# Patient Record
Sex: FEMALE | Race: WHITE | NOT HISPANIC OR LATINO | Employment: UNEMPLOYED | URBAN - METROPOLITAN AREA
[De-identification: names, ages, dates, MRNs, and addresses within clinical notes are randomized per-mention and may not be internally consistent; named-entity substitution may affect disease eponyms.]

---

## 2017-03-12 ENCOUNTER — HOSPITAL ENCOUNTER (EMERGENCY)
Facility: MEDICAL CENTER | Age: 27
End: 2017-03-12
Attending: EMERGENCY MEDICINE
Payer: COMMERCIAL

## 2017-03-12 VITALS
HEART RATE: 77 BPM | OXYGEN SATURATION: 99 % | BODY MASS INDEX: 23.44 KG/M2 | TEMPERATURE: 99 F | RESPIRATION RATE: 18 BRPM | HEIGHT: 63 IN | DIASTOLIC BLOOD PRESSURE: 62 MMHG | WEIGHT: 132.28 LBS | SYSTOLIC BLOOD PRESSURE: 113 MMHG

## 2017-03-12 DIAGNOSIS — K02.9 COMPLEX DENTAL CAVITY: ICD-10-CM

## 2017-03-12 PROCEDURE — 99283 EMERGENCY DEPT VISIT LOW MDM: CPT

## 2017-03-12 RX ORDER — AMOXICILLIN 500 MG/1
500 CAPSULE ORAL 3 TIMES DAILY
Qty: 30 CAP | Refills: 0 | Status: ON HOLD | OUTPATIENT
Start: 2017-03-12 | End: 2020-09-06

## 2017-03-12 RX ORDER — TRAMADOL HYDROCHLORIDE 50 MG/1
50-100 TABLET ORAL EVERY 6 HOURS PRN
Qty: 20 TAB | Refills: 0 | Status: ON HOLD | OUTPATIENT
Start: 2017-03-12 | End: 2020-09-06

## 2017-03-12 ASSESSMENT — PAIN SCALES - GENERAL: PAINLEVEL_OUTOF10: 6

## 2017-03-12 NOTE — ED AVS SNAPSHOT
3/12/2017          Sol Jones  [address] [place] [address]    Dear Sol:    [facility] wants to ensure your discharge home is safe and you or your loved ones have had all your questions answered regarding your care after you leave the hospital.    You may receive a telephone call within two days of your discharge.  This call is to make certain you understand your discharge instructions as well as ensure we provided you with the best care possible during your stay with us.     The call will only last approximately 3-5 minutes and will be done by a nurse.    Once again, we want to ensure your discharge home is safe and that you have a clear understanding of any next steps in your care.  If you have any questions or concerns, please do not hesitate to contact us, we are here for you.  Thank you for choosing [facility] for your healthcare needs.    Sincerely,    Charli Serrano    [facility]

## 2017-03-12 NOTE — DISCHARGE INSTRUCTIONS
Dental Caries  Dental caries is tooth decay. This decay can cause a hole in teeth (cavity) that can get bigger and deeper over time.  HOME CARE  · Brush and floss your teeth. Do this at least two times a day.  · Use a fluoride toothpaste.  · Use a mouth rinse if told by your dentist or doctor.  · Eat less sugary and starchy foods. Drink less sugary drinks.  · Avoid snacking often on sugary and starchy foods. Avoid sipping often on sugary drinks.  · Keep regular checkups and cleanings with your dentist.  · Use fluoride supplements if told by your dentist or doctor.  · Allow fluoride to be applied to teeth if told by your dentist or doctor.     This information is not intended to replace advice given to you by your health care provider. Make sure you discuss any questions you have with your health care provider.     Document Released: 09/26/2009 Document Revised: 01/08/2016 Document Reviewed: 12/20/2013  Else[employer]ang[employer]ango Interactive Patient Education ©2016 Elsevier Inc.

## 2017-03-12 NOTE — ED PROVIDER NOTES
"ED Provider Note    CHIEF COMPLAINT  Chief Complaint   Patient presents with   • Dental Pain       HPI  Sol Jones is a 26 y.o. female who presents for evaluation of dental pain. The patient is a complex large dental cavity in her left upper 2nd molar. It is been present for several months. She denies any recent trauma. Patient reports increasing pain. She has not been able to get into a dentist denies facial swelling high fevers neck stiffness. Denies pregnancy. No other complaints noted    REVIEW OF SYSTEMS  See HPI for further details. High fevers chills neck pain facial pain trouble breathing or swallowing All other systems are negative.     PAST MEDICAL HISTORY  Past Medical History   Diagnosis Date   • ASTHMA    • Ovarian cyst    • Psychiatric disorder        FAMILY HISTORY  No history of bleeding disorder    SOCIAL HISTORY  Social History     Social History   • Marital Status: Single     Spouse Name: N/A   • Number of Children: N/A   • Years of Education: N/A     Social History Main Topics   • Smoking status: Current Every Day Smoker -- 1.00 packs/day for 5 years     Types: Cigarettes   • Smokeless tobacco: Never Used   • Alcohol Use: Yes      Comment: Occasionally   • Drug Use: No   • Sexual Activity: Not Asked     Other Topics Concern   • None     Social History Narrative    denies IV drugs    SURGICAL HISTORY  Past Surgical History   Procedure Laterality Date   • Gyn surgery     • Other       L salpingectomy       CURRENT MEDICATIONS  Home Medications     **Home medications have not yet been reviewed for this encounter**        No regular medications  ALLERGIES  Allergies   Allergen Reactions   • No Known Drug Allergy        PHYSICAL EXAM  VITAL SIGNS: /62 mmHg  Pulse 77  Temp(Src) 37.2 °C (99 °F)  Resp 18  Ht 1.6 m (5' 3\")  Wt 60 kg (132 lb 4.4 oz)  BMI 23.44 kg/m2  SpO2 99%  LMP 02/26/2017      Constitutional: Well developed, Well nourished, No acute distress, Non-toxic " appearance.   HENT: Normocephalic, Atraumatic, Bilateral external ears normal, Oropharynx moist, No oral exudates, Nose normal. Large complex cavity in the left upper 2nd molar no surrounding gum abscess or facial swelling  Eyes: PERRLA, EOMI, Conjunctiva normal, No discharge.   Neck: Normal range of motion, No tenderness, Supple, No stridor.   Cardiovascular: Normal heart rate, Normal rhythm, No murmurs, No rubs, No gallops.   Thorax & Lungs: Normal breath sounds, No respiratory distress, No wheezing, No chest tenderness.   Abdomen: Bowel sounds normal, Soft, No tenderness, No masses, No pulsatile masses.   Skin: Warm, Dry, No erythema, No rash.   Extremities: Intact distal pulses, No edema, No tenderness, No cyanosis, No clubbing.   Neurologic: Alert & oriented x 3, Normal motor function, Normal sensory function, No focal deficits noted.       COURSE & MEDICAL DECISION MAKING  The patient here is no fever no tachycardia or no evidence of dental abscess facial infection. I will start on amoxicillin and tramadol. I recommended that she follow up with her dentist 1st thing this week    FINAL IMPRESSION  1.  1. Complex dental cavity               Electronically signed by: Jason Ivey, 3/12/2017 1:24 PM

## 2017-03-12 NOTE — ED AVS SNAPSHOT
Home Care Instructions                                                                                                                Sol Jones   MRN: [id number]    Department:  [facility], Emergency Dept   Date of Visit:  3/12/2017            [facility], Emergency Dept    [address]    [place] [address]    Phone:  [phone]      You were seen by     Jason Ivey M.D.      Your Diagnosis Was     Complex dental cavity     K02.9       Follow-up Information     1. Please follow up.    Why:  follow-up with your dentist in 1-2 days      Medication Information     Review all of your home medications and newly ordered medications with your primary doctor and/or pharmacist as soon as possible. Follow medication instructions as directed by your doctor and/or pharmacist.     Please keep your complete medication list with you and share with your physician. Update the information when medications are discontinued, doses are changed, or new medications (including over-the-counter products) are added; and carry medication information at all times in the event of emergency situations.               Medication List      START taking these medications        Instructions    Morning Afternoon Evening Bedtime    * amoxicillin 500 MG Caps   Commonly known as:  AMOXIL        Take 1 Cap by mouth 3 times a day.   Dose:  500 mg                        * amoxicillin 500 MG Caps   Commonly known as:  AMOXIL        Take 1 Cap by mouth 3 times a day.   Dose:  500 mg                        tramadol 50 MG Tabs   Commonly known as:  ULTRAM        Take 1-2 Tabs by mouth every 6 hours as needed for Moderate Pain (pain).   Dose:   mg                        * Notice:  This list has 2 medication(s) that are the same as other medications prescribed for you. Read the directions carefully, and ask your doctor or other care provider to review them with you.         Where to  Get Your Medications      These medications were sent to [id number] - [place], [place] - [address]  [address] [place] [address]     Phone:  [phone]    - amoxicillin 500 MG Caps      You can get these medications from any pharmacy     Bring a paper prescription for each of these medications    - amoxicillin 500 MG Caps  - tramadol 50 MG Tabs              Discharge Instructions       Dental Caries  Dental caries is tooth decay. This decay can cause a hole in teeth (cavity) that can get bigger and deeper over time.  HOME CARE  · Brush and floss your teeth. Do this at least two times a day.  · Use a fluoride toothpaste.  · Use a mouth rinse if told by your dentist or doctor.  · Eat less sugary and starchy foods. Drink less sugary drinks.  · Avoid snacking often on sugary and starchy foods. Avoid sipping often on sugary drinks.  · Keep regular checkups and cleanings with your dentist.  · Use fluoride supplements if told by your dentist or doctor.  · Allow fluoride to be applied to teeth if told by your dentist or doctor.     This information is not intended to replace advice given to you by your health care provider. Make sure you discuss any questions you have with your health care provider.     Document Released: 09/26/2009 Document Revised: 01/08/2016 Document Reviewed: 12/20/2013  Elsevier Interactive Patient Education ©2016 [employer]            Patient Information     Patient Information    Following emergency treatment: all patient requiring follow-up care must return either to a private physician or a clinic if your condition worsens before you are able to obtain further medical attention, please return to the emergency room.     Billing Information    At [facility], we work to make the billing process streamlined for our patients.  Our Representatives are here to answer any questions you may have regarding your hospital bill.  If you have insurance coverage and have supplied your  insurance information to us, we will submit a claim to your insurer on your behalf.  Should you have any questions regarding your bill, we can be reached online or by phone as follows:  Online: You are able pay your bills online or live chat with our representatives about any billing questions you may have. We are here to help Monday - Friday from 8:00am to 7:30pm and 9:00am - 12:00pm on Saturdays.  Please visit [email or web address]  for more information.   Phone:  [phone] or [phone]    Please note that your emergency physician, surgeon, pathologist, radiologist, anesthesiologist, and other specialists are not employed by [facility] and will therefore bill separately for their services.  Please contact them directly for any questions concerning their bills at the numbers below:     Emergency Physician Services:  [phone]  [facility]:  [phone]  [facility]:  [phone]  [facility]:  [phone]    1. Your final bill may vary from the amount quoted upon discharge if all procedures are not complete at that time, or if your doctor has additional procedures of which we are not aware. You will receive an additional bill if you return to the Emergency Department at [facility] for suture removal regardless of the facility of which the sutures were placed.     2. Please arrange for settlement of this account at the emergency registration.    3. All self-pay accounts are due in full at the time of treatment.  If you are unable to meet this obligation then payment is expected within 4-5 days.     4. If you have had radiology studies (CT, X-ray, Ultrasound, MRI), you have received a preliminary result during your emergency department visit. Please contact the radiology department [phone] to receive a copy of your final result. Please discuss the Final result with your primary physician or with the follow up  physician provided.     Crisis Hotline:  [place] Crisis Hotline:  4-198-URSLREY or 1-210.887.1782  [place] Crisis Hotline:    1-323.848.4047 or 075-440-9875         ED Discharge Follow Up Questions    1. In order to provide you with very good care, we would like to follow up with a phone call in the next few days.  May we have your permission to contact you?     YES /  NO    2. What is the best phone number to call you? (       )_____-__________    3. What is the best time to call you?      Morning  /  Afternoon  /  Evening                   Patient Signature:  ____________________________________________________________    Date:  ____________________________________________________________

## 2017-03-12 NOTE — ED NOTES
Pt given discharge instructions and prescriptions x 2, verbalized understanding to information provided including follow up care w/ Dentist, denied questions/concerns.  Pt ambulated from ER.

## 2017-03-12 NOTE — ED AVS SNAPSHOT
Clutter Access Code: [email or web address]  Expires: 3/20/2017 11:06 AM    Your email address is not on file at [facility].  Email Addresses are required for you to sign up for Clutter, please contact [phone] to verify your personal information and to provide your email address prior to attempting to register for Clutter.    Sol Jones  [address] [place] [address]    Clutter  A secure, online tool to manage your health information     [facility]’s Clutter® is a secure, online tool that connects you to your personalized health information from the privacy of your home -- day or night - making it very easy for you to manage your healthcare. Once the activation process is completed, you can even access your medical information using the Clutter catalina, which is available for free in the Apple Catalina store or Google Play store.     To learn more about Clutter, visit [email or web address]    There are two levels of access available (as shown below):   My Chart Features  [facility] Primary Care Doctor [facility]  Specialists [facility]  Urgent  Care Non-[facility] Primary Care Doctor   Email your healthcare team securely and privately 24/7 X X X    Manage appointments: schedule your next appointment; view details of past/upcoming appointments X      Request prescription refills. X      View recent personal medical records, including lab and immunizations X X X X   View health record, including health history, allergies, medications X X X X   Read reports about your outpatient visits, procedures, consult and ER notes X X X X   See your discharge summary, which is a recap of your hospital and/or ER visit that includes your diagnosis, lab results, and care plan X X  X     How to register for Clutter:  Once your e-mail address has been verified, follow the following steps to sign up for Clutter.     1. Go to  [email or web address]  2. Click on the Sign Up Now box, which takes you to the New Member Sign Up page. You  will need to provide the following information:  a. Enter your [employer] Access Code exactly as it appears at the top of this page. (You will not need to use this code after you’ve completed the sign-up process. If you do not sign up before the expiration date, you must request a new code.)   b. Enter your date of birth.   c. Enter your home email address.   d. Click Submit, and follow the next screen’s instructions.  3. Create a [employer]t ID. This will be your [employer] login ID and cannot be changed, so think of one that is secure and easy to remember.  4. Create a [employer] password. You can change your password at any time.  5. Enter your Password Reset Question and Answer. This can be used at a later time if you forget your password.   6. Enter your e-mail address. This allows you to receive e-mail notifications when new information is available in [employer].  7. Click Sign Up. You can now view your health information.    For assistance activating your [employer] account, call [phone]

## 2017-05-16 ENCOUNTER — HOSPITAL ENCOUNTER (EMERGENCY)
Facility: MEDICAL CENTER | Age: 27
End: 2017-05-16
Payer: COMMERCIAL

## 2017-05-16 ENCOUNTER — HOSPITAL ENCOUNTER (OUTPATIENT)
Dept: RADIOLOGY | Facility: MEDICAL CENTER | Age: 27
End: 2017-05-16
Attending: PHYSICIAN ASSISTANT
Payer: COMMERCIAL

## 2017-05-16 ENCOUNTER — OFFICE VISIT (OUTPATIENT)
Dept: URGENT CARE | Facility: CLINIC | Age: 27
End: 2017-05-16
Payer: COMMERCIAL

## 2017-05-16 VITALS
HEIGHT: 65 IN | WEIGHT: 126 LBS | SYSTOLIC BLOOD PRESSURE: 90 MMHG | DIASTOLIC BLOOD PRESSURE: 60 MMHG | HEART RATE: 78 BPM | OXYGEN SATURATION: 97 % | RESPIRATION RATE: 15 BRPM | BODY MASS INDEX: 20.99 KG/M2 | TEMPERATURE: 98.4 F

## 2017-05-16 VITALS
SYSTOLIC BLOOD PRESSURE: 103 MMHG | BODY MASS INDEX: 21.85 KG/M2 | OXYGEN SATURATION: 99 % | HEART RATE: 74 BPM | TEMPERATURE: 98.2 F | DIASTOLIC BLOOD PRESSURE: 56 MMHG | WEIGHT: 131.17 LBS | RESPIRATION RATE: 14 BRPM | HEIGHT: 65 IN

## 2017-05-16 DIAGNOSIS — R22.0 RIGHT FACIAL SWELLING: ICD-10-CM

## 2017-05-16 LAB
INT CON NEG: NORMAL
INT CON POS: NORMAL
POC URINE PREGNANCY TEST: NEGATIVE

## 2017-05-16 PROCEDURE — 700117 HCHG RX CONTRAST REV CODE 255: Performed by: PHYSICIAN ASSISTANT

## 2017-05-16 PROCEDURE — 81025 URINE PREGNANCY TEST: CPT | Performed by: PHYSICIAN ASSISTANT

## 2017-05-16 PROCEDURE — 302449 STATCHG TRIAGE ONLY (STATISTIC)

## 2017-05-16 PROCEDURE — 70487 CT MAXILLOFACIAL W/DYE: CPT

## 2017-05-16 PROCEDURE — 99214 OFFICE O/P EST MOD 30 MIN: CPT | Performed by: PHYSICIAN ASSISTANT

## 2017-05-16 RX ORDER — CEFTRIAXONE 1 G/1
1 INJECTION, POWDER, FOR SOLUTION INTRAMUSCULAR; INTRAVENOUS ONCE
Status: COMPLETED | OUTPATIENT
Start: 2017-05-16 | End: 2017-05-16

## 2017-05-16 RX ORDER — HYDROCODONE BITARTRATE AND ACETAMINOPHEN 5; 325 MG/1; MG/1
1 TABLET ORAL EVERY 6 HOURS PRN
Qty: 12 TAB | Refills: 0 | Status: ON HOLD | OUTPATIENT
Start: 2017-05-16 | End: 2020-09-06

## 2017-05-16 RX ORDER — CLINDAMYCIN HYDROCHLORIDE 300 MG/1
300 CAPSULE ORAL 4 TIMES DAILY
Qty: 40 CAP | Refills: 0 | Status: SHIPPED | OUTPATIENT
Start: 2017-05-16 | End: 2017-05-26

## 2017-05-16 RX ADMIN — CEFTRIAXONE 1 G: 1 INJECTION, POWDER, FOR SOLUTION INTRAMUSCULAR; INTRAVENOUS at 19:10

## 2017-05-16 RX ADMIN — IOHEXOL 100 ML: 350 INJECTION, SOLUTION INTRAVENOUS at 17:26

## 2017-05-16 ASSESSMENT — ENCOUNTER SYMPTOMS
ABDOMINAL PAIN: 0
TINGLING: 0
FOCAL WEAKNESS: 0
FEVER: 0
HEADACHES: 0
SENSORY CHANGE: 0
CHILLS: 0
VOMITING: 0
NAUSEA: 0

## 2017-05-16 ASSESSMENT — PAIN SCALES - GENERAL: PAINLEVEL_OUTOF10: 8

## 2017-05-16 NOTE — PROGRESS NOTES
"Subjective:      Sol Jones is a 26 y.o. female who presents with Cyst            HPI  The patient presents to the clinic today with right facial swelling that started about 1 week ago.   She has noticed worsening over the past week. She has history of poor dentition.  She has not seen a dentist in quite some time. She denies fever or chills. She denies nausea or vomiting.  She has no facial redness or warmth. She states she does have some tooth pain on the right lower jaw.     Past Medical History   Diagnosis Date   • ASTHMA    • Ovarian cyst    • Psychiatric disorder        Past Surgical History   Procedure Laterality Date   • Gyn surgery     • Other       L salpingectomy       No family history on file.    Allergies   Allergen Reactions   • Amoxicillin Unspecified     Gi problem   • No Known Drug Allergy        Medications, Allergies, and current problem list reviewed today in Epic    Review of Systems   Constitutional: Negative for fever, chills and malaise/fatigue.   HENT: Negative for ear discharge and ear pain.         Facial swelling on the right side. The patient has no lymph node swelling    Gastrointestinal: Negative for nausea, vomiting and abdominal pain.   Skin: Negative for rash.   Neurological: Negative for tingling, sensory change, focal weakness and headaches.     All other systems reviewed and are negative.        Objective:     BP 90/60 mmHg  Pulse 78  Temp(Src) 36.9 °C (98.4 °F)  Resp 15  Ht 1.651 m (5' 5\")  Wt 57.153 kg (126 lb)  BMI 20.97 kg/m2  SpO2 97%  LMP 04/15/2017  Breastfeeding? No     Physical Exam   Constitutional: She is oriented to person, place, and time. She appears well-developed and well-nourished. No distress.   HENT:   Head: Normocephalic and atraumatic.       Eyes: Conjunctivae are normal.   Neck: Neck supple.   Pulmonary/Chest: Effort normal. No respiratory distress.   Lymphadenopathy:     She has no cervical adenopathy.   Neurological: She is alert and " oriented to person, place, and time. No cranial nerve deficit.   Psychiatric: She has a normal mood and affect. Her behavior is normal. Judgment and thought content normal.           5/16/2017 5:04 PM    HISTORY/REASON FOR EXAM:  Facial Pain/jaw abscess.      TECHNIQUE/EXAM DESCRIPTION AND NUMBER OF VIEWS:  Maxillofacial CT with contrast.    Axial acquisition with coronal and sagittal reformations. Field-of-view is the facial bones.    100 mL of Omnipaque 350 nonionic contrast was injected intravenously.    Low dose optimization technique was utilized for this CT exam including automated exposure control and adjustment of the mA and/or kV according to patient size.    COMPARISON:  None.    FINDINGS:  There is no abscess overlying the mandible. Submandibular and submental spaces appear normal. The buccal spaces appear normal. Bilateral parotid and parapharyngeal spaces appear normal. Bilateral submandibular glands appear normal.    The mandible appear normal. There is no ossicular erosion. The maxillary bones appear normal.         Impression        There is no evidence of abscess in the visualized maxillary facial soft tissue..          Assessment/Plan:     1. Right facial swelling    Discussed treatment options and imaging studies. I do not have access to CT at this site.  DDX include sialadenitis vs abscess. Explained that we can get outpatient imaging but it may not be today and she will have to go off-site.  Offered to do a Rocephin shot and oral antibiotics to cover for infection. The patient would like to go to an ER to get  Imaging studies on-site  and answers today.    Irene Cabrera PA-C     ADDENDUM- I called patient after she left and notified her that I was able to schedule a CT today. She would like to proceed with outpatient work-up.  The CT was negative for abscess or  Salivary gland abnormality.  At this time, I feel her facial swelling could be related to a dental abscess. The patient returned to  the clinic after the CT.   The patient was given Rocephin 1 gm IM today.     Current outpatient prescriptions:   •  clindamycin (CLEOCIN) 300 MG Cap, Take 1 Cap by mouth 4 times a day for 10 days., Disp: 40 Cap, Rfl: 0  •  hydrocodone-acetaminophen (NORCO) 5-325 MG Tab per tablet, Take 1 Tab by mouth every 6 hours as needed., Disp: 12 Tab, Rfl: 0  Sedation side effects discussed. No Driving or alcohol with medication given.  [facility] Aware web site evaluation: I have obtained and reviewed patient utilization report from [facility] pharmacy database prior to writing prescription for controlled substance II, III or IV per [place] bill . Based on the report and my clinical assessment the prescription is medically necessary.    Encouraged follow-up with a dentist ASAP.     Differential diagnoses, Supportive care, and indications for immediate follow-up discussed with patient.   Instructed to return to clinic or nearest emergency department for any change in condition, further concerns, or worsening of symptoms.    The patient demonstrated a good understanding and agreed with the treatment plan.    Irene Cabrera PA-C

## 2017-05-16 NOTE — MR AVS SNAPSHOT
"        Sol Jones   2017 1:15 PM   Office Visit   MRN: [id number]    Department:  [facility]   Dept Phone:  [phone]    Description:  Female : 1990   Provider:  Irene Cabrera PA-C           Reason for Visit     Cyst located on right side of face, close to jaw line, x1wk, hurts to apply pressure or move jaw      Allergies as of 2017     Allergen Noted Reactions    Amoxicillin 2017   Unspecified    Gi problem      You were diagnosed with     Right facial swelling   [300760]         Vital Signs     Blood Pressure Pulse Temperature Respirations Height Weight    90/60 mmHg 78 36.9 °C (98.4 °F) 15 1.651 m (5' 5\") 57.153 kg (126 lb)    Body Mass Index Oxygen Saturation Last Menstrual Period Breastfeeding? Smoking Status       20.97 kg/m2 97% 04/15/2017 No Current Every Day Smoker       Basic Information     Date Of Birth Sex Race Ethnicity Preferred Language    1990 Female White Non- English      Health Maintenance        Date Due Completion Dates    IMM HEP B VACCINE (1 of 3 - Primary Series) 1991 ---    IMM HEP A VACCINE (1 of 2 - Standard Series) 1992 ---    IMM HPV VACCINE (1 of 3 - Female 3 Dose Series) 2002 ---    IMM VARICELLA (CHICKENPOX) VACCINE (1 of 2 - 2 Dose Adolescent Series) 2004 ---    IMM DTaP/Tdap/Td Vaccine (1 - Tdap) 2010 ---    PAP SMEAR 2012 ---            Current Immunizations     No immunizations on file.      Below and/or attached are the medications your provider expects you to take. Review all of your home medications and newly ordered medications with your provider and/or pharmacist. Follow medication instructions as directed by your provider and/or pharmacist. Please keep your medication list with you and share with your provider. Update the information when medications are discontinued, doses are changed, or new medications (including over-the-counter products) are added; and carry medication information at " all times in the event of emergency situations     Allergies:  AMOXICILLIN - Unspecified               Medications  Valid as of: May 16, 2017 -  2:10 PM    Generic Name Brand Name Tablet Size Instructions for use    Amoxicillin (Cap) AMOXIL 500 MG Take 1 Cap by mouth 3 times a day.        Amoxicillin (Cap) AMOXIL 500 MG Take 1 Cap by mouth 3 times a day.        TraMADol HCl (Tab) ULTRAM 50 MG Take 1-2 Tabs by mouth every 6 hours as needed for Moderate Pain (pain).        .                 Medicines prescribed today were sent to:     [facility][id number] - [place], [place] - [address]    [address] [place] [address]    Phone: [phone] Fax: [phone]    Open 24 Hours?: No      Medication refill instructions:       If your prescription bottle indicates you have medication refills left, it is not necessary to call your provider’s office. Please contact your pharmacy and they will refill your medication.    If your prescription bottle indicates you do not have any refills left, you may request refills at any time through one of the following ways: The online Choister system (except Urgent Care), by calling your provider’s office, or by asking your pharmacy to contact your provider’s office with a refill request. Medication refills are processed only during regular business hours and may not be available until the next business day. Your provider may request additional information or to have a follow-up visit with you prior to refilling your medication.   *Please Note: Medication refills are assigned a new Rx number when refilled electronically. Your pharmacy may indicate that no refills were authorized even though a new prescription for the same medication is available at the pharmacy. Please request the medicine by name with the pharmacy before contacting your provider for a refill.           Choister Access Code: [email or web address]  Expires: 6/15/2017 12:50 PM    Choister  A secure, online tool to manage  your health information     [employer]’s Routeware® is a secure, online tool that connects you to your personalized health information from the privacy of your home -- day or night - making it very easy for you to manage your healthcare. Once the activation process is completed, you can even access your medical information using the Routeware catalina, which is available for free in the Apple Catalina store or Google Play store.     Routeware provides the following levels of access (as shown below):   My Chart Features   [facility] Doctor [facility]  Urgent  Care [facility]  Primary Care  Doctor   Email your healthcare team securely and privately 24/7 X X X    Manage appointments: schedule your next appointment; view details of past/upcoming appointments X      Request prescription refills. X      View recent personal medical records, including lab and immunizations X X X X   View health record, including health history, allergies, medications X X X X   Read reports about your outpatient visits, procedures, consult and ER notes X X X X   See your discharge summary, which is a recap of your hospital and/or ER visit that includes your diagnosis, lab results, and care plan. X X       How to register for Routeware:  1. Go to  [email or web address].  2. Click on the Sign Up Now box, which takes you to the New Member Sign Up page. You will need to provide the following information:  a. Enter your Routeware Access Code exactly as it appears at the top of this page. (You will not need to use this code after you’ve completed the sign-up process. If you do not sign up before the expiration date, you must request a new code.)   b. Enter your date of birth.   c. Enter your home email address.   d. Click Submit, and follow the next screen’s instructions.  3. Create a Crashly[employer]st ID. This will be your Routeware login ID and cannot be changed, so think of one that is secure and easy to remember.  4. Create a Crashlyticst  password. You can change your password at any time.  5. Enter your Password Reset Question and Answer. This can be used at a later time if you forget your password.   6. Enter your e-mail address. This allows you to receive e-mail notifications when new information is available in [employer].  7. Click Sign Up. You can now view your health information.    For assistance activating your [employer] account, call [phone]        Quit Tobacco Information     Do you want to quit using tobacco?    Quitting tobacco decreases risks of cancer, heart and lung disease, increases life expectancy, improves sense of taste and smell, and increases spending money, among other benefits.    If you are thinking about quitting, we can help.  • [facility] Quit Tobacco Program: 305.401.3449  o Program occurs weekly for four weeks and includes pharmacist consultation on products to support quitting smoking or chewing tobacco. A provider referral is needed for pharmacist consultation.  • Tobacco Users Help Hotline: 4-800-QUIT-NOW (511-5381) or https://[place].quitlogix.org/  o Free, confidential telephone and online coaching for [place] residents. Sessions are designed on a schedule that is convenient for you. Eligible clients receive free nicotine replacement therapy.  • Nationally: www.smokefree.gov  o Information and professional assistance to support both immediate and long-term needs as you become, and remain, a non-smoker. Smokefree.gov allows you to choose the help that best fits your needs.

## 2017-05-21 ENCOUNTER — TELEPHONE (OUTPATIENT)
Dept: URGENT CARE | Facility: CLINIC | Age: 27
End: 2017-05-21

## 2017-05-21 NOTE — TELEPHONE ENCOUNTER
Pt called today. Needs to speak with you about getting days off due to pain medication  Her cell is [phone]

## 2018-07-16 ENCOUNTER — PATIENT OUTREACH (OUTPATIENT)
Dept: HEALTH INFORMATION MANAGEMENT | Facility: OTHER | Age: 28
End: 2018-07-16

## 2018-07-16 NOTE — PROGRESS NOTES
Outcome: Left Message    Please transfer to Patient Outreach Team at [phone] when patient returns call.    WebIZ Checked & Epic Updated:  yes    Attempt # 3    PRIOR ATTEMPTS MADE BY [employer]

## 2018-07-18 NOTE — PROGRESS NOTES
Outcome: Left Message    Please transfer to Patient Outreach Team at [phone] when patient returns call.    Attempt # 4/ FINAL          Previous attempts made by [employer]

## 2020-06-11 ENCOUNTER — NON-PROVIDER VISIT (OUTPATIENT)
Dept: URGENT CARE | Facility: CLINIC | Age: 30
End: 2020-06-11

## 2020-06-11 DIAGNOSIS — Z02.1 PRE-EMPLOYMENT DRUG SCREENING: ICD-10-CM

## 2020-06-11 LAB
AMP AMPHETAMINE: NORMAL
BAR BARBITURATES: NORMAL
BZO BENZODIAZEPINES: NORMAL
COC COCAINE: NORMAL
INT CON NEG: NORMAL
INT CON POS: NORMAL
MDMA ECSTASY: NORMAL
MET METHAMPHETAMINES: NORMAL
MTD METHADONE: NORMAL
OPI OPIATES: NORMAL
OXY OXYCODONE: NORMAL
PCP PHENCYCLIDINE: NORMAL
POC URINE DRUG SCREEN OCDRS: NEGATIVE
THC: NORMAL

## 2020-06-11 PROCEDURE — 80305 DRUG TEST PRSMV DIR OPT OBS: CPT | Performed by: PHYSICIAN ASSISTANT

## 2020-09-06 ENCOUNTER — APPOINTMENT (OUTPATIENT)
Dept: RADIOLOGY | Facility: MEDICAL CENTER | Age: 30
End: 2020-09-06
Attending: EMERGENCY MEDICINE
Payer: COMMERCIAL

## 2020-09-06 ENCOUNTER — HOSPITAL ENCOUNTER (OUTPATIENT)
Facility: MEDICAL CENTER | Age: 30
End: 2020-09-07
Attending: EMERGENCY MEDICINE | Admitting: HOSPITALIST
Payer: COMMERCIAL

## 2020-09-06 DIAGNOSIS — A41.9 SEPSIS WITHOUT ACUTE ORGAN DYSFUNCTION, DUE TO UNSPECIFIED ORGANISM (HCC): ICD-10-CM

## 2020-09-06 DIAGNOSIS — K11.20 SIALADENITIS: Primary | ICD-10-CM

## 2020-09-06 DIAGNOSIS — K11.20 SIALOADENITIS: ICD-10-CM

## 2020-09-06 PROBLEM — F41.9 ANXIETY: Status: ACTIVE | Noted: 2020-09-06

## 2020-09-06 PROBLEM — J45.909 ASTHMA: Status: ACTIVE | Noted: 2020-09-06

## 2020-09-06 PROBLEM — Z72.0 TOBACCO USE: Status: ACTIVE | Noted: 2020-09-06

## 2020-09-06 LAB
ALBUMIN SERPL BCP-MCNC: 4.4 G/DL (ref 3.2–4.9)
ALBUMIN/GLOB SERPL: 1.2 G/DL
ALP SERPL-CCNC: 129 U/L (ref 30–99)
ALT SERPL-CCNC: 23 U/L (ref 2–50)
ANION GAP SERPL CALC-SCNC: 15 MMOL/L (ref 7–16)
AST SERPL-CCNC: 36 U/L (ref 12–45)
BASOPHILS # BLD AUTO: 0.4 % (ref 0–1.8)
BASOPHILS # BLD: 0.06 K/UL (ref 0–0.12)
BILIRUB SERPL-MCNC: 1 MG/DL (ref 0.1–1.5)
BUN SERPL-MCNC: 8 MG/DL (ref 8–22)
CALCIUM SERPL-MCNC: 9.3 MG/DL (ref 8.4–10.2)
CHLORIDE SERPL-SCNC: 98 MMOL/L (ref 96–112)
CO2 SERPL-SCNC: 24 MMOL/L (ref 20–33)
COVID ORDER STATUS COVID19: NORMAL
CREAT SERPL-MCNC: 0.65 MG/DL (ref 0.5–1.4)
EOSINOPHIL # BLD AUTO: 0.12 K/UL (ref 0–0.51)
EOSINOPHIL NFR BLD: 0.8 % (ref 0–6.9)
ERYTHROCYTE [DISTWIDTH] IN BLOOD BY AUTOMATED COUNT: 39.3 FL (ref 35.9–50)
GLOBULIN SER CALC-MCNC: 3.6 G/DL (ref 1.9–3.5)
GLUCOSE SERPL-MCNC: 93 MG/DL (ref 65–99)
HCG SERPL QL: NEGATIVE
HCT VFR BLD AUTO: 42.1 % (ref 37–47)
HGB BLD-MCNC: 14.7 G/DL (ref 12–16)
IMM GRANULOCYTES # BLD AUTO: 0.07 K/UL (ref 0–0.11)
IMM GRANULOCYTES NFR BLD AUTO: 0.5 % (ref 0–0.9)
LACTATE BLD-SCNC: 1.3 MMOL/L (ref 0.5–2)
LYMPHOCYTES # BLD AUTO: 2.09 K/UL (ref 1–4.8)
LYMPHOCYTES NFR BLD: 14.5 % (ref 22–41)
MCH RBC QN AUTO: 30.9 PG (ref 27–33)
MCHC RBC AUTO-ENTMCNC: 34.9 G/DL (ref 33.6–35)
MCV RBC AUTO: 88.4 FL (ref 81.4–97.8)
MONOCYTES # BLD AUTO: 1.56 K/UL (ref 0–0.85)
MONOCYTES NFR BLD AUTO: 10.8 % (ref 0–13.4)
NEUTROPHILS # BLD AUTO: 10.48 K/UL (ref 2–7.15)
NEUTROPHILS NFR BLD: 73 % (ref 44–72)
NRBC # BLD AUTO: 0 K/UL
NRBC BLD-RTO: 0 /100 WBC
PLATELET # BLD AUTO: 326 K/UL (ref 164–446)
PMV BLD AUTO: 8.6 FL (ref 9–12.9)
POTASSIUM SERPL-SCNC: 3.9 MMOL/L (ref 3.6–5.5)
PROT SERPL-MCNC: 8 G/DL (ref 6–8.2)
RBC # BLD AUTO: 4.76 M/UL (ref 4.2–5.4)
SODIUM SERPL-SCNC: 137 MMOL/L (ref 135–145)
WBC # BLD AUTO: 14.4 K/UL (ref 4.8–10.8)

## 2020-09-06 PROCEDURE — 96375 TX/PRO/DX INJ NEW DRUG ADDON: CPT

## 2020-09-06 PROCEDURE — G0378 HOSPITAL OBSERVATION PER HR: HCPCS

## 2020-09-06 PROCEDURE — 70491 CT SOFT TISSUE NECK W/DYE: CPT

## 2020-09-06 PROCEDURE — 99220 PR INITIAL OBSERVATION CARE,LEVL III: CPT | Mod: 25 | Performed by: HOSPITALIST

## 2020-09-06 PROCEDURE — 700111 HCHG RX REV CODE 636 W/ 250 OVERRIDE (IP): Performed by: EMERGENCY MEDICINE

## 2020-09-06 PROCEDURE — 80053 COMPREHEN METABOLIC PANEL: CPT

## 2020-09-06 PROCEDURE — 99406 BEHAV CHNG SMOKING 3-10 MIN: CPT | Performed by: HOSPITALIST

## 2020-09-06 PROCEDURE — 96367 TX/PROPH/DG ADDL SEQ IV INF: CPT

## 2020-09-06 PROCEDURE — 84703 CHORIONIC GONADOTROPIN ASSAY: CPT

## 2020-09-06 PROCEDURE — 99285 EMERGENCY DEPT VISIT HI MDM: CPT

## 2020-09-06 PROCEDURE — 700102 HCHG RX REV CODE 250 W/ 637 OVERRIDE(OP): Performed by: HOSPITALIST

## 2020-09-06 PROCEDURE — A9270 NON-COVERED ITEM OR SERVICE: HCPCS | Performed by: HOSPITALIST

## 2020-09-06 PROCEDURE — 700105 HCHG RX REV CODE 258: Performed by: EMERGENCY MEDICINE

## 2020-09-06 PROCEDURE — U0003 INFECTIOUS AGENT DETECTION BY NUCLEIC ACID (DNA OR RNA); SEVERE ACUTE RESPIRATORY SYNDROME CORONAVIRUS 2 (SARS-COV-2) (CORONAVIRUS DISEASE [COVID-19]), AMPLIFIED PROBE TECHNIQUE, MAKING USE OF HIGH THROUGHPUT TECHNOLOGIES AS DESCRIBED BY CMS-2020-01-R: HCPCS

## 2020-09-06 PROCEDURE — 700101 HCHG RX REV CODE 250: Performed by: HOSPITALIST

## 2020-09-06 PROCEDURE — 36415 COLL VENOUS BLD VENIPUNCTURE: CPT

## 2020-09-06 PROCEDURE — 700105 HCHG RX REV CODE 258: Performed by: HOSPITALIST

## 2020-09-06 PROCEDURE — C9803 HOPD COVID-19 SPEC COLLECT: HCPCS | Performed by: HOSPITALIST

## 2020-09-06 PROCEDURE — 87040 BLOOD CULTURE FOR BACTERIA: CPT

## 2020-09-06 PROCEDURE — 96365 THER/PROPH/DIAG IV INF INIT: CPT

## 2020-09-06 PROCEDURE — 700117 HCHG RX CONTRAST REV CODE 255: Performed by: EMERGENCY MEDICINE

## 2020-09-06 PROCEDURE — 85025 COMPLETE CBC W/AUTO DIFF WBC: CPT

## 2020-09-06 PROCEDURE — 83605 ASSAY OF LACTIC ACID: CPT

## 2020-09-06 PROCEDURE — 94760 N-INVAS EAR/PLS OXIMETRY 1: CPT

## 2020-09-06 PROCEDURE — 700101 HCHG RX REV CODE 250: Performed by: EMERGENCY MEDICINE

## 2020-09-06 RX ORDER — ONDANSETRON 4 MG/1
4 TABLET, ORALLY DISINTEGRATING ORAL EVERY 4 HOURS PRN
Status: DISCONTINUED | OUTPATIENT
Start: 2020-09-06 | End: 2020-09-07 | Stop reason: HOSPADM

## 2020-09-06 RX ORDER — LORAZEPAM 2 MG/ML
1 INJECTION INTRAMUSCULAR ONCE
Status: COMPLETED | OUTPATIENT
Start: 2020-09-06 | End: 2020-09-06

## 2020-09-06 RX ORDER — TRAZODONE HYDROCHLORIDE 50 MG/1
25 TABLET ORAL 2 TIMES DAILY PRN
Status: DISCONTINUED | OUTPATIENT
Start: 2020-09-06 | End: 2020-09-07 | Stop reason: HOSPADM

## 2020-09-06 RX ORDER — ALBUTEROL SULFATE 90 UG/1
2 AEROSOL, METERED RESPIRATORY (INHALATION)
Status: DISCONTINUED | OUTPATIENT
Start: 2020-09-06 | End: 2020-09-06

## 2020-09-06 RX ORDER — PROMETHAZINE HYDROCHLORIDE 25 MG/1
12.5-25 TABLET ORAL EVERY 4 HOURS PRN
Status: DISCONTINUED | OUTPATIENT
Start: 2020-09-06 | End: 2020-09-07 | Stop reason: HOSPADM

## 2020-09-06 RX ORDER — OXYCODONE HYDROCHLORIDE 5 MG/1
5 TABLET ORAL
Status: DISCONTINUED | OUTPATIENT
Start: 2020-09-06 | End: 2020-09-07 | Stop reason: HOSPADM

## 2020-09-06 RX ORDER — HYDROMORPHONE HYDROCHLORIDE 1 MG/ML
0.25 INJECTION, SOLUTION INTRAMUSCULAR; INTRAVENOUS; SUBCUTANEOUS
Status: DISCONTINUED | OUTPATIENT
Start: 2020-09-06 | End: 2020-09-07 | Stop reason: HOSPADM

## 2020-09-06 RX ORDER — PROCHLORPERAZINE EDISYLATE 5 MG/ML
5-10 INJECTION INTRAMUSCULAR; INTRAVENOUS EVERY 4 HOURS PRN
Status: DISCONTINUED | OUTPATIENT
Start: 2020-09-06 | End: 2020-09-07 | Stop reason: HOSPADM

## 2020-09-06 RX ORDER — ONDANSETRON 2 MG/ML
4 INJECTION INTRAMUSCULAR; INTRAVENOUS EVERY 4 HOURS PRN
Status: DISCONTINUED | OUTPATIENT
Start: 2020-09-06 | End: 2020-09-07 | Stop reason: HOSPADM

## 2020-09-06 RX ORDER — OXYCODONE HYDROCHLORIDE 5 MG/1
2.5 TABLET ORAL
Status: DISCONTINUED | OUTPATIENT
Start: 2020-09-06 | End: 2020-09-07 | Stop reason: HOSPADM

## 2020-09-06 RX ORDER — SODIUM CHLORIDE 9 MG/ML
1000 INJECTION, SOLUTION INTRAVENOUS ONCE
Status: COMPLETED | OUTPATIENT
Start: 2020-09-06 | End: 2020-09-06

## 2020-09-06 RX ORDER — NICOTINE 21 MG/24HR
14 PATCH, TRANSDERMAL 24 HOURS TRANSDERMAL
Status: DISCONTINUED | OUTPATIENT
Start: 2020-09-07 | End: 2020-09-07 | Stop reason: HOSPADM

## 2020-09-06 RX ORDER — CLINDAMYCIN PHOSPHATE 600 MG/50ML
600 INJECTION, SOLUTION INTRAVENOUS EVERY 8 HOURS
Status: DISCONTINUED | OUTPATIENT
Start: 2020-09-06 | End: 2020-09-07 | Stop reason: HOSPADM

## 2020-09-06 RX ORDER — SODIUM CHLORIDE 9 MG/ML
INJECTION, SOLUTION INTRAVENOUS CONTINUOUS
Status: DISCONTINUED | OUTPATIENT
Start: 2020-09-06 | End: 2020-09-07

## 2020-09-06 RX ORDER — ALBUTEROL SULFATE 90 UG/1
2 AEROSOL, METERED RESPIRATORY (INHALATION) EVERY 4 HOURS PRN
Status: DISCONTINUED | OUTPATIENT
Start: 2020-09-06 | End: 2020-09-07 | Stop reason: HOSPADM

## 2020-09-06 RX ORDER — BISACODYL 10 MG
10 SUPPOSITORY, RECTAL RECTAL
Status: DISCONTINUED | OUTPATIENT
Start: 2020-09-06 | End: 2020-09-07 | Stop reason: HOSPADM

## 2020-09-06 RX ORDER — ACETAMINOPHEN 325 MG/1
650 TABLET ORAL EVERY 6 HOURS PRN
Status: DISCONTINUED | OUTPATIENT
Start: 2020-09-06 | End: 2020-09-07 | Stop reason: HOSPADM

## 2020-09-06 RX ORDER — PROMETHAZINE HYDROCHLORIDE 25 MG/1
12.5-25 SUPPOSITORY RECTAL EVERY 4 HOURS PRN
Status: DISCONTINUED | OUTPATIENT
Start: 2020-09-06 | End: 2020-09-07 | Stop reason: HOSPADM

## 2020-09-06 RX ORDER — CLINDAMYCIN PHOSPHATE 900 MG/50ML
900 INJECTION, SOLUTION INTRAVENOUS ONCE
Status: COMPLETED | OUTPATIENT
Start: 2020-09-06 | End: 2020-09-06

## 2020-09-06 RX ORDER — POLYETHYLENE GLYCOL 3350 17 G/17G
1 POWDER, FOR SOLUTION ORAL
Status: DISCONTINUED | OUTPATIENT
Start: 2020-09-06 | End: 2020-09-07 | Stop reason: HOSPADM

## 2020-09-06 RX ORDER — AMOXICILLIN 250 MG
2 CAPSULE ORAL 2 TIMES DAILY
Status: DISCONTINUED | OUTPATIENT
Start: 2020-09-06 | End: 2020-09-07 | Stop reason: HOSPADM

## 2020-09-06 RX ORDER — HYDROMORPHONE HYDROCHLORIDE 1 MG/ML
1 INJECTION, SOLUTION INTRAMUSCULAR; INTRAVENOUS; SUBCUTANEOUS ONCE
Status: COMPLETED | OUTPATIENT
Start: 2020-09-06 | End: 2020-09-06

## 2020-09-06 RX ADMIN — HYDROMORPHONE HYDROCHLORIDE 1 MG: 1 INJECTION, SOLUTION INTRAMUSCULAR; INTRAVENOUS; SUBCUTANEOUS at 17:42

## 2020-09-06 RX ADMIN — IOHEXOL 80 ML: 350 INJECTION, SOLUTION INTRAVENOUS at 18:28

## 2020-09-06 RX ADMIN — LORAZEPAM 1 MG: 2 INJECTION INTRAMUSCULAR; INTRAVENOUS at 19:39

## 2020-09-06 RX ADMIN — CLINDAMYCIN IN 5 PERCENT DEXTROSE 900 MG: 18 INJECTION, SOLUTION INTRAVENOUS at 17:42

## 2020-09-06 RX ADMIN — SODIUM CHLORIDE: 9 INJECTION, SOLUTION INTRAVENOUS at 20:47

## 2020-09-06 RX ADMIN — SENNOSIDES-DOCUSATE SODIUM TAB 8.6-50 MG 2 TABLET: 8.6-5 TAB at 20:40

## 2020-09-06 RX ADMIN — SODIUM CHLORIDE 1000 ML: 9 INJECTION, SOLUTION INTRAVENOUS at 17:42

## 2020-09-06 RX ADMIN — CLINDAMYCIN IN 5 PERCENT DEXTROSE 600 MG: 12 INJECTION, SOLUTION INTRAVENOUS at 21:43

## 2020-09-06 RX ADMIN — TRAZODONE HYDROCHLORIDE 25 MG: 50 TABLET ORAL at 21:42

## 2020-09-06 SDOH — HEALTH STABILITY: MENTAL HEALTH: HOW OFTEN DO YOU HAVE A DRINK CONTAINING ALCOHOL?: MONTHLY OR LESS

## 2020-09-06 ASSESSMENT — ENCOUNTER SYMPTOMS
VOMITING: 0
BACK PAIN: 0
DIZZINESS: 0
EYE REDNESS: 0
SEIZURES: 0
HALLUCINATIONS: 0
FEVER: 0
HEADACHES: 0
NERVOUS/ANXIOUS: 0
MYALGIAS: 0
SPEECH CHANGE: 0
SHORTNESS OF BREATH: 0
COUGH: 0
SORE THROAT: 0
FLANK PAIN: 0
NECK PAIN: 0
CHILLS: 1
EYE PAIN: 0
CHILLS: 0
PALPITATIONS: 0
DIARRHEA: 0
ABDOMINAL PAIN: 0
EYE DISCHARGE: 0
BLOOD IN STOOL: 0
LOSS OF CONSCIOUSNESS: 0
FOCAL WEAKNESS: 0
STRIDOR: 0
SPUTUM PRODUCTION: 0
HEMOPTYSIS: 0
BRUISES/BLEEDS EASILY: 0
NAUSEA: 0

## 2020-09-06 ASSESSMENT — LIFESTYLE VARIABLES
AVERAGE NUMBER OF DAYS PER WEEK YOU HAVE A DRINK CONTAINING ALCOHOL: 1
TOTAL SCORE: 0
HAVE YOU EVER FELT YOU SHOULD CUT DOWN ON YOUR DRINKING: NO
ON A TYPICAL DAY WHEN YOU DRINK ALCOHOL HOW MANY DRINKS DO YOU HAVE: 1
TOTAL SCORE: 0
ALCOHOL_USE: YES
HOW MANY TIMES IN THE PAST YEAR HAVE YOU HAD 5 OR MORE DRINKS IN A DAY: 0
HAVE PEOPLE ANNOYED YOU BY CRITICIZING YOUR DRINKING: NO
SUBSTANCE_ABUSE: 0
EVER FELT BAD OR GUILTY ABOUT YOUR DRINKING: NO
CONSUMPTION TOTAL: NEGATIVE
TOTAL SCORE: 0
EVER HAD A DRINK FIRST THING IN THE MORNING TO STEADY YOUR NERVES TO GET RID OF A HANGOVER: NO

## 2020-09-06 ASSESSMENT — COGNITIVE AND FUNCTIONAL STATUS - GENERAL
SUGGESTED CMS G CODE MODIFIER DAILY ACTIVITY: CH
SUGGESTED CMS G CODE MODIFIER MOBILITY: CH
MOBILITY SCORE: 24
DAILY ACTIVITIY SCORE: 24

## 2020-09-06 ASSESSMENT — PATIENT HEALTH QUESTIONNAIRE - PHQ9
1. LITTLE INTEREST OR PLEASURE IN DOING THINGS: NOT AT ALL
2. FEELING DOWN, DEPRESSED, IRRITABLE, OR HOPELESS: NOT AT ALL
SUM OF ALL RESPONSES TO PHQ9 QUESTIONS 1 AND 2: 0

## 2020-09-06 NOTE — ED PROVIDER NOTES
"ED Provider Note   9/6/2020  4:56 PM    Means of Arrival: Walk In  History obtained by: patient  Limitations: none    CHIEF COMPLAINT  Chief Complaint   Patient presents with   • Dental Pain     RT lower jaw x 2 d No fever \" I have a broken tooth \" x 6 mons   • Facial Swelling       HPI  Sol Jones is a 30 y.o. female with concerns of right-sided facial pain.  She says she has a history of broken tooth at her right lower jaw for the past 6 months.  Earlier in the week she started to have pain at the right side of her face.  The pain was more at her cheek.  Over the past 24 hours she has had a rapid swelling of the right side of her face.  Painful to open her mouth.  No problems swallowing.  No problems breathing.  She says the pain is constant throbbing, sharp when movements of her jaw or touching her face.    REVIEW OF SYSTEMS  Review of Systems   Constitutional: Negative for chills and fever.   HENT: Negative for sore throat.    Respiratory: Negative for cough, shortness of breath and stridor.    Cardiovascular: Negative for chest pain.   Gastrointestinal: Negative for abdominal pain, nausea and vomiting.   Musculoskeletal: Negative for back pain and neck pain.   Neurological: Negative for dizziness and headaches.   Psychiatric/Behavioral: Negative for substance abuse.   All other systems reviewed and are negative.    See HPI for further details.     PAST MEDICAL HISTORY   has a past medical history of ASTHMA, Ovarian cyst, and Psychiatric disorder.    SOCIAL HISTORY  Social History     Tobacco Use   • Smoking status: Current Every Day Smoker     Packs/day: 0.50     Years: 5.00     Pack years: 2.50     Types: Cigarettes   • Smokeless tobacco: Never Used   Substance and Sexual Activity   • Alcohol use: Yes     Frequency: Monthly or less     Comment: very rare   • Drug use: No   • Sexual activity: Not on file       SURGICAL HISTORY   has a past surgical history that includes gyn surgery and " "other.    CURRENT MEDICATIONS  Home Medications    **Home medications have not yet been reviewed for this encounter**         ALLERGIES  Allergies   Allergen Reactions   • Amoxicillin Unspecified     Gi problem       PHYSICAL EXAM  VITAL SIGNS: /90   Pulse 100   Temp 37 °C (98.6 °F) (Oral)   Resp 18   Ht 1.6 m (5' 3\")   Wt 59.8 kg (131 lb 13.4 oz)   LMP 08/23/2020 (Exact Date)   SpO2 97%   Breastfeeding No   BMI 23.35 kg/m²    Pulse ox interpretation: I interpret this pulse ox as normal.  Physical Exam   Constitutional: She is oriented to person, place, and time.   Well nourished 30 year old woman appears uncomfortable   HENT:   Head: Normocephalic and atraumatic.   Right Ear: External ear normal.   Left Ear: External ear normal.   At right mandible there is significant induration obscuring the border of the mandible extending toward neck, very tender to touch, warm   Eyes: Pupils are equal, round, and reactive to light. Conjunctivae and EOM are normal. No scleral icterus.   Neck: Normal range of motion.   Cardiovascular: Normal heart sounds and intact distal pulses.   No murmur heard.  Slightly increased rate   Pulmonary/Chest: Effort normal and breath sounds normal. No stridor. No respiratory distress.   Abdominal: Soft. She exhibits no distension. There is no abdominal tenderness.   Musculoskeletal: Normal range of motion.         General: No edema.   Neurological: She is alert and oriented to person, place, and time. No cranial nerve deficit.   Skin: Skin is warm and dry.   Psychiatric: Mood, memory, affect and judgment normal.   Nursing note and vitals reviewed.        COURSE & MEDICAL DECISION MAKING  Pertinent Labs & Imaging studies reviewed. (See chart for details)    4:56 PM This is an emergent evaluation of a 30 y.o., female who presents with acute onset rapid swelling in the right side of her face.  She is febrile, has heart rate over 90 which is concerning for infection.  On exam I have " concerns for dental infection that is progressing to Timothy angina, facial cellulitis.  Work-up will include CBC, CMP, lactic acid, blood cultures, pregnancy test, CT soft tissue neck      She was found to have an elevated white count of 14.  CMP within acceptable limits.  She is not pregnant.  CT did not show dental infection but did reveal likely cause of infection as sialadenitis.  Dr. Guallpa will arrange for hospitalization so that she can receive IV antibiotics. No specialist consultation needed at this time.         FINAL IMPRESSION  1. Sialadenitis    2. Sepsis without acute organ dysfunction, due to unspecified organism (HCC)            Electronically signed by: Julio Cesar Sands II, M.D., 9/6/2020 4:56 PM

## 2020-09-07 VITALS
HEART RATE: 57 BPM | HEIGHT: 63 IN | DIASTOLIC BLOOD PRESSURE: 59 MMHG | RESPIRATION RATE: 18 BRPM | WEIGHT: 131.84 LBS | SYSTOLIC BLOOD PRESSURE: 110 MMHG | OXYGEN SATURATION: 96 % | TEMPERATURE: 98.5 F | BODY MASS INDEX: 23.36 KG/M2

## 2020-09-07 LAB
ALBUMIN SERPL BCP-MCNC: 3.7 G/DL (ref 3.2–4.9)
ALBUMIN/GLOB SERPL: 1.1 G/DL
ALP SERPL-CCNC: 108 U/L (ref 30–99)
ALT SERPL-CCNC: 19 U/L (ref 2–50)
ANION GAP SERPL CALC-SCNC: 11 MMOL/L (ref 7–16)
AST SERPL-CCNC: 28 U/L (ref 12–45)
BASOPHILS # BLD AUTO: 0.4 % (ref 0–1.8)
BASOPHILS # BLD: 0.04 K/UL (ref 0–0.12)
BILIRUB SERPL-MCNC: 1 MG/DL (ref 0.1–1.5)
BUN SERPL-MCNC: 11 MG/DL (ref 8–22)
CALCIUM SERPL-MCNC: 8.4 MG/DL (ref 8.4–10.2)
CHLORIDE SERPL-SCNC: 97 MMOL/L (ref 96–112)
CO2 SERPL-SCNC: 26 MMOL/L (ref 20–33)
CREAT SERPL-MCNC: 0.65 MG/DL (ref 0.5–1.4)
EOSINOPHIL # BLD AUTO: 0.14 K/UL (ref 0–0.51)
EOSINOPHIL NFR BLD: 1.5 % (ref 0–6.9)
ERYTHROCYTE [DISTWIDTH] IN BLOOD BY AUTOMATED COUNT: 40.3 FL (ref 35.9–50)
GLOBULIN SER CALC-MCNC: 3.3 G/DL (ref 1.9–3.5)
GLUCOSE SERPL-MCNC: 90 MG/DL (ref 65–99)
HCT VFR BLD AUTO: 39.3 % (ref 37–47)
HGB BLD-MCNC: 13.4 G/DL (ref 12–16)
IMM GRANULOCYTES # BLD AUTO: 0.04 K/UL (ref 0–0.11)
IMM GRANULOCYTES NFR BLD AUTO: 0.4 % (ref 0–0.9)
LACTATE BLD-SCNC: 0.9 MMOL/L (ref 0.5–2)
LYMPHOCYTES # BLD AUTO: 1.86 K/UL (ref 1–4.8)
LYMPHOCYTES NFR BLD: 19.4 % (ref 22–41)
MAGNESIUM SERPL-MCNC: 1.8 MG/DL (ref 1.5–2.5)
MCH RBC QN AUTO: 31 PG (ref 27–33)
MCHC RBC AUTO-ENTMCNC: 34.1 G/DL (ref 33.6–35)
MCV RBC AUTO: 91 FL (ref 81.4–97.8)
MONOCYTES # BLD AUTO: 1.02 K/UL (ref 0–0.85)
MONOCYTES NFR BLD AUTO: 10.6 % (ref 0–13.4)
NEUTROPHILS # BLD AUTO: 6.51 K/UL (ref 2–7.15)
NEUTROPHILS NFR BLD: 67.7 % (ref 44–72)
NRBC # BLD AUTO: 0 K/UL
NRBC BLD-RTO: 0 /100 WBC
PLATELET # BLD AUTO: 280 K/UL (ref 164–446)
PMV BLD AUTO: 8.5 FL (ref 9–12.9)
POTASSIUM SERPL-SCNC: 3.5 MMOL/L (ref 3.6–5.5)
PROT SERPL-MCNC: 7 G/DL (ref 6–8.2)
RBC # BLD AUTO: 4.32 M/UL (ref 4.2–5.4)
SARS-COV-2 RNA RESP QL NAA+PROBE: NOTDETECTED
SODIUM SERPL-SCNC: 134 MMOL/L (ref 135–145)
SPECIMEN SOURCE: NORMAL
WBC # BLD AUTO: 9.6 K/UL (ref 4.8–10.8)

## 2020-09-07 PROCEDURE — 80053 COMPREHEN METABOLIC PANEL: CPT

## 2020-09-07 PROCEDURE — A9270 NON-COVERED ITEM OR SERVICE: HCPCS | Performed by: NURSE PRACTITIONER

## 2020-09-07 PROCEDURE — G0378 HOSPITAL OBSERVATION PER HR: HCPCS

## 2020-09-07 PROCEDURE — 700101 HCHG RX REV CODE 250: Performed by: HOSPITALIST

## 2020-09-07 PROCEDURE — A9270 NON-COVERED ITEM OR SERVICE: HCPCS | Performed by: HOSPITALIST

## 2020-09-07 PROCEDURE — 36415 COLL VENOUS BLD VENIPUNCTURE: CPT

## 2020-09-07 PROCEDURE — 700102 HCHG RX REV CODE 250 W/ 637 OVERRIDE(OP): Performed by: HOSPITALIST

## 2020-09-07 PROCEDURE — 99217 PR OBSERVATION CARE DISCHARGE: CPT | Performed by: HOSPITALIST

## 2020-09-07 PROCEDURE — 700102 HCHG RX REV CODE 250 W/ 637 OVERRIDE(OP): Performed by: NURSE PRACTITIONER

## 2020-09-07 PROCEDURE — 83735 ASSAY OF MAGNESIUM: CPT

## 2020-09-07 PROCEDURE — 85025 COMPLETE CBC W/AUTO DIFF WBC: CPT

## 2020-09-07 PROCEDURE — 83605 ASSAY OF LACTIC ACID: CPT

## 2020-09-07 RX ORDER — CLINDAMYCIN HYDROCHLORIDE 150 MG/1
450 CAPSULE ORAL 3 TIMES DAILY
Qty: 90 CAP | Refills: 0 | Status: SHIPPED | OUTPATIENT
Start: 2020-09-07 | End: 2020-09-17

## 2020-09-07 RX ORDER — POTASSIUM CHLORIDE 20 MEQ/1
20 TABLET, EXTENDED RELEASE ORAL ONCE
Status: COMPLETED | OUTPATIENT
Start: 2020-09-07 | End: 2020-09-07

## 2020-09-07 RX ORDER — OXYCODONE HYDROCHLORIDE 5 MG/1
2.5 TABLET ORAL EVERY 6 HOURS PRN
Qty: 6 TAB | Refills: 0 | Status: SHIPPED | OUTPATIENT
Start: 2020-09-07 | End: 2020-09-10

## 2020-09-07 RX ADMIN — POTASSIUM CHLORIDE 20 MEQ: 20 TABLET, EXTENDED RELEASE ORAL at 07:45

## 2020-09-07 RX ADMIN — CLINDAMYCIN IN 5 PERCENT DEXTROSE 600 MG: 12 INJECTION, SOLUTION INTRAVENOUS at 06:21

## 2020-09-07 RX ADMIN — OXYCODONE HYDROCHLORIDE 5 MG: 5 TABLET ORAL at 10:08

## 2020-09-07 RX ADMIN — NICOTINE 14 MG: 14 PATCH TRANSDERMAL at 06:18

## 2020-09-07 RX ADMIN — OXYCODONE HYDROCHLORIDE 5 MG: 5 TABLET ORAL at 13:26

## 2020-09-07 ASSESSMENT — PAIN DESCRIPTION - PAIN TYPE
TYPE: ACUTE PAIN
TYPE: ACUTE PAIN

## 2020-09-07 NOTE — DISCHARGE INSTRUCTIONS
Oxycodone tablets or capsules  What is this medicine?  OXYCODONE (ox i KOE done) is a pain reliever. It is used to treat moderate to severe pain.  This medicine may be used for other purposes; ask your health care provider or pharmacist if you have questions.  COMMON BRAND NAME(S): Dazidox, Endocodone, Oxaydo, OXECTA, OxyIR, Percolone, Roxicodone, Roxybond  What should I tell my health care provider before I take this medicine?  They need to know if you have any of these conditions:  · Silva's disease  · brain tumor  · head injury  · heart disease  · history of drug or alcohol abuse problem  · if you often drink alcohol  · kidney disease  · liver disease  · lung or breathing disease, like asthma  · mental illness  · pancreatic disease  · seizures  · thyroid disease  · an unusual or allergic reaction to oxycodone, codeine, hydrocodone, morphine, other medicines, foods, dyes, or preservatives  · pregnant or trying to get pregnant  · breast-feeding  How should I use this medicine?  Take this medicine by mouth with a glass of water. Follow the directions on the prescription label. You can take it with or without food. If it upsets your stomach, take it with food. Take your medicine at regular intervals. Do not take it more often than directed. Do not stop taking except on your doctor's advice.  Some brands of this medicine, like Oxecta, have special instructions. Ask your doctor or pharmacist if these directions are for you: Do not cut, crush or chew this medicine. Swallow only one tablet at a time. Do not wet, soak, or lick the tablet before you take it.  A special MedGuide will be given to you by the pharmacist with each prescription and refill. Be sure to read this information carefully each time.  Talk to your pediatrician regarding the use of this medicine in children. Special care may be needed.  Overdosage: If you think you have taken too much of this medicine contact a poison control center or emergency room  at once.  NOTE: This medicine is only for you. Do not share this medicine with others.  What if I miss a dose?  If you miss a dose, take it as soon as you can. If it is almost time for your next dose, take only that dose. Do not take double or extra doses.  What may interact with this medicine?  This medicine may interact with the following medications:  · alcohol  · antihistamines for allergy, cough and cold  · antiviral medicines for HIV or AIDS  · atropine  · certain antibiotics like clarithromycin, erythromycin, linezolid, rifampin  · certain medicines for anxiety or sleep  · certain medicines for bladder problems like oxybutynin, tolterodine  · certain medicines for depression like amitriptyline, fluoxetine, sertraline  · certain medicines for fungal infections like ketoconazole, itraconazole, voriconazole  · certain medicines for migraine headache like almotriptan, eletriptan, frovatriptan, naratriptan, rizatriptan, sumatriptan, zolmitriptan  · certain medicines for nausea or vomiting like dolasetron, ondansetron, palonosetron  · certain medicines for Parkinson's disease like benztropine, trihexyphenidyl  · certain medicines for seizures like phenobarbital, phenytoin, primidone  · certain medicines for stomach problems like dicyclomine, hyoscyamine  · certain medicines for travel sickness like scopolamine  · diuretics  · general anesthetics like halothane, isoflurane, methoxyflurane, propofol  · ipratropium  · local anesthetics like lidocaine, pramoxine, tetracaine  · MAOIs like Carbex, Eldepryl, Marplan, Nardil, and Parnate  · medicines that relax muscles for surgery  · methylene blue  · nilotinib  · other narcotic medicines for pain or cough  · phenothiazines like chlorpromazine, mesoridazine, prochlorperazine, thioridazine  This list may not describe all possible interactions. Give your health care provider a list of all the medicines, herbs, non-prescription drugs, or dietary supplements you use. Also tell  them if you smoke, drink alcohol, or use illegal drugs. Some items may interact with your medicine.  What should I watch for while using this medicine?  Tell your doctor or health care professional if your pain does not go away, if it gets worse, or if you have new or a different type of pain. You may develop tolerance to the medicine. Tolerance means that you will need a higher dose of the medicine for pain relief. Tolerance is normal and is expected if you take this medicine for a long time.  Do not suddenly stop taking your medicine because you may develop a severe reaction. Your body becomes used to the medicine. This does NOT mean you are addicted. Addiction is a behavior related to getting and using a drug for a non-medical reason. If you have pain, you have a medical reason to take pain medicine. Your doctor will tell you how much medicine to take. If your doctor wants you to stop the medicine, the dose will be slowly lowered over time to avoid any side effects.  There are different types of narcotic medicines (opiates). If you take more than one type at the same time or if you are taking another medicine that also causes drowsiness, you may have more side effects. Give your health care provider a list of all medicines you use. Your doctor will tell you how much medicine to take. Do not take more medicine than directed. Call emergency for help if you have problems breathing or unusual sleepiness.  You may get drowsy or dizzy. Do not drive, use machinery, or do anything that needs mental alertness until you know how the medicine affects you. Do not stand or sit up quickly, especially if you are an older patient. This reduces the risk of dizzy or fainting spells. Alcohol may interfere with the effect of this medicine. Avoid alcoholic drinks.  This medicine will cause constipation. Try to have a bowel movement at least every 2 to 3 days. If you do not have a bowel movement for 3 days, call your doctor or health  care professional.  Your mouth may get dry. Chewing sugarless gum or sucking hard candy, and drinking plenty of water may help. Contact your doctor if the problem does not go away or is severe.  What side effects may I notice from receiving this medicine?  Side effects that you should report to your doctor or health care professional as soon as possible:  · allergic reactions like skin rash, itching or hives, swelling of the face, lips, or tongue  · breathing problems  · confusion  · signs and symptoms of low blood pressure like dizziness; feeling faint or lightheaded, falls; unusually weak or tired  · trouble passing urine or change in the amount of urine  · trouble swallowing  Side effects that usually do not require medical attention (report to your doctor or health care professional if they continue or are bothersome):  · constipation  · dry mouth  · nausea, vomiting  · tiredness  This list may not describe all possible side effects. Call your doctor for medical advice about side effects. You may report side effects to FDA at 6-435-FTS-3396.  Where should I keep my medicine?  Keep out of the reach of children. This medicine can be abused. Keep your medicine in a safe place to protect it from theft. Do not share this medicine with anyone. Selling or giving away this medicine is dangerous and against the law.  Store at room temperature between 15 and 30 degrees C (59 and 86 degrees F). Protect from light. Keep container tightly closed.  This medicine may cause harm and death if it is taken by other adults, children, or pets. Return medicine that has not been used to an official disposal site. Contact the [facility] at [phone] or your [facility] government to find a site. If you cannot return the medicine, flush it down the toilet. Do not use the medicine after the expiration date.  NOTE: This sheet is a summary. It may not cover all possible information. If you have questions about this medicine, talk to your  doctor, pharmacist, or health care provider.  © 2020 [employer]/Gold Standard (2018-04-24 16:13:10)  Clindamycin capsules  What is this medicine?  CLINDAMYCIN (VICTOR HUGO Nelson) is a lincosamide antibiotic. It is used to treat certain kinds of bacterial infections. It will not work for colds, flu, or other viral infections.  This medicine may be used for other purposes; ask your health care provider or pharmacist if you have questions.  COMMON BRAND NAME(S): Cleocin  What should I tell my health care provider before I take this medicine?  They need to know if you have any of these conditions:  · kidney disease  · liver disease  · stomach problems like colitis  · an unusual or allergic reaction to clindamycin, lincomycin, or other medicines, foods, dyes like tartrazine or preservatives  · pregnant or trying to get pregnant  · breast-feeding  How should I use this medicine?  Take this medicine by mouth with a full glass of water. Follow the directions on the prescription label. You can take this medicine with food or on an empty stomach. If the medicine upsets your stomach, take it with food. Take your medicine at regular intervals. Do not take your medicine more often than directed. Take all of your medicine as directed even if you think your are better. Do not skip doses or stop your medicine early.  Talk to your pediatrician regarding the use of this medicine in children. Special care may be needed.  Overdosage: If you think you have taken too much of this medicine contact a poison control center or emergency room at once.  NOTE: This medicine is only for you. Do not share this medicine with others.  What if I miss a dose?  If you miss a dose, take it as soon as you can. If it is almost time for your next dose, take only that dose. Do not take double or extra doses.  What may interact with this medicine?  · birth control pills  · erythromycin  · medicines that relax muscles for surgery  · rifampin  This list may not  describe all possible interactions. Give your health care provider a list of all the medicines, herbs, non-prescription drugs, or dietary supplements you use. Also tell them if you smoke, drink alcohol, or use illegal drugs. Some items may interact with your medicine.  What should I watch for while using this medicine?  Tell your doctor or health care provider if your symptoms do not start to get better or if they get worse.  This medicine may cause serious skin reactions. They can happen weeks to months after starting the medicine. Contact your health care provider right away if you notice fevers or flu-like symptoms with a rash. The rash may be red or purple and then turn into blisters or peeling of the skin. Or, you might notice a red rash with swelling of the face, lips or lymph nodes in your neck or under your arms.  Do not treat diarrhea with over the counter products. Contact your doctor if you have diarrhea that lasts more than 2 days or if it is severe and watery.  What side effects may I notice from receiving this medicine?  Side effects that you should report to your doctor or health care professional as soon as possible:  · allergic reactions like skin rash, itching or hives, swelling of the face, lips, or tongue  · dark urine  · pain on swallowing  · rash, fever, and swollen lymph nodes  · redness, blistering, peeling or loosening of the skin, including inside the mouth  · unusual bleeding or bruising  · unusually weak or tired  · yellowing of eyes or skin  Side effects that usually do not require medical attention (report to your doctor or health care professional if they continue or are bothersome):  · diarrhea  · itching in the rectal or genital area  · joint pain  · nausea, vomiting  · stomach pain  This list may not describe all possible side effects. Call your doctor for medical advice about side effects. You may report side effects to FDA at 7-186-FIF-3531.  Where should I keep my medicine?  Keep  out of the reach of children.  Store at room temperature between 20 and 25 degrees C (68 and 77 degrees F). Throw away any unused medicine after the expiration date.  NOTE: This sheet is a summary. It may not cover all possible information. If you have questions about this medicine, talk to your doctor, pharmacist, or health care provider.  © 2020 Elsevier/Gold Standard (2020-03-19 12:02:12)  Discharge Instructions    Discharged to home by car with relative. Discharged via wheelchair, hospital escort: Yes.  Special equipment needed: none    Be sure to schedule a follow-up appointment with your primary care doctor or any specialists as instructed.     Discharge Plan:        I understand that a diet low in cholesterol, fat, and sodium is recommended for good health. Unless I have been given specific instructions below for another diet, I accept this instruction as my diet prescription.   Other diet: regular as tolerated    Special Instructions: None    · Is patient discharged on Warfarin / Coumadin?   No     Depression / Suicide Risk    As you are discharged from this [facility] facility, it is important to learn how to keep safe from harming yourself.    Recognize the warning signs:  · Abrupt changes in personality, positive or negative- including increase in energy   · Giving away possessions  · Change in eating patterns- significant weight changes-  positive or negative  · Change in sleeping patterns- unable to sleep or sleeping all the time   · Unwillingness or inability to communicate  · Depression  · Unusual sadness, discouragement and loneliness  · Talk of wanting to die  · Neglect of personal appearance   · Rebelliousness- reckless behavior  · Withdrawal from people/activities they love  · Confusion- inability to concentrate     If you or a loved one observes any of these behaviors or has concerns about self-harm, here's what you can do:  · Talk about it- your feelings and reasons for harming  yourself  · Remove any means that you might use to hurt yourself (examples: pills, rope, extension cords, firearm)  · Get professional help from the community (Mental Health, Substance Abuse, psychological counseling)  · Do not be alone:Call your Safe Contact- someone whom you trust who will be there for you.  · Call your local CRISIS HOTLINE 329-7804 or 510-401-7076  · Call your local Children's Mobile Crisis Response Team [place] (493) 866-7833 or www.viavoo  · Call the toll free National Suicide Prevention Hotlines   · National Suicide Prevention Lifeline 664-297-WVXS (2422)  · National Hope Line Network 800-SUICIDE (282-6740)

## 2020-09-07 NOTE — ASSESSMENT & PLAN NOTE
CT imaging deep and superficial soft tissue swelling involving the RIGHT face and submandibular region with apparent involvement of right submandibular gland indicating some element of sialoadenitis. No discrete soft tissue fluid collection to indicate drainable abscess. Right jugular chain and submandibular adenopathy.  Started on clindamycin in the emergency department, will continue, follow on cultures and sensitivities

## 2020-09-07 NOTE — H&P
"Hospital Medicine History & Physical Note    Date of Service  9/6/2020    Primary Care Physician  Lacy Jernigan D.O.    Consultants  None     Code Status  Full Code    Chief Complaint  Chief Complaint   Patient presents with   • Dental Pain     RT lower jaw x 2 d No fever \" I have a broken tooth \" x 6 mons   • Facial Swelling       History of Presenting Illness  30 y.o. female with a past medical history of anxiety, asthma and tobacco use who presented 9/6/2020 with right-sided facial swelling and pain.  Patient reports that she started to have progressive swelling of the right side of her face over the past 3-4 days she also has chills but no fevers.  Patient also reports progressive pain on the same side pain is 10 out of 10 worse with trying to eat and moving her jaw, no radiation to other places.  Pain is rated 10 out of 10.  Pressure-like dull in character.  Patient denies having drooling or choking.  She denies having fevers shortness of breath, cough or contact with sick people.    Review of Systems  Review of Systems   Constitutional: Positive for chills. Negative for fever.   HENT: Negative for congestion and sore throat.         Right-sided facial swelling   Eyes: Negative for pain, discharge and redness.   Respiratory: Negative for cough, hemoptysis, sputum production, shortness of breath and stridor.    Cardiovascular: Negative for chest pain, palpitations and leg swelling.   Gastrointestinal: Negative for abdominal pain, blood in stool, diarrhea and vomiting.   Genitourinary: Negative for flank pain, hematuria and urgency.   Musculoskeletal: Negative for myalgias.   Skin: Negative.    Neurological: Negative for speech change, focal weakness, seizures and loss of consciousness.   Endo/Heme/Allergies: Does not bruise/bleed easily.   Psychiatric/Behavioral: Negative for hallucinations and suicidal ideas. The patient is not nervous/anxious.      Past Medical History   has a past medical history of " ASTHMA, Ovarian cyst, and Psychiatric disorder.    Surgical History   has a past surgical history that includes gyn surgery and other.     Family History  family history includes Hypertension in her mother.     Social History   reports that she has been smoking cigarettes. She has a 2.50 pack-year smoking history. She has never used smokeless tobacco. She reports current alcohol use. She reports that she does not use drugs.    Allergies  Allergies   Allergen Reactions   • Amoxicillin Unspecified     Gi problem     Medications  Prior to Admission Medications   Prescriptions Last Dose Informant Patient Reported? Taking?   amoxicillin (AMOXIL) 500 MG Cap   No No   Sig: Take 1 Cap by mouth 3 times a day.   amoxicillin (AMOXIL) 500 MG Cap   No No   Sig: Take 1 Cap by mouth 3 times a day.   hydrocodone-acetaminophen (NORCO) 5-325 MG Tab per tablet   No No   Sig: Take 1 Tab by mouth every 6 hours as needed.   tramadol (ULTRAM) 50 MG Tab   No No   Sig: Take 1-2 Tabs by mouth every 6 hours as needed for Moderate Pain (pain).      Facility-Administered Medications: None     Physical Exam  Temp:  [37 °C (98.6 °F)-38 °C (100.4 °F)] 37 °C (98.6 °F)  Pulse:  [] 100  Resp:  [16-18] 18  BP: (104-132)/(55-90) 132/90  SpO2:  [94 %-97 %] 97 %    Physical Exam  Constitutional:       General: She is not in acute distress.     Appearance: She is not toxic-appearing.   HENT:      Head: Normocephalic and atraumatic.      Comments: Right-sided facial swelling and tenderness     Right Ear: External ear normal.      Left Ear: External ear normal.      Nose: No congestion or rhinorrhea.      Mouth/Throat:      Mouth: Mucous membranes are moist.      Pharynx: No oropharyngeal exudate or posterior oropharyngeal erythema.   Eyes:      General: No scleral icterus.        Right eye: No discharge.         Left eye: No discharge.      Conjunctiva/sclera: Conjunctivae normal.      Pupils: Pupils are equal, round, and reactive to light.   Neck:       Musculoskeletal: Neck supple. No neck rigidity or muscular tenderness.   Cardiovascular:      Rate and Rhythm: Tachycardia present.      Heart sounds: No friction rub. No gallop.    Pulmonary:      Breath sounds: No stridor. No wheezing or rhonchi.   Abdominal:      General: There is no distension.      Palpations: Abdomen is soft.      Tenderness: There is no abdominal tenderness. There is no guarding or rebound.   Musculoskeletal:         General: No swelling.      Right lower leg: No edema.      Left lower leg: No edema.   Skin:     General: Skin is warm.      Coloration: Skin is not jaundiced or pale.      Findings: No bruising or erythema.   Neurological:      Mental Status: She is alert and oriented to person, place, and time.      Cranial Nerves: No cranial nerve deficit.      Sensory: No sensory deficit.      Motor: No weakness.   Psychiatric:         Mood and Affect: Mood normal.         Judgment: Judgment normal.       Laboratory:  Recent Labs     09/06/20  1735   WBC 14.4*   RBC 4.76   HEMOGLOBIN 14.7   HEMATOCRIT 42.1   MCV 88.4   MCH 30.9   MCHC 34.9   RDW 39.3   PLATELETCT 326   MPV 8.6*     Recent Labs     09/06/20  1735   SODIUM 137   POTASSIUM 3.9   CHLORIDE 98   CO2 24   GLUCOSE 93   BUN 8   CREATININE 0.65   CALCIUM 9.3     Recent Labs     09/06/20  1735   ALTSGPT 23   ASTSGOT 36   ALKPHOSPHAT 129*   TBILIRUBIN 1.0   GLUCOSE 93         No results for input(s): NTPROBNP in the last 72 hours.      No results for input(s): TROPONINT in the last 72 hours.    Imaging:  CT-SOFT TISSUE NECK WITH   Final Result      1.  Deep and superficial soft tissue swelling involving the RIGHT face and submandibular region with apparent involvement of RIGHT submandibular gland indicating some element of sialoadenitis.   2.  No discrete soft tissue fluid collection to indicate drainable abscess.   3.  RIGHT jugular chain and submandibular adenopathy.   4.  Degenerative changes cervical spine with reversal of  curvature.   5.  Multiple dental caries without gross tooth abscess.        Assessment/Plan:  I anticipate this patient is appropriate for observation status at this time.    * Sialoadenitis  Assessment & Plan  CT imaging deep and superficial soft tissue swelling involving the RIGHT face and submandibular region with apparent involvement of right submandibular gland indicating some element of sialoadenitis. No discrete soft tissue fluid collection to indicate drainable abscess. Right jugular chain and submandibular adenopathy.  Started on clindamycin in the emergency department, will continue, follow on cultures and sensitivities      Asthma  Assessment & Plan  Oxygen as needed, albuterol as needed, Incentive spirometry      Anxiety  Assessment & Plan  Trazodone as needed    Tobacco use- (present on admission)  Assessment & Plan  Less than 10 packs a day.  I spent nearly  4 minutes on Tobacco cessation education and counseling.   I Discussed the benefits of quitting smoking and risks of continued smoking including cardiovascular disease, cancer and COPD.   Discussed options of nicotine patch, medical treatment with Wellbutrin [Bupropion] and Chantix [Varenicline].

## 2020-09-07 NOTE — PROGRESS NOTES
0700: Bedside report from Arianna LYNCH RN. Pt wakes to voice. VSS. Pt belongings within reach. Pt denies pain and nausea at this time. Will continue to monitor. Call light within reach. No needs at this time.     Pending hospitalist for rounds.

## 2020-09-07 NOTE — ASSESSMENT & PLAN NOTE
Less than 10 packs a day.  I spent nearly  4 minutes on Tobacco cessation education and counseling.   I Discussed the benefits of quitting smoking and risks of continued smoking including cardiovascular disease, cancer and COPD.   Discussed options of nicotine patch, medical treatment with Wellbutrin [Bupropion] and Chantix [Varenicline].

## 2020-09-07 NOTE — CARE PLAN
Problem: Communication  Goal: The ability to communicate needs accurately and effectively will improve  Outcome: PROGRESSING AS EXPECTED  Note: Pt is able to communicate needs to staff appropriately. Pt educated to use call light when in need of assistance. Will continue to monitor.     Problem: Safety  Goal: Will remain free from falls  Outcome: PROGRESSING AS EXPECTED  Note: Pt has not fallen during this hospital admission. Pt ambulates steady. Pt educated to use call light when in need of assistance. Will continue to monitor.

## 2020-09-07 NOTE — DISCHARGE SUMMARY
"Discharge Summary    CHIEF COMPLAINT ON ADMISSION  Chief Complaint   Patient presents with   • Dental Pain     RT lower jaw x 2 d No fever \" I have a broken tooth \" x 6 mons   • Facial Swelling     Reason for Admission  Sialoadenitis    Admission Date  9/6/2020    CODE STATUS  Full Code    HPI & HOSPITAL COURSE  Ms. Harris is a 30-year-old female who presented to the emergency department on 9/6/2020 with right-sided facial swelling and pain x 3-4 days accompanied by chills but no fevers. She has had a broken tooth on the lower right side of her mouth for approximately the last 6-12 months.  She did have leukocytosis on arrival but her lab work was otherwise unremarkable, lactic acid levels remain normal.  Blood cultures were drawn on 9/6/2020 but have no growth to date.  A CT was done and showed both deep and superficial soft tissue swelling involving the right face and submandibular region consistent with sialoadenitis but was negative for abscess.  She was subsequently started on IV clindamycin and admitted to the hospital for further monitoring and treatment where she has shown continued improvement.  On the day of discharge her  leukocytosis has resolved, her vital signs are stable, her pain is controlled, and she is medically clear for discharge with close follow-up by her PCP.     Therefore, she is discharged in good and stable condition to home with close outpatient follow-up.    Discharge Date  9/7/2020    FOLLOW UP ITEMS POST DISCHARGE  PCP in 5-7 days.    DISCHARGE DIAGNOSES  Principal Problem:    Sialoadenitis POA: Yes  Active Problems:    Anxiety POA: Yes    Asthma POA: Yes    Tobacco use POA: Yes  Resolved Problems:    * No resolved hospital problems. *      FOLLOW UP  No future appointments.  Lacy Jernigan D.O.  [id number] [facility]  [place] [address] [phone]    Call  Follow-up appointment    MEDICATIONS ON DISCHARGE     Medication List      Start taking these medications      Instructions "   clindamycin 150 MG Caps  Commonly known as: CLEOCIN   Take 3 Caps by mouth 3 times a day for 10 days.  Dose: 450 mg     oxyCODONE immediate-release 5 MG Tabs  Commonly known as: ROXICODONE   Take 0.5 Tabs by mouth every 6 hours as needed for Severe Pain for up to 3 days.  Dose: 2.5 mg          Allergies  Allergies   Allergen Reactions   • Amoxicillin Unspecified     Gi problem     DIET  Orders Placed This Encounter   Procedures   • Diet Order     Standing Status:   Standing     Number of Occurrences:   1     Order Specific Question:   Diet:     Answer:   Regular [1]     ACTIVITY  As tolerated.  Exercise encouraged.  Weight bearing as tolerated    CONSULTATIONS  None    PROCEDURES  None    LABORATORY  Lab Results   Component Value Date    SODIUM 134 (L) 09/07/2020    POTASSIUM 3.5 (L) 09/07/2020    CHLORIDE 97 09/07/2020    CO2 26 09/07/2020    GLUCOSE 90 09/07/2020    BUN 11 09/07/2020    CREATININE 0.65 09/07/2020    CREATININE 0.9 11/16/2007      Lab Results   Component Value Date    WBC 9.6 09/07/2020    HEMOGLOBIN 13.4 09/07/2020    HEMATOCRIT 39.3 09/07/2020    PLATELETCT 280 09/07/2020      Total time of the discharge process exceeds 32 minutes.      Electronically signed by:  Promise Chau, MSN, RN, APRN, ACNPC-AG, CCRN  Nurse Practitioner, [facility]  Work # [phone]    9/7/2020    1:09 PM

## 2020-09-07 NOTE — PROGRESS NOTES
Received report from Yas MURGUIA at 2003. Reported pt's VSS. Pt transferred up to floor at 2017 via wheelchair. Pt ambulated to the bed. Pt ambulating in the room with family at bedside. Pt is awake, alert and pleasant. Pt is AOx4. No signs of distress. Plan of care reviewed with pt. Questions encouraged and answered. IV patent and connected to IV fluids. Bed locked and in lowest position with call light within reach. Will continue to monitor.

## 2020-09-07 NOTE — PROGRESS NOTES
Pain controlled. VSS. Pt given discharge instructions, pt verbalizes understanding of discharge instructions, all questions answered. IV DC'd. Pt told to follow up with Dr. Jernigan. Meets all criteria for DC.

## 2020-09-07 NOTE — ED NOTES
Pt alert and oriented, airway patent. Swelling noted on right, lower face. Pt denies difficulty breathing or swallowing.

## 2020-09-11 LAB
BACTERIA BLD CULT: NORMAL
BACTERIA BLD CULT: NORMAL
SIGNIFICANT IND 70042: NORMAL
SIGNIFICANT IND 70042: NORMAL
SITE SITE: NORMAL
SITE SITE: NORMAL
SOURCE SOURCE: NORMAL
SOURCE SOURCE: NORMAL

## 2021-07-11 ENCOUNTER — HOSPITAL ENCOUNTER (EMERGENCY)
Dept: HOSPITAL 8 - ED | Age: 31
Discharge: HOME | End: 2021-07-11
Payer: COMMERCIAL

## 2021-07-11 VITALS — WEIGHT: 127.65 LBS | HEIGHT: 64 IN | BODY MASS INDEX: 21.79 KG/M2

## 2021-07-11 VITALS — DIASTOLIC BLOOD PRESSURE: 58 MMHG | SYSTOLIC BLOOD PRESSURE: 127 MMHG

## 2021-07-11 DIAGNOSIS — G92: ICD-10-CM

## 2021-07-11 DIAGNOSIS — F39: Primary | ICD-10-CM

## 2021-07-11 DIAGNOSIS — F15.10: ICD-10-CM

## 2021-07-11 LAB
ALBUMIN SERPL-MCNC: 4.4 G/DL (ref 3.4–5)
ALP SERPL-CCNC: 134 U/L (ref 45–117)
ALT SERPL-CCNC: 28 U/L (ref 12–78)
ANION GAP SERPL CALC-SCNC: 6 MMOL/L (ref 5–15)
BASOPHILS # BLD AUTO: 0.1 X10^3/UL (ref 0–0.1)
BASOPHILS NFR BLD AUTO: 0 % (ref 0–1)
BILIRUB SERPL-MCNC: 0.7 MG/DL (ref 0.2–1)
CALCIUM SERPL-MCNC: 9.5 MG/DL (ref 8.5–10.1)
CHLORIDE SERPL-SCNC: 107 MMOL/L (ref 98–107)
CREAT SERPL-MCNC: 1.01 MG/DL (ref 0.55–1.02)
EOSINOPHIL # BLD AUTO: 0 X10^3/UL (ref 0–0.4)
EOSINOPHIL NFR BLD AUTO: 0 % (ref 1–7)
ERYTHROCYTE [DISTWIDTH] IN BLOOD BY AUTOMATED COUNT: 13.3 % (ref 9.6–15.2)
LYMPHOCYTES # BLD AUTO: 0.9 X10^3/UL (ref 1–3.4)
LYMPHOCYTES NFR BLD AUTO: 6 % (ref 22–44)
MCH RBC QN AUTO: 31.5 PG (ref 27–34.8)
MCHC RBC AUTO-ENTMCNC: 34.9 G/DL (ref 32.4–35.8)
MICROSCOPIC: (no result)
MONOCYTES # BLD AUTO: 1 X10^3/UL (ref 0.2–0.8)
MONOCYTES NFR BLD AUTO: 7 % (ref 2–9)
NEUTROPHILS # BLD AUTO: 13.8 X10^3/UL (ref 1.8–6.8)
NEUTROPHILS NFR BLD AUTO: 87 % (ref 42–75)
PLATELET # BLD AUTO: 378 X10^3/UL (ref 130–400)
PMV BLD AUTO: 7.3 FL (ref 7.4–10.4)
PROT SERPL-MCNC: 9.4 G/DL (ref 6.4–8.2)
RBC # BLD AUTO: 4.77 X10^6/UL (ref 3.82–5.3)
T4 FREE SERPL-MCNC: 1.19 NG/DL (ref 0.76–1.46)
VANCOMYCIN TROUGH SERPL-MCNC: 2.8 MG/DL (ref 2.8–20)

## 2021-07-11 PROCEDURE — 99283 EMERGENCY DEPT VISIT LOW MDM: CPT

## 2021-07-11 PROCEDURE — 80299 QUANTITATIVE ASSAY DRUG: CPT

## 2021-07-11 PROCEDURE — 80053 COMPREHEN METABOLIC PANEL: CPT

## 2021-07-11 PROCEDURE — 80307 DRUG TEST PRSMV CHEM ANLYZR: CPT

## 2021-07-11 PROCEDURE — 36415 COLL VENOUS BLD VENIPUNCTURE: CPT

## 2021-07-11 PROCEDURE — 84703 CHORIONIC GONADOTROPIN ASSAY: CPT

## 2021-07-11 PROCEDURE — 80320 DRUG SCREEN QUANTALCOHOLS: CPT

## 2021-07-11 PROCEDURE — 84443 ASSAY THYROID STIM HORMONE: CPT

## 2021-07-11 PROCEDURE — 85025 COMPLETE CBC W/AUTO DIFF WBC: CPT

## 2021-07-11 PROCEDURE — 80329 ANALGESICS NON-OPIOID 1 OR 2: CPT

## 2021-07-11 PROCEDURE — G0480 DRUG TEST DEF 1-7 CLASSES: HCPCS

## 2021-07-11 PROCEDURE — 81001 URINALYSIS AUTO W/SCOPE: CPT

## 2021-07-11 PROCEDURE — 84439 ASSAY OF FREE THYROXINE: CPT

## 2021-07-11 NOTE — NUR
PT BIB EMS FOR ALTERED LOC. WAS FOUND IN A PARKING LOT WITH NO SHOES OR PANTS 
AND WAS VOMITTING. WAS NOT ANSWERING QUESTIONS APPROPRIATLEY. UPON FIRST 
CONTACT PT WAS AOX1 AND WAS COMBATIVE. EMS ADM 4MG VERSED IM AND PLACED HER IN 
4 PT RESTRAINTS. UPON ARRIVAL TO ER PT IS AOX4 AND COOPERATIVE. PT DENIES DRUG 
OR ETOH USE. HX OF ANXIETY. NO MEDS.



WHEN ASKED WHERE PT IS FROM SHE SAID "IM FROM [facility]". PT ALSO STATES SHE WAS 
SET UP BY A GANG TO BE KIDNAPPED. PT CONNECTED TO MONITORING EQUIPMENT. BLANKET 
PROVIDED

## 2021-07-11 NOTE — NUR
Upon initial contact, pt calm and cooperative. A&Ox4 but no memory of what 
happened. Pt Denies drug/ etoh use. Pt requesting we contact her mother and 
wants water. Pt provided with water and meal tray. Ciarra HER talked with mother 
who is on way to hospital now.

## 2022-03-31 ENCOUNTER — HOSPITAL ENCOUNTER (EMERGENCY)
Facility: MEDICAL CENTER | Age: 32
End: 2022-03-31
Attending: EMERGENCY MEDICINE
Payer: MEDICAID

## 2022-03-31 VITALS
WEIGHT: 141.9 LBS | HEART RATE: 77 BPM | TEMPERATURE: 96.1 F | OXYGEN SATURATION: 96 % | HEIGHT: 63 IN | BODY MASS INDEX: 25.14 KG/M2 | DIASTOLIC BLOOD PRESSURE: 57 MMHG | RESPIRATION RATE: 18 BRPM | SYSTOLIC BLOOD PRESSURE: 104 MMHG

## 2022-03-31 DIAGNOSIS — G44.009 CLUSTER HEADACHE, NOT INTRACTABLE, UNSPECIFIED CHRONICITY PATTERN: ICD-10-CM

## 2022-03-31 LAB
ALBUMIN SERPL BCP-MCNC: 4 G/DL (ref 3.2–4.9)
ALBUMIN/GLOB SERPL: 1.1 G/DL
ALP SERPL-CCNC: 105 U/L (ref 30–99)
ALT SERPL-CCNC: 16 U/L (ref 2–50)
ANION GAP SERPL CALC-SCNC: 12 MMOL/L (ref 7–16)
AST SERPL-CCNC: 19 U/L (ref 12–45)
BASOPHILS # BLD AUTO: 0.4 % (ref 0–1.8)
BASOPHILS # BLD: 0.05 K/UL (ref 0–0.12)
BILIRUB SERPL-MCNC: 0.2 MG/DL (ref 0.1–1.5)
BUN SERPL-MCNC: 15 MG/DL (ref 8–22)
CALCIUM SERPL-MCNC: 9.8 MG/DL (ref 8.4–10.2)
CHLORIDE SERPL-SCNC: 97 MMOL/L (ref 96–112)
CO2 SERPL-SCNC: 25 MMOL/L (ref 20–33)
CREAT SERPL-MCNC: 0.74 MG/DL (ref 0.5–1.4)
EOSINOPHIL # BLD AUTO: 0.28 K/UL (ref 0–0.51)
EOSINOPHIL NFR BLD: 2.1 % (ref 0–6.9)
ERYTHROCYTE [DISTWIDTH] IN BLOOD BY AUTOMATED COUNT: 42.2 FL (ref 35.9–50)
GFR SERPLBLD CREATININE-BSD FMLA CKD-EPI: 111 ML/MIN/1.73 M 2
GLOBULIN SER CALC-MCNC: 3.7 G/DL (ref 1.9–3.5)
GLUCOSE SERPL-MCNC: 99 MG/DL (ref 65–99)
HCG SERPL QL: NEGATIVE
HCT VFR BLD AUTO: 42.4 % (ref 37–47)
HGB BLD-MCNC: 14.5 G/DL (ref 12–16)
IMM GRANULOCYTES # BLD AUTO: 0.07 K/UL (ref 0–0.11)
IMM GRANULOCYTES NFR BLD AUTO: 0.5 % (ref 0–0.9)
LIPASE SERPL-CCNC: 36 U/L (ref 7–58)
LYMPHOCYTES # BLD AUTO: 1.78 K/UL (ref 1–4.8)
LYMPHOCYTES NFR BLD: 13.3 % (ref 22–41)
MCH RBC QN AUTO: 30.5 PG (ref 27–33)
MCHC RBC AUTO-ENTMCNC: 34.2 G/DL (ref 33.6–35)
MCV RBC AUTO: 89.1 FL (ref 81.4–97.8)
MONOCYTES # BLD AUTO: 1 K/UL (ref 0–0.85)
MONOCYTES NFR BLD AUTO: 7.4 % (ref 0–13.4)
NEUTROPHILS # BLD AUTO: 10.25 K/UL (ref 2–7.15)
NEUTROPHILS NFR BLD: 76.3 % (ref 44–72)
NRBC # BLD AUTO: 0 K/UL
NRBC BLD-RTO: 0 /100 WBC
PLATELET # BLD AUTO: 361 K/UL (ref 164–446)
PMV BLD AUTO: 8.7 FL (ref 9–12.9)
POTASSIUM SERPL-SCNC: 4 MMOL/L (ref 3.6–5.5)
PROT SERPL-MCNC: 7.7 G/DL (ref 6–8.2)
RBC # BLD AUTO: 4.76 M/UL (ref 4.2–5.4)
SODIUM SERPL-SCNC: 134 MMOL/L (ref 135–145)
WBC # BLD AUTO: 13.4 K/UL (ref 4.8–10.8)

## 2022-03-31 PROCEDURE — 80053 COMPREHEN METABOLIC PANEL: CPT

## 2022-03-31 PROCEDURE — 85025 COMPLETE CBC W/AUTO DIFF WBC: CPT

## 2022-03-31 PROCEDURE — 96375 TX/PRO/DX INJ NEW DRUG ADDON: CPT

## 2022-03-31 PROCEDURE — 96374 THER/PROPH/DIAG INJ IV PUSH: CPT

## 2022-03-31 PROCEDURE — 36415 COLL VENOUS BLD VENIPUNCTURE: CPT

## 2022-03-31 PROCEDURE — 99284 EMERGENCY DEPT VISIT MOD MDM: CPT

## 2022-03-31 PROCEDURE — 700111 HCHG RX REV CODE 636 W/ 250 OVERRIDE (IP): Performed by: EMERGENCY MEDICINE

## 2022-03-31 PROCEDURE — 83690 ASSAY OF LIPASE: CPT

## 2022-03-31 PROCEDURE — A9270 NON-COVERED ITEM OR SERVICE: HCPCS | Performed by: EMERGENCY MEDICINE

## 2022-03-31 PROCEDURE — 84703 CHORIONIC GONADOTROPIN ASSAY: CPT

## 2022-03-31 PROCEDURE — 700105 HCHG RX REV CODE 258: Performed by: EMERGENCY MEDICINE

## 2022-03-31 PROCEDURE — 700102 HCHG RX REV CODE 250 W/ 637 OVERRIDE(OP): Performed by: EMERGENCY MEDICINE

## 2022-03-31 RX ORDER — PROCHLORPERAZINE EDISYLATE 5 MG/ML
10 INJECTION INTRAMUSCULAR; INTRAVENOUS ONCE
Status: COMPLETED | OUTPATIENT
Start: 2022-03-31 | End: 2022-03-31

## 2022-03-31 RX ORDER — ACETAMINOPHEN 500 MG
1000 TABLET ORAL ONCE
Status: COMPLETED | OUTPATIENT
Start: 2022-03-31 | End: 2022-03-31

## 2022-03-31 RX ORDER — SODIUM CHLORIDE 9 MG/ML
1000 INJECTION, SOLUTION INTRAVENOUS ONCE
Status: COMPLETED | OUTPATIENT
Start: 2022-03-31 | End: 2022-03-31

## 2022-03-31 RX ORDER — KETOROLAC TROMETHAMINE 30 MG/ML
15 INJECTION, SOLUTION INTRAMUSCULAR; INTRAVENOUS ONCE
Status: DISCONTINUED | OUTPATIENT
Start: 2022-03-31 | End: 2022-03-31 | Stop reason: HOSPADM

## 2022-03-31 RX ORDER — DIPHENHYDRAMINE HYDROCHLORIDE 50 MG/ML
50 INJECTION INTRAMUSCULAR; INTRAVENOUS ONCE
Status: COMPLETED | OUTPATIENT
Start: 2022-03-31 | End: 2022-03-31

## 2022-03-31 RX ADMIN — DIPHENHYDRAMINE HYDROCHLORIDE 50 MG: 50 INJECTION INTRAMUSCULAR; INTRAVENOUS at 18:59

## 2022-03-31 RX ADMIN — ACETAMINOPHEN 1000 MG: 500 TABLET, FILM COATED ORAL at 18:59

## 2022-03-31 RX ADMIN — PROCHLORPERAZINE EDISYLATE 10 MG: 5 INJECTION INTRAMUSCULAR; INTRAVENOUS at 18:59

## 2022-03-31 RX ADMIN — SODIUM CHLORIDE 1000 ML: 9 INJECTION, SOLUTION INTRAVENOUS at 18:58

## 2022-03-31 ASSESSMENT — FIBROSIS 4 INDEX: FIB4 SCORE: 0.69

## 2022-04-01 NOTE — ED TRIAGE NOTES
"Chief Complaint   Patient presents with   • Headache     X 1 day, left temporal area, constant all day, OTC medications not helping    • N/V     /74   Pulse 88   Temp 37.4 °C (99.4 °F) (Temporal)   Resp 16   Ht 1.6 m (5' 3\")   Wt 64.4 kg (141 lb 14.4 oz)   SpO2 97%   "

## 2022-04-01 NOTE — ED PROVIDER NOTES
"ED Provider Note    CHIEF COMPLAINT  Chief Complaint   Patient presents with   • Headache     X 1 day, left temporal area, constant all day, OTC medications not helping    • N/V     HPI  Is an otherwise healthy 31-year-old female presents emergency room accompanied by her mother for evaluation of new onset headache.  She states yesterday morning she had a slowly insidious headache on the left side of her temple.  She states that she was feeling very stressed at the time and is slowly developed over the course of the day and within several hours she was having some pressure sensations behind her bilateral eyes along with some left-sided eye discomfort.  There is no excess tearing, no vision loss, she has had no weakness, speech changes or other evolving complaints.  She states at that time she also struck the right side of her head without loss of consciousness but was concerned that maybe that was causing some more symptoms.  This is been present throughout the day and was not improved with OTC ibuprofen and she then states that she \"googled headaches,\" and was concerned that she may have a aneurysm or a head bleed, prompting her to come in for further evaluation.    Her Mother notes no other acute interval complaints, she has been not noticing any altered mental status, focal neurological deficits.    PPE Note: I personally donned full PPE for all patient encounters during this visit, including being clean-shaven with an N95 respirator mask, gloves, and goggles.     REVIEW OF SYSTEMS  See HPI for further details. All other systems are negative.     PAST MEDICAL HISTORY   has a past medical history of ASTHMA, Ovarian cyst, and Psychiatric disorder.    SOCIAL HISTORY  Social History     Tobacco Use   • Smoking status: Current Every Day Smoker     Packs/day: 0.50     Years: 5.00     Pack years: 2.50     Types: Cigarettes   • Smokeless tobacco: Never Used   Vaping Use   • Vaping Use: Some days   Substance and Sexual " "Activity   • Alcohol use: Yes     Comment: very rare   • Drug use: No   • Sexual activity: Not on file     SURGICAL HISTORY   has a past surgical history that includes gyn surgery and other.    CURRENT MEDICATIONS  Home Medications     Reviewed by Nery Kim R.N. (Registered Nurse) on 03/31/22 at 1809  Med List Status: <None>   Medication Last Dose Status        Patient Marc Taking any Medications                     ALLERGIES  No Known Allergies    PHYSICAL EXAM  VITAL SIGNS: /57   Pulse 77   Temp (!) 35.6 °C (96.1 °F) (Temporal)   Resp 18   Ht 1.6 m (5' 3\")   Wt 64.4 kg (141 lb 14.4 oz)   LMP 03/17/2022   SpO2 96%   BMI 25.14 kg/m²   Pulse ox interpretation: I interpret this pulse ox as normal.  Genl: F sitting in chair comfortably, speaking clearly, appears anxious but in no acute distress  Head: NC/AT   ENT: Mucous membranes moist, posterior pharynx clear, uvula midline, nares patent bilaterally   Eyes: Normal sclera, pupils equal round reactive to light  Neck: Supple, FROM, no LAD appreciated   Pulmonary: Lungs are clear to auscultation bilaterally  Chest: No TTP  CV:  RRR, no murmur appreciated, pulses 2+ in both upper and lower extremities  Abdomen: soft, NT/ND; no rebound/guarding, no masses palpated, no HSM  Musculoskeletal: Pain free ROM of the neck. Moving upper and lower extremities and spontaneous in coordinated fashion  Neuro: Mental Status: Speech fluent without errors. Follows all commands. No dysarthria or apraxia.  Cranial Nerves: Pupils equal round and reactive to light. Extraocular motion intact. Visual fields intact. No nystagmus. CN V1-V3 intact to light touch. No facial asymmetry. Hearing clinically intact bilaterally. Tongue protrusion midline. No uvular deviation. Normal shoulder shrug and head turn.  Motor:  RUE: 5/5 with hand , 5/5 with flexion at the elbow 5/5 with extension at the elbow  LUE: 5/5 with hand , 5/5 with flexion at the elbow 5/5 with extension " at the elbow  RLE: 5/5 with leg raise, 5/5 with plantar flexion, 5/5 with dorsal flexion  LLE: 5/5 with leg raise, 5/5 with plantar flexion, 5/5 with dorsal flexion  Sensation to light touch intact throughout  Reflexes 2+ Patellar tendons  No ataxia noted  Rapidly alternating movements without difficulty    DIAGNOSTIC STUDIES / PROCEDURES    LABS  Labs Reviewed   CBC WITH DIFFERENTIAL - Abnormal; Notable for the following components:       Result Value    WBC 13.4 (*)     MPV 8.7 (*)     Neutrophils-Polys 76.30 (*)     Lymphocytes 13.30 (*)     Neutrophils (Absolute) 10.25 (*)     Monos (Absolute) 1.00 (*)     All other components within normal limits   COMP METABOLIC PANEL - Abnormal; Notable for the following components:    Sodium 134 (*)     Alkaline Phosphatase 105 (*)     Globulin 3.7 (*)     All other components within normal limits   LIPASE   HCG QUAL SERUM   ESTIMATED GFR     RADIOLOGY  No orders to display     COURSE & MEDICAL DECISION MAKING  Pertinent Labs & Imaging studies reviewed. (See chart for details)    DDX:  Cluster HA, Migraine HA, dehydration, viral illness, electrolyte derangement, pregnancy    MDM    Initial evaluation at 1819:  Patient presents emergency room for symptoms as described above.  The patient is nontoxic, she does appear very anxious and states that she had struck her head earlier yesterday morning, she also was feeling anxious and developed a unilateral headache that was insidious in onset with no thunderclap, then developed into some bilateral frontal pressure sensations.  She has a mix of both cluster headache and atypical migraine symptoms and is reassuring that she has no focal neurological deficits, no signs of increased intracranial pressure at this time.  She had a headache duration greater than 24 hours and has no other evolving complaints and is not in extremis.  She has been afebrile, she has reassuring vital signs at this time I would doubt that this is representative  of a acute intracranial emergency.  This headache pathology is not consistent with intracranial masses and I had extensive discussion with her regarding the headache symptoms, the unilateral findings, and the lack of concerning features at this time.  I do believe it warrants administration of some IV fluids to help facilitate headache medications and she is amenable to trying this.    1915: Lab work showed no evidence of leukocytosis or anemia, no gross electrolyte abnormalities, she is not pregnant    Following administration of these medications, the patient is allowed to sleep and on reassessment she is feeling proved.  She would like to be discharged home at this time.  We had discussion regarding possible atypical migraine, cluster headache and caffeine withdrawal headaches.  She feels comfortable taking OTC medications and follow-up with her outpatient physician.  Understands that she develop fevers, neck discomfort, vision loss or any weakness or slurred speech she should return to the emergency department urgently as this would indicate a substantial increase in severity of symptoms of stroke would need to be assessed.    FINAL IMPRESSION  Visit Diagnoses     ICD-10-CM   1. Cluster headache, not intractable, unspecified chronicity pattern  G44.009     Electronically signed by: Balta Shell M.D., 3/31/2022 6:19 PM

## 2022-04-01 NOTE — ED NOTES
"Assumed care of pt-pt rounding, states she feels better and \"wants to leave\" update to erp. Same to bedside  "

## 2022-09-17 ENCOUNTER — HOSPITAL ENCOUNTER (EMERGENCY)
Facility: MEDICAL CENTER | Age: 32
End: 2022-09-17
Attending: EMERGENCY MEDICINE
Payer: MEDICAID

## 2022-09-17 ENCOUNTER — APPOINTMENT (OUTPATIENT)
Dept: RADIOLOGY | Facility: MEDICAL CENTER | Age: 32
End: 2022-09-17
Attending: EMERGENCY MEDICINE
Payer: MEDICAID

## 2022-09-17 VITALS
BODY MASS INDEX: 23.48 KG/M2 | DIASTOLIC BLOOD PRESSURE: 73 MMHG | RESPIRATION RATE: 16 BRPM | TEMPERATURE: 98 F | SYSTOLIC BLOOD PRESSURE: 116 MMHG | HEIGHT: 63 IN | WEIGHT: 132.5 LBS | HEART RATE: 71 BPM | OXYGEN SATURATION: 96 %

## 2022-09-17 DIAGNOSIS — S93.402A SPRAIN OF LEFT ANKLE, UNSPECIFIED LIGAMENT, INITIAL ENCOUNTER: ICD-10-CM

## 2022-09-17 DIAGNOSIS — S93.401A SPRAIN OF RIGHT ANKLE, UNSPECIFIED LIGAMENT, INITIAL ENCOUNTER: ICD-10-CM

## 2022-09-17 PROCEDURE — 73610 X-RAY EXAM OF ANKLE: CPT | Mod: RT

## 2022-09-17 PROCEDURE — 73630 X-RAY EXAM OF FOOT: CPT | Mod: RT

## 2022-09-17 PROCEDURE — 700102 HCHG RX REV CODE 250 W/ 637 OVERRIDE(OP): Performed by: EMERGENCY MEDICINE

## 2022-09-17 PROCEDURE — A9270 NON-COVERED ITEM OR SERVICE: HCPCS | Performed by: EMERGENCY MEDICINE

## 2022-09-17 PROCEDURE — 99284 EMERGENCY DEPT VISIT MOD MDM: CPT

## 2022-09-17 PROCEDURE — 73630 X-RAY EXAM OF FOOT: CPT | Mod: LT

## 2022-09-17 PROCEDURE — 73610 X-RAY EXAM OF ANKLE: CPT | Mod: LT

## 2022-09-17 RX ORDER — OXYCODONE HYDROCHLORIDE AND ACETAMINOPHEN 5; 325 MG/1; MG/1
1 TABLET ORAL ONCE
Status: COMPLETED | OUTPATIENT
Start: 2022-09-17 | End: 2022-09-17

## 2022-09-17 RX ORDER — IBUPROFEN 600 MG/1
600 TABLET ORAL ONCE
Status: COMPLETED | OUTPATIENT
Start: 2022-09-17 | End: 2022-09-17

## 2022-09-17 RX ADMIN — IBUPROFEN 600 MG: 600 TABLET ORAL at 20:34

## 2022-09-17 RX ADMIN — OXYCODONE AND ACETAMINOPHEN 1 TABLET: 5; 325 TABLET ORAL at 20:34

## 2022-09-17 ASSESSMENT — FIBROSIS 4 INDEX: FIB4 SCORE: 0.41

## 2022-09-18 NOTE — ED PROVIDER NOTES
"ED Provider Note    CHIEF COMPLAINT  Chief Complaint   Patient presents with    T-5000 Extremity Pain     Pt states she jumped down approx 5 feet yesterday at noon while hiking  C/O Bilat ankle pain and swelling       [facility] in [place]  Sol Jones is a 32 y.o. female who presents stating that she jumped approximately 5 to 6 feet off a rock yesterday at noon and she injured both of her ankles and feet.  Denies any spine pain, neurologic complaints.  Denies any hip or knee pain or proximal leg pain    REVIEW OF SYSTEMS  See HPI for further details. All other systems are negative.     PAST MEDICAL HISTORY  Past Medical History:   Diagnosis Date    ASTHMA     Ovarian cyst     Psychiatric disorder     anxiety       FAMILY HISTORY  Family History   Problem Relation Age of Onset    Hypertension Mother        SOCIAL HISTORY   reports that she has been smoking cigarettes. She has a 2.50 pack-year smoking history. She has never used smokeless tobacco. She reports current alcohol use. She reports that she does not use drugs.    SURGICAL HISTORY  Past Surgical History:   Procedure Laterality Date    GYN SURGERY      OTHER      L salpingectomy       CURRENT MEDICATIONS  Home Medications    **Home medications have not yet been reviewed for this encounter**         ALLERGIES  No Known Allergies    PHYSICAL EXAM  VITAL SIGNS: /73   Pulse 71   Temp 36.7 °C (98 °F) (Temporal)   Resp 16   Ht 1.6 m (5' 3\")   Wt 60.1 kg (132 lb 7.9 oz)   LMP 09/04/2022 (Approximate)   SpO2 96%   BMI 23.47 kg/m²    Constitutional: Well developed, Well nourished, No acute distress, Non-toxic appearance.   HENT: Normocephalic, Atraumatic, Bilateral external ears normal, Oropharynx is clear mucous membranes are moist. No oral exudates or nasal discharge.   Eyes: Pupils are equal round and reactive, EOMI, Conjunctiva normal, No discharge.   Neck: Normal range of motion, No tenderness, Supple, No stridor. No meningismus.  Lymphatic: No " lymphadenopathy noted.   Cardiovascular: Regular rate and rhythm without murmur rub or gallop.  Thorax & Lungs: Clear breath sounds bilaterally without wheezes, rhonchi or rales. There is no chest wall tenderness.   Abdomen: Soft non-tender non-distended. There is no rebound or guarding. No organomegaly is appreciated. Bowel sounds are normal.  Skin: Some discoloration of skin of the lateral left ankle and medial posterior arch of the right foot without rash.   Back: No CVA or spinal tenderness.   Extremities: Intact distal pulses, No edema, No tenderness, No cyanosis, No clubbing. Capillary refill is less than 2 seconds.  Musculoskeletal: Good range of motion in all major joints. No tenderness to palpation or major deformities noted.   Neurologic: Alert & oriented x 3, Normal motor function, Normal sensory function, No focal deficits noted. Reflexes are normal.  Psychiatric: Affect normal, Judgment normal, Mood normal. There is no suicidal ideation or patient reported hallucinations.       RADIOLOGY/PROCEDURES  DX-ANKLE 3+ VIEWS RIGHT   Final Result      Normal ankle series.      DX-FOOT-COMPLETE 3+ RIGHT   Final Result      No radiographic evidence of acute displaced fracture or subluxation.      DX-ANKLE 3+ VIEWS LEFT   Final Result      Normal ankle series.      DX-FOOT-COMPLETE 3+ LEFT   Final Result      No radiographic evidence of acute displaced fracture or subluxation.            COURSE & MEDICAL DECISION MAKING  Pertinent Labs & Imaging studies reviewed. (See chart for details)  I was concerned about the possibility of calcaneal versus ankle fractures in both feet and ankles and therefore ordered x-rays of both feet and both ankles which show no evidence of fracture but there is soft tissue swelling present.  Spine was nontender and I did not perform imaging of the spine  No other findings on exam that would suggest a trauma evaluation with CT    Patient responded well to oral pain medication and I  suggested using ibuprofen and/or Tylenol in the coming days for additional discomfort.  She will do ADLs and no more use crutches as needed to aid in ambulation and I placed her in a Velcro stirrup splints of both ankles as I believe she got bilateral sprains  Discharged in stable condition understands return precautions    FINAL IMPRESSION  1. Sprain of right ankle, unspecified ligament, initial encounter    2. Sprain of left ankle, unspecified ligament, initial encounter             Electronically signed by: Surinder Knox M.D., 9/17/2022 9:40 PM

## 2022-09-18 NOTE — ED NOTES
Air splints applied with + distal csm before and after application. Pt reported relief following application. Crutches adjusted to her height. Patient verbalized understanding of discharge instructions including not to drive or drink ETOH for 8 hrs, no questions at this time. VS stable, patient will ambulate to exit with d/c instructions in hand. Patients mother to drive home.

## 2023-07-19 ENCOUNTER — HOSPITAL ENCOUNTER (EMERGENCY)
Facility: MEDICAL CENTER | Age: 33
End: 2023-07-19
Attending: EMERGENCY MEDICINE
Payer: MEDICAID

## 2023-07-19 VITALS
WEIGHT: 133.16 LBS | HEART RATE: 62 BPM | HEIGHT: 64 IN | OXYGEN SATURATION: 98 % | RESPIRATION RATE: 16 BRPM | TEMPERATURE: 97.2 F | SYSTOLIC BLOOD PRESSURE: 102 MMHG | DIASTOLIC BLOOD PRESSURE: 46 MMHG | BODY MASS INDEX: 22.73 KG/M2

## 2023-07-19 DIAGNOSIS — M26.609 TMJ (TEMPOROMANDIBULAR JOINT SYNDROME): ICD-10-CM

## 2023-07-19 DIAGNOSIS — H66.90 ACUTE OTITIS MEDIA, UNSPECIFIED OTITIS MEDIA TYPE: ICD-10-CM

## 2023-07-19 PROCEDURE — A9270 NON-COVERED ITEM OR SERVICE: HCPCS | Performed by: EMERGENCY MEDICINE

## 2023-07-19 PROCEDURE — 700102 HCHG RX REV CODE 250 W/ 637 OVERRIDE(OP): Performed by: EMERGENCY MEDICINE

## 2023-07-19 PROCEDURE — 99282 EMERGENCY DEPT VISIT SF MDM: CPT

## 2023-07-19 RX ORDER — ACETAMINOPHEN 325 MG/1
650 TABLET ORAL ONCE
Status: COMPLETED | OUTPATIENT
Start: 2023-07-19 | End: 2023-07-19

## 2023-07-19 RX ORDER — TRIAMCINOLONE ACETONIDE 55 UG/1
2 SPRAY, METERED NASAL DAILY
Qty: 1 EACH | Refills: 0 | Status: SHIPPED | OUTPATIENT
Start: 2023-07-19 | End: 2023-11-17

## 2023-07-19 RX ORDER — AMOXICILLIN 500 MG/1
500 CAPSULE ORAL 3 TIMES DAILY
Qty: 21 CAPSULE | Refills: 0 | Status: ACTIVE | OUTPATIENT
Start: 2023-07-19 | End: 2023-07-26

## 2023-07-19 RX ORDER — NAPROXEN 500 MG/1
500 TABLET ORAL 2 TIMES DAILY WITH MEALS
Qty: 20 TABLET | Refills: 0 | Status: SHIPPED | OUTPATIENT
Start: 2023-07-19 | End: 2023-11-17

## 2023-07-19 RX ADMIN — ACETAMINOPHEN 650 MG: 325 TABLET ORAL at 13:07

## 2023-07-19 ASSESSMENT — FIBROSIS 4 INDEX: FIB4 SCORE: .4210526315789473684

## 2023-07-19 NOTE — ED TRIAGE NOTES
"Chief Complaint   Patient presents with    Jaw Pain     Pain is mostly on the left side   Pt states there feels like a clicking \"almost like I'm putting it back into place\"   Pt states that she is starting to get dizzy when she was looking up online all the things it could be      Pulse 76   Temp 36.6 °C (97.8 °F) (Temporal)   Resp 16   Ht 1.626 m (5' 4\")   Wt 60.4 kg (133 lb 2.5 oz)   SpO2 93%     PT denies any trauma or injury to the jaw that she can recall.   "

## 2023-07-19 NOTE — ED NOTES
Discharge instructions provided. Pt verbalized understanding of discharge instructions and to return to ER if condition worsens. Pt ambulated out of ER without difficulty.

## 2023-07-19 NOTE — ED PROVIDER NOTES
"  ER Provider Note    Scribed for Jean Hurtado M.d. by Romero Márquez. 7/19/2023  12:36 PM    Primary Care Provider: Lacy Jernigan D.O.    CHIEF COMPLAINT  Chief Complaint   Patient presents with    Jaw Pain     Pain is mostly on the left side   Pt states there feels like a clicking \"almost like I'm putting it back into place\"   Pt states that she is starting to get dizzy when she was looking up online all the things it could be      EXTERNAL RECORDS REVIEWED  Other none    HPI/ROS  LIMITATION TO HISTORY   Select: : None  OUTSIDE HISTORIAN(S):  Family member at bedside to confirm sequence of events and collateral information as detailed below.    Sol Jones is a 33 y.o. female who presents to the ED for evaluation of primarily left sided jaw pain onset earlier today. The patient states she also has left sided ear pain, resolved nosebleed, and difficulty balancing, but denies any dental pain, ear ringing sensation, shortness of breath, chest pain, or recent flu-like symptoms. She describes hitting her jaw against something today just prior to the onset of all of her symptoms. She states she has been hearing a popping or clicking noise in her right jaw for the past few months when opening or closing her mouth. She recently had all 4 wisdom teeth removed. She reports a history of frequent ear infections as a child and previously required tubes. Her tubes were removed when she was approximately 12.    PAST MEDICAL HISTORY  Past Medical History:   Diagnosis Date    ASTHMA     Ovarian cyst     Psychiatric disorder     anxiety     SURGICAL HISTORY  Past Surgical History:   Procedure Laterality Date    GYN SURGERY      OTHER      L salpingectomy     FAMILY HISTORY  Family History   Problem Relation Age of Onset    Hypertension Mother      SOCIAL HISTORY   reports that she has been smoking cigarettes. She has a 2.50 pack-year smoking history. She has never used smokeless tobacco. She reports that she does not " "currently use alcohol. She reports that she does not use drugs.    CURRENT MEDICATIONS  No current outpatient medications    ALLERGIES  Patient has no known allergies.    PHYSICAL EXAM  /64   Pulse 76   Temp 36.6 °C (97.8 °F) (Temporal)   Resp 16   Ht 1.626 m (5' 4\")   Wt 60.4 kg (133 lb 2.5 oz)   LMP 06/28/2023 (Approximate)   SpO2 93%   BMI 22.86 kg/m²   Constitutional: Well developed, Well nourished, No acute distress, Non-toxic appearance.   HENT: Normocephalic, Atraumatic, Tenderness at both TMJ's to palpation. Opens mouth to a couple fingers width. Posterior oropharynx normal. Left TM opacified. Right TM demonstrates light reflex, slightly dusky, no erythema. Septum on the right nare has evidence of recent nosebleed. no exudates or masses, nares patent.  Eyes: nonicteric  Neck: Supple, no meningismus  Lymphatic: No lymphadenopathy noted.   Cardiovascular: Regular rate and rhythm, no gallops rubs or murmurs  Lungs: Clear bilaterally   Skin: Warm, Dry, no rash  Genitalia: Deferred  Rectal: Deferred  Extremities: No edema  Neurologic: Alert, appropriate, follows commands, moving all extremities, normal speech. Neuro able to ambulate without assistance. No drift. No neglect. Finger to nose intact. Visual fields intact. Cranial nerves intact.  Psychiatric: Affect normal    COURSE & MEDICAL DECISION MAKING     ED Observation Status? No; Patient does not meet criteria for ED Observation.     INITIAL ASSESSMENT, COURSE AND PLAN  Care Narrative: This is a 33-year-old female with bilateral TMJ pain who has had some clicking with jaw movement.  She also notes some intermittent dizziness.  The patient has no chest pain or shortness of breath to suggest a cardiac or pulmonary cause.  I do not see any jaw swelling or see any obvious dental pain on exam to suggest infected tooth.  The patient does have a opacified left TM consistent with an otitis media.  She will be treated with antibiotics and decongestants.  " She also appears to had a recent nosebleed.  Patient be discharged to follow-up with her regular doctor in the next couple days for recheck.  At this time the patient has no focal neurologic deficits to suggest stroke-NIH stroke score is 0.    12:40 PM - Patient was evaluated at bedside. Explained that her symptoms are likely related to her left ear infection, and we will be prescribing antibiotics. The patient will be medicated with Tylenol 650 mg PO for her symptoms. I discussed plan for discharge and follow up as outlined below. The patient is stable for discharge at this time and will return for any new or worsening symptoms. Patient verbalizes understanding and support with my plan for discharge.      ADDITIONAL PROBLEM LIST      DISPOSITION AND DISCUSSIONS  I have discussed management of the patient with the following physicians and JUANITO's:  None    Discussion of management with other hospitals or appropriate source(s): None     Barriers to care at this time, including but not limited to:  None noted .     Decision tools and prescription drugs considered including, but not limited to: Antibiotics Amoxicillin and Medication modification Nasacort .    The patient is referred to a primary physician for blood pressure management, diabetic screening, and for all other preventative health concerns.    DISPOSITION:  Patient will be discharged home in stable condition.    FOLLOW UP:  Lacy Jernigan D.O.  [id number] [facility] [id number] [phone]    Schedule an appointment as soon as possible for a visit in 3 days        OUTPATIENT MEDICATIONS:  New Prescriptions    AMOXICILLIN (AMOXIL) 500 MG CAP    Take 1 Capsule by mouth 3 times a day for 7 days.    TRIAMCINOLONE (NASACORT) 55 MCG/ACT NASAL INHALER    Administer 2 Sprays into affected nostril(S) every day.     FINAL DIANGOSIS  1. TMJ (temporomandibular joint syndrome)    2. Acute otitis media, unspecified otitis media type         I, Romero Márquez (Len),  am scribing for, and in the presence of, Jean Hurtado M.D..    Electronically signed by: Romero Márquez (Scribe), 7/19/2023    IJean M.D. personally performed the services described in this documentation, as scribed by Romero Márquez in my presence, and it is both accurate and complete.     The note accurately reflects work and decisions made by me.  Jean Hurtado M.D.  7/19/2023  1:02 PM

## 2023-07-20 ENCOUNTER — HOSPITAL ENCOUNTER (EMERGENCY)
Facility: MEDICAL CENTER | Age: 33
End: 2023-07-20
Attending: STUDENT IN AN ORGANIZED HEALTH CARE EDUCATION/TRAINING PROGRAM
Payer: MEDICAID

## 2023-07-20 VITALS
BODY MASS INDEX: 22.73 KG/M2 | SYSTOLIC BLOOD PRESSURE: 124 MMHG | DIASTOLIC BLOOD PRESSURE: 66 MMHG | OXYGEN SATURATION: 98 % | TEMPERATURE: 97.4 F | WEIGHT: 133.16 LBS | RESPIRATION RATE: 18 BRPM | HEART RATE: 124 BPM | HEIGHT: 64 IN

## 2023-07-20 DIAGNOSIS — F41.9 ANXIETY: ICD-10-CM

## 2023-07-20 DIAGNOSIS — R51.9 RIGHT-SIDED FACE PAIN: ICD-10-CM

## 2023-07-20 DIAGNOSIS — H53.9 VISUAL DISTURBANCE: ICD-10-CM

## 2023-07-20 PROCEDURE — 700102 HCHG RX REV CODE 250 W/ 637 OVERRIDE(OP): Performed by: STUDENT IN AN ORGANIZED HEALTH CARE EDUCATION/TRAINING PROGRAM

## 2023-07-20 PROCEDURE — 99283 EMERGENCY DEPT VISIT LOW MDM: CPT

## 2023-07-20 PROCEDURE — A9270 NON-COVERED ITEM OR SERVICE: HCPCS | Performed by: STUDENT IN AN ORGANIZED HEALTH CARE EDUCATION/TRAINING PROGRAM

## 2023-07-20 RX ORDER — HYDROXYZINE HYDROCHLORIDE 25 MG/1
25 TABLET, FILM COATED ORAL ONCE
Status: COMPLETED | OUTPATIENT
Start: 2023-07-20 | End: 2023-07-20

## 2023-07-20 RX ADMIN — HYDROXYZINE HYDROCHLORIDE 25 MG: 25 TABLET, FILM COATED ORAL at 04:21

## 2023-07-20 ASSESSMENT — FIBROSIS 4 INDEX: FIB4 SCORE: .4210526315789473684

## 2023-07-20 NOTE — ED PROVIDER NOTES
"ED Provider Note    CHIEF COMPLAINT  Chief Complaint   Patient presents with    Blurred Vision     Patient states \"concerned for a cheek fracture\", endorses N/V and dizziness for a few hours. States that objects were blurry, and \"everything was spinning\"       EXTERNAL RECORDS REVIEWED  Patient seen in this ED 7/19/2020 3 in the afternoon for left-sided jaw pain, left-sided ear pain, resolved nosebleed.  Reported trauma to her jaw earlier in the day at that time.  No imaging done.  Has had some jaw popping for many years.    HPI/ROS  LIMITATION TO HISTORY   Select: Behavior    Sol Jones is a 33 y.o. female PMH anxiety who presents to the ED for the second time in 24 hours with complaints of face pain.  Patient was seen here earlier for similar complaints however that time reported the pain was on the left side instead of the right.  She is inconsistent with her history, stating to me that she \"hit it lightly\" she thinks may be on the wall earlier when she turned at home, but cannot provide a clear history.  She endorses both now and at the visit earlier today that she has been looking things up online and is concerned about a zygomatic fracture.  She reports she has had intermittent slightly blurred vision in the right eye on and off today and had dizziness earlier.  She also reports trauma to her face/head about a month and a half ago which again she cannot give me any clear history of what happened exactly but states she has had intermittent headaches since that time.  Patient states she intermittently wears glasses.    PAST MEDICAL HISTORY  Past Medical History:   Diagnosis Date    ASTHMA     Ovarian cyst     Psychiatric disorder     anxiety        SURGICAL HISTORY  Past Surgical History:   Procedure Laterality Date    GYN SURGERY      OTHER      L salpingectomy        FAMILY HISTORY  Family History   Problem Relation Age of Onset    Hypertension Mother        SOCIAL HISTORY       CURRENT " "MEDICATIONS  Home Medications    Medication Sig Taking? Last Dose Authorizing Provider   amoxicillin (AMOXIL) 500 MG Cap Take 1 Capsule by mouth 3 times a day for 7 days.   Jean Hurtado M.D.   triamcinolone (NASACORT) 55 MCG/ACT nasal inhaler Administer 2 Sprays into affected nostril(S) every day.   Jean Hurtado M.D.   naproxen (NAPROSYN) 500 MG Tab Take 1 Tablet by mouth 2 times a day with meals.   Jean Hurtado M.D.       ALLERGIES  No Known Allergies    PHYSICAL EXAM  /66   Pulse (!) 124   Temp 36.3 °C (97.4 °F) (Temporal)   Resp 18   Ht 1.626 m (5' 4\")   Wt 60.4 kg (133 lb 2.5 oz)   SpO2 98%   Constitutional: Alert, nontoxic, appears extremely anxious and fidgety  HENT: No signs of trauma, no bony tenderness of face, no swelling or asymmetry, patient able to bite down firmly on tongue depressor with both sides of her jaw.  Bilateral external ears normal, Nose normal.   Eyes: Pupils are equal and reactive, Conjunctiva normal, Non-icteric.  Extraocular movements intact  Visual Acuity:   R: 20/25  Left: 20/20  Both: 20/20  Neck: Normal range of motion, No tenderness, Supple, No stridor.   Cardiovascular: Regular rate and rhythm, no murmurs.   Thorax & Lungs: Normal breath sounds, No respiratory distress, No wheezing  Abdomen: Soft, No tenderness, No peritoneal signs, No masses, No pulsatile masses.   Skin: Warm, Dry, No erythema, No rash.   Extremities: Intact distal pulses, No edema, No tenderness, No cyanosis  Musculoskeletal: Good range of motion in all major joints. No tenderness to palpation or major deformities noted.   Neurologic: Alert , Normal motor function, Normal speech, No focal deficits noted.   Psychiatric: Very anxious, slightly pressured speech        COURSE & MEDICAL DECISION MAKING    ED Observation Status? No; Patient does not meet criteria for ED Observation.     INITIAL ASSESSMENT, COURSE AND PLAN  Care Narrative: 33-year-old female presenting to the emergency department with complaint of " right facial pain and intermittent blurred vision in the right eye.  She was here earlier for similar symptoms although at that time was reporting symptoms on the left side.  Her story of what happened potentially is very inconsistent and she cannot provide a clear history.  She has no tenderness on exam, no swelling, no physical signs of trauma and I am not concerned on her exam for a jaw or facial fracture.  There is no indication for imaging.  She has no signs of entrapment.  Visual acuity was performed with only a slight difference in vision on the right eye.  Patient reports she wears glasses and this certainly may be secondary to the fact that she does not have her glasses with her.  She has no other focal neuro deficits to make me suspicious for stroke.  Her behavior here demonstrates significant anxiety which I suspect is a significant component of her symptoms here tonight.  She has no tenderness over the temporal arteries to suspect temporal arteritis and is out of the age range of this complaint.  Patient is tachycardic here however she has had previous drug screens positive for amphetamines and multiple substances, her behavior here is suspicious for intoxication and meth use, this would certainly cause a tachycardia.  I am not concerned for sepsis.  Medicated with Atarax for anxiety prior to discharge.      ADDITIONAL PROBLEM LIST    Right-sided face pain   Intermittent vision changes  Anxiety    DISPOSITION AND DISCUSSIONS    Escalation of care considered, and ultimately not performed:diagnostic imaging    Decision tools and prescription drugs considered including, but not limited to:  Atarax for anxiety .    Discharged home in stable condition    FINAL DIAGNOSIS  1. Right-sided face pain        2. Visual disturbance        3. Anxiety              Electronically signed by: Gloria Parra M.D., 07/20/23 3:47 AM

## 2023-07-20 NOTE — ED TRIAGE NOTES
"Chief Complaint   Patient presents with    Blurred Vision     Patient states \"concerned for a cheek fracture\", endorses N/V and dizziness for a few hours. States that objects were blurry, and \"everything was spinning\"     /63   Pulse (!) 123   Temp 36.3 °C (97.4 °F) (Temporal)   Resp 18   Ht 1.626 m (5' 4\")   Wt 60.4 kg (133 lb 2.5 oz)   LMP 06/28/2023 (Approximate)   SpO2 96%   BMI 22.86 kg/m²     "

## 2023-07-20 NOTE — ED NOTES
Patient provided discharge instructions. Patient denies questions at this time. Patient ambulated out of ED independently with steady gait. No acute changes or concerns at this time.

## 2023-07-20 NOTE — DISCHARGE INSTRUCTIONS
Take the following medications for pain/fever at home:  Acetaminophen (Tylenol): Take 650 mg (2 regular strength) every 6 hours. Do not take more than 3,000mg in a 24 hour period.   Ibuprofen: Take 400-600 mg (2-3 regular strength) every 6 hours. Take with food.   Alternate the two medications and you can take one of them every 3 hours.       As we discussed you have no evidence of a fracture in your face and there is no indication for imaging at this time.  If you have headaches or soreness in your face from hitting it earlier you may take the medication above for pain.  We strongly recommend you talk to your primary care doctor about resources to help manage her anxiety.

## 2023-11-05 ENCOUNTER — HOSPITAL ENCOUNTER (EMERGENCY)
Facility: MEDICAL CENTER | Age: 33
End: 2023-11-05
Attending: EMERGENCY MEDICINE
Payer: MEDICAID

## 2023-11-05 ENCOUNTER — APPOINTMENT (OUTPATIENT)
Dept: RADIOLOGY | Facility: MEDICAL CENTER | Age: 33
End: 2023-11-05
Attending: EMERGENCY MEDICINE
Payer: MEDICAID

## 2023-11-05 VITALS
RESPIRATION RATE: 18 BRPM | TEMPERATURE: 97.5 F | OXYGEN SATURATION: 99 % | SYSTOLIC BLOOD PRESSURE: 100 MMHG | DIASTOLIC BLOOD PRESSURE: 62 MMHG | HEART RATE: 72 BPM | BODY MASS INDEX: 21.52 KG/M2 | WEIGHT: 125.4 LBS

## 2023-11-05 DIAGNOSIS — J45.21 INTERMITTENT ASTHMA WITH ACUTE EXACERBATION, UNSPECIFIED ASTHMA SEVERITY: ICD-10-CM

## 2023-11-05 PROCEDURE — 71045 X-RAY EXAM CHEST 1 VIEW: CPT

## 2023-11-05 PROCEDURE — 94640 AIRWAY INHALATION TREATMENT: CPT

## 2023-11-05 PROCEDURE — 700101 HCHG RX REV CODE 250: Performed by: EMERGENCY MEDICINE

## 2023-11-05 PROCEDURE — 99285 EMERGENCY DEPT VISIT HI MDM: CPT

## 2023-11-05 PROCEDURE — 700111 HCHG RX REV CODE 636 W/ 250 OVERRIDE (IP): Performed by: EMERGENCY MEDICINE

## 2023-11-05 RX ORDER — ALBUTEROL SULFATE 2.5 MG/3ML
2.5 SOLUTION RESPIRATORY (INHALATION) EVERY 4 HOURS PRN
Qty: 30 EACH | Refills: 0 | Status: SHIPPED | OUTPATIENT
Start: 2023-11-05 | End: 2023-11-17

## 2023-11-05 RX ORDER — IPRATROPIUM BROMIDE AND ALBUTEROL SULFATE 2.5; .5 MG/3ML; MG/3ML
3 SOLUTION RESPIRATORY (INHALATION)
Status: COMPLETED | OUTPATIENT
Start: 2023-11-05 | End: 2023-11-05

## 2023-11-05 RX ORDER — PREDNISONE 50 MG/1
50 TABLET ORAL DAILY
Qty: 5 TABLET | Refills: 0 | Status: SHIPPED | OUTPATIENT
Start: 2023-11-05 | End: 2023-11-10

## 2023-11-05 RX ORDER — PREDNISONE 10 MG/1
40 TABLET ORAL ONCE
Status: COMPLETED | OUTPATIENT
Start: 2023-11-05 | End: 2023-11-05

## 2023-11-05 RX ORDER — ALBUTEROL SULFATE 90 UG/1
1-2 AEROSOL, METERED RESPIRATORY (INHALATION) EVERY 4 HOURS PRN
Qty: 1 EACH | Refills: 1 | Status: SHIPPED | OUTPATIENT
Start: 2023-11-05 | End: 2023-11-17

## 2023-11-05 RX ADMIN — IPRATROPIUM BROMIDE AND ALBUTEROL SULFATE 3 ML: .5; 3 SOLUTION RESPIRATORY (INHALATION) at 19:29

## 2023-11-05 RX ADMIN — PREDNISONE 40 MG: 10 TABLET ORAL at 19:29

## 2023-11-05 RX ADMIN — ALBUTEROL SULFATE 2.5 MG: 2.5 SOLUTION RESPIRATORY (INHALATION) at 20:40

## 2023-11-05 ASSESSMENT — FIBROSIS 4 INDEX: FIB4 SCORE: .4210526315789473684

## 2023-11-06 NOTE — ED TRIAGE NOTES
Chief Complaint   Patient presents with    Shortness of Breath      Pt walked in using her albuterol nebulizer. Pt complains of SOB, no wheezing noted. Pt has increased work of breathing.

## 2023-11-06 NOTE — ED PROVIDER NOTES
ED Provider Note    CHIEF COMPLAINT  Chief Complaint   Patient presents with    Shortness of Breath       EXTERNAL RECORDS REVIEWED  Inpatient Notes reviewed discharge summary by SERA Chau admitted September 7, 2020.  Patient admitted for CL adenitis on the sixth of September.  Started on IV clindamycin, clinically improved, no evidence of abscess.  Able to be discharged on the seventh.    HPI/ROS  LIMITATION TO HISTORY   Select: : None  OUTSIDE HISTORIAN(S):  Parent notes patient does not follow pulmonology    Soleuegnia Jones is a 33 y.o. female who presents for acute dyspnea.  Patient relates history of asthma.  She has inhaler at home and has been borrowing her nephews nebulizer today.  She relates dyspnea for the last week, she had worsening wheezing and worsening shortness of breath for the last 2 hours.  She notes no fever, no medication changes, does endorse she is working in her garage more for the last week however and has been around dust.  No significant sore throat, does note runny nose.  Does not follow with pulmonology.    PAST MEDICAL HISTORY   has a past medical history of ASTHMA, Ovarian cyst, and Psychiatric disorder.    SURGICAL HISTORY   has a past surgical history that includes gyn surgery and other.    FAMILY HISTORY  Family History   Problem Relation Age of Onset    Hypertension Mother        SOCIAL HISTORY  Social History     Tobacco Use    Smoking status: Every Day     Current packs/day: 0.50     Average packs/day: 0.5 packs/day for 5.0 years (2.5 ttl pk-yrs)     Types: Cigarettes    Smokeless tobacco: Never   Vaping Use    Vaping Use: Former   Substance and Sexual Activity    Alcohol use: Not Currently     Comment: very rare    Drug use: No    Sexual activity: Not on file       CURRENT MEDICATIONS  Home Medications    **Home medications have not yet been reviewed for this encounter**         ALLERGIES  No Known Allergies    PHYSICAL EXAM  VITAL SIGNS: /62   Pulse 72   Temp  36.4 °C (97.5 °F) (Temporal)   Resp 18   Wt 56.9 kg (125 lb 6.4 oz)   SpO2 99%   BMI 21.52 kg/m²    General: Alert, mild acute distress  Skin: Warm, dry, normal for ethnicity  Head: Normocephalic, atraumatic  Neck: Trachea midline  Eye: PERRL, normal conjunctiva  ENMT: Oral mucosa moist, no pharyngeal erythema or exudate.  Clear rhinorrhea noted.  Cardiovascular: Regular rate and rhythm, No murmur, Normal peripheral perfusion  Respiratory: Lungs demonstrate mild inspiratory and expiratory wheezing in all lung fields, no Rales, rhonchi, no crackles, respirations are mildly labored and tachypneic but she is able to speak in full sentences, breath sounds are equal, supracostal accessory muscle use but no retractionsed  Musculoskeletal: No swelling, no deformity  Neurological: Alert and oriented to person, place, time, and situation  Lymphatics: No lymphadenopathy  Psychiatric: Cooperative, mildly anxious, otherwise appropriate mood & affect       RADIOLOGY  I have independently interpreted the diagnostic imaging associated with this visit and am waiting the final reading from the radiologist.   My preliminary interpretation is as follows: No lobar infiltrate  Radiologist interpretation:   DX-CHEST-PORTABLE (1 VIEW)   Final Result         1. No acute cardiopulmonary abnormalities are identified.           COURSE & MEDICAL DECISION MAKING    ED Observation Status? Yes; I am placing the patient in to an observation status due to a diagnostic uncertainty as well as therapeutic intensity. Patient placed in observation status at 7:24 PM, 11/5/2023.     Observation plan is as follows: Patient medicated with prednisone 4 mg IV, nebulized DuoNeb as well.  Chest x-ray be obtained.  Differential diagnosis at this point includes was not restricted to asthma exacerbation, allergic asthma, allergic rhinitis, pneumonia, bronchitis    2015: Patient reassessed, oxygen saturation reassuring at 91 to 93%.  She is feeling better.   Repeat pulmonary exam demonstrates much improved air movement but she is still having inspiratory wheezing.  Given oxygen saturation low/normal and given she is still actively wheezing I do feel she would benefit from a second treatment.  Ordered albuterol nebulizer at this time.  Updated with reassuring x-ray.    2120: Patient doing better after second nebulizer treatment, oxygen saturation is now 99%.    Patient Vitals for the past 24 hrs:   BP Temp Temp src Pulse Resp SpO2 Weight   11/05/23 2126 100/62 36.4 °C (97.5 °F) Temporal 72 18 99 % --   11/05/23 2111 -- -- -- 66 -- 93 % --   11/05/23 2040 -- -- -- 73 (!) 53 95 % --   11/05/23 1948 94/65 -- -- 87 18 91 % --   11/05/23 1929 -- -- -- 86 (!) 30 94 % --   11/05/23 1907 103/76 36.4 °C (97.6 °F) -- 72 (!) 24 96 % 56.9 kg (125 lb 6.4 oz)        Upon Reevaluation, the patient's condition has: Improved; and will be discharged.    Patient discharged from ED Observation status at 2121 (Time) 11/5/23 (Date).     INITIAL ASSESSMENT, COURSE AND PLAN  Care Narrative: This patient is a 33-year-old female with history of asthma who presents for evaluation of acute dyspnea.  She has significant wheezing and is tachypneic on initial exam, thankfully doing much better after steroids, DuoNeb, and albuterol nebulizer treatment.  Given she notes dyspnea for a week preceding symptoms certainly there is clear indication for imaging.  X-ray thankfully does not demonstrate evidence of organized infiltrate nor effusion.  She does note some potential allergic exposures as she has been working in a garage around lots of dust.  I suspect likely this is the etiology of her asthma exacerbation.  I will write her for steroids, as well as appropriate inhaled beta agonist for symptomatic relief.  Given she is in her 30s and has history of asthma since childhood I do feel she would benefit from following up with pulmonology as she is never done so.  Appropriate referral is thusly indicated  and initiated here from the ED.        ADDITIONAL PROBLEM LIST  Cough, wheeze, dyspnea  DISPOSITION AND DISCUSSIONS  I have discussed management of the patient with the following physicians and JUANITO's:  NA    Discussion of management with other [facility] or appropriate source(s): RT concurs with the story wheezing, patient doing better after second dose      Escalation of care considered, and ultimately not performed:NA    Barriers to care at this time, including but not limited to:  NA .     Decision tools and prescription drugs considered including, but not limited to:  NA .    The patient will return for new or worsening symptoms and is stable at the time of discharge.    DISPOSITION:  Patient will be discharged home in stable condition.    FOLLOW UP:  Lacy Jernigan D.O.  [id number] [facility] [id number] [phone]    Schedule an appointment as soon as possible for a visit         OUTPATIENT MEDICATIONS:  Discharge Medication List as of 11/5/2023  9:27 PM        START taking these medications    Details   predniSONE (DELTASONE) 50 MG Tab Take 1 Tablet by mouth every day for 5 days., Disp-5 Tablet, R-0, Normal      albuterol 108 (90 Base) MCG/ACT Aero Soln inhalation aerosol Inhale 1-2 Puffs every four hours as needed for Shortness of Breath., Disp-1 Each, R-1, Normal      albuterol (PROVENTIL) 2.5mg/3ml Nebu Soln solution for nebulization Take 3 mL by nebulization every four hours as needed for Shortness of Breath., Disp-30 Each, R-0, Normal                FINAL DIAGNOSIS  1. Intermittent asthma with acute exacerbation, unspecified asthma severity           Electronically signed by: Jean Matta M.D., 11/5/2023 7:16 PM

## 2023-11-15 ENCOUNTER — APPOINTMENT (OUTPATIENT)
Dept: RADIOLOGY | Facility: MEDICAL CENTER | Age: 33
End: 2023-11-15
Attending: STUDENT IN AN ORGANIZED HEALTH CARE EDUCATION/TRAINING PROGRAM
Payer: MEDICAID

## 2023-11-15 ENCOUNTER — HOSPITAL ENCOUNTER (EMERGENCY)
Facility: MEDICAL CENTER | Age: 33
End: 2023-11-15
Attending: STUDENT IN AN ORGANIZED HEALTH CARE EDUCATION/TRAINING PROGRAM
Payer: MEDICAID

## 2023-11-15 VITALS
SYSTOLIC BLOOD PRESSURE: 128 MMHG | DIASTOLIC BLOOD PRESSURE: 75 MMHG | TEMPERATURE: 98.2 F | HEART RATE: 80 BPM | OXYGEN SATURATION: 100 % | RESPIRATION RATE: 20 BRPM

## 2023-11-15 DIAGNOSIS — J45.901 ASTHMA WITH ACUTE EXACERBATION, UNSPECIFIED ASTHMA SEVERITY, UNSPECIFIED WHETHER PERSISTENT: ICD-10-CM

## 2023-11-15 DIAGNOSIS — T78.40XA ALLERGIC REACTION, INITIAL ENCOUNTER: ICD-10-CM

## 2023-11-15 PROCEDURE — 96375 TX/PRO/DX INJ NEW DRUG ADDON: CPT

## 2023-11-15 PROCEDURE — 71045 X-RAY EXAM CHEST 1 VIEW: CPT

## 2023-11-15 PROCEDURE — 99285 EMERGENCY DEPT VISIT HI MDM: CPT

## 2023-11-15 PROCEDURE — 36415 COLL VENOUS BLD VENIPUNCTURE: CPT

## 2023-11-15 PROCEDURE — 700101 HCHG RX REV CODE 250: Performed by: STUDENT IN AN ORGANIZED HEALTH CARE EDUCATION/TRAINING PROGRAM

## 2023-11-15 PROCEDURE — 94760 N-INVAS EAR/PLS OXIMETRY 1: CPT

## 2023-11-15 PROCEDURE — 700111 HCHG RX REV CODE 636 W/ 250 OVERRIDE (IP): Performed by: STUDENT IN AN ORGANIZED HEALTH CARE EDUCATION/TRAINING PROGRAM

## 2023-11-15 PROCEDURE — 96365 THER/PROPH/DIAG IV INF INIT: CPT

## 2023-11-15 PROCEDURE — 94644 CONT INHLJ TX 1ST HOUR: CPT

## 2023-11-15 RX ORDER — ALBUTEROL SULFATE 90 UG/1
2 AEROSOL, METERED RESPIRATORY (INHALATION) EVERY 6 HOURS PRN
Qty: 8.5 G | Refills: 0 | Status: SHIPPED | OUTPATIENT
Start: 2023-11-15 | End: 2024-01-26 | Stop reason: SDUPTHER

## 2023-11-15 RX ORDER — PREDNISONE 50 MG/1
50 TABLET ORAL DAILY
Qty: 4 TABLET | Refills: 0 | Status: SHIPPED | OUTPATIENT
Start: 2023-11-15 | End: 2023-12-03

## 2023-11-15 RX ORDER — ALBUTEROL SULFATE 2.5 MG/3ML
2.5 SOLUTION RESPIRATORY (INHALATION) EVERY 4 HOURS PRN
Qty: 30 EACH | Refills: 0 | Status: ON HOLD | OUTPATIENT
Start: 2023-11-15 | End: 2024-01-08

## 2023-11-15 RX ORDER — METHYLPREDNISOLONE SODIUM SUCCINATE 125 MG/2ML
125 INJECTION, POWDER, LYOPHILIZED, FOR SOLUTION INTRAMUSCULAR; INTRAVENOUS ONCE
Status: COMPLETED | OUTPATIENT
Start: 2023-11-15 | End: 2023-11-15

## 2023-11-15 RX ORDER — DIPHENHYDRAMINE HYDROCHLORIDE 50 MG/ML
25 INJECTION INTRAMUSCULAR; INTRAVENOUS ONCE
Status: COMPLETED | OUTPATIENT
Start: 2023-11-15 | End: 2023-11-15

## 2023-11-15 RX ORDER — MAGNESIUM SULFATE 1 G/100ML
1 INJECTION INTRAVENOUS ONCE
Status: COMPLETED | OUTPATIENT
Start: 2023-11-15 | End: 2023-11-15

## 2023-11-15 RX ADMIN — METHYLPREDNISOLONE SODIUM SUCCINATE 125 MG: 125 INJECTION, POWDER, FOR SOLUTION INTRAMUSCULAR; INTRAVENOUS at 01:54

## 2023-11-15 RX ADMIN — MAGNESIUM SULFATE IN DEXTROSE 1 G: 10 INJECTION, SOLUTION INTRAVENOUS at 01:54

## 2023-11-15 RX ADMIN — DIPHENHYDRAMINE HYDROCHLORIDE 25 MG: 50 INJECTION, SOLUTION INTRAMUSCULAR; INTRAVENOUS at 01:55

## 2023-11-15 RX ADMIN — IPRATROPIUM BROMIDE 0.5 MG: 0.5 SOLUTION RESPIRATORY (INHALATION) at 01:54

## 2023-11-15 RX ADMIN — ALBUTEROL SULFATE 10 MG: 2.5 SOLUTION RESPIRATORY (INHALATION) at 01:54

## 2023-11-15 NOTE — ED TRIAGE NOTES
Chief Complaint   Patient presents with    Shortness of Breath    Allergic Reaction     Pt reports she had some stuff in her garage which she inhaled last night around 11pm,  pt instantly developed difficulty breathing, rashes     /40   Pulse 99   Resp (!) 32   LMP 11/01/2023 (Approximate)   SpO2 91%

## 2023-11-15 NOTE — ED PROVIDER NOTES
CHIEF COMPLAINT  Chief Complaint   Patient presents with    Shortness of Breath    Allergic Reaction     Pt reports she had some stuff in her garage which she inhaled last night around 11pm,  pt instantly developed difficulty breathing, rashes       LIMITATION TO HISTORY   Select: None     HPI    Sol Jones is a 33 y.o. female who presents to the Emergency Department evaluation of shortness of breath and wheezing.  Patient does have a history of asthma.  States that she was working on a garage when she was exposed to some potential allergens,  which caused her to feel wheezy and experience shortness of breath also noted that she began to develop a rash around her lips.  Denies any lip or tongue swelling, no known history of anaphylaxis.  Has never been intubated or hospitalized for her asthma.    OUTSIDE HISTORIAN(S):  Select: Chest x-ray 11/5/2023 showed no acute pathology    EXTERNAL RECORDS REVIEWED  Select: Other mother reports that the patient has a history of asthma      PAST MEDICAL HISTORY  Past Medical History:   Diagnosis Date    ASTHMA     Ovarian cyst     Psychiatric disorder     anxiety     .    SURGICAL HISTORY  Past Surgical History:   Procedure Laterality Date    GYN SURGERY      OTHER      L salpingectomy         FAMILY HISTORY  Family History   Problem Relation Age of Onset    Hypertension Mother           SOCIAL HISTORY  Social History     Socioeconomic History    Marital status: Single     Spouse name: Not on file    Number of children: Not on file    Years of education: Not on file    Highest education level: Not on file   Occupational History    Not on file   Tobacco Use    Smoking status: Every Day     Current packs/day: 0.50     Average packs/day: 0.5 packs/day for 5.0 years (2.5 ttl pk-yrs)     Types: Cigarettes    Smokeless tobacco: Never   Vaping Use    Vaping Use: Former   Substance and Sexual Activity    Alcohol use: Not Currently     Comment: very rare    Drug use: Not  Currently     Types: Inhaled     Comment: marijuana    Sexual activity: Not on file   Other Topics Concern    Not on file   Social History Narrative    Not on file     Social Determinants of Health     Financial Resource Strain: Not on file   Food Insecurity: Not on file   Transportation Needs: Not on file   Physical Activity: Not on file   Stress: Not on file   Social Connections: Not on file   Intimate Partner Violence: Not on file   Housing Stability: Not on file         CURRENT MEDICATIONS  No current facility-administered medications on file prior to encounter.     Current Outpatient Medications on File Prior to Encounter   Medication Sig Dispense Refill    albuterol 108 (90 Base) MCG/ACT Aero Soln inhalation aerosol Inhale 1-2 Puffs every four hours as needed for Shortness of Breath. 1 Each 1    albuterol (PROVENTIL) 2.5mg/3ml Nebu Soln solution for nebulization Take 3 mL by nebulization every four hours as needed for Shortness of Breath. 30 Each 0    triamcinolone (NASACORT) 55 MCG/ACT nasal inhaler Administer 2 Sprays into affected nostril(S) every day. 1 Each 0    naproxen (NAPROSYN) 500 MG Tab Take 1 Tablet by mouth 2 times a day with meals. 20 Tablet 0           ALLERGIES  No Known Allergies    PHYSICAL EXAM  VITAL SIGNS:/40   Pulse 98   Temp 36.8 °C (98.2 °F) (Temporal)   Resp (!) 24   LMP 11/01/2023 (Approximate)   SpO2 96%       VITALS - vital signs documented prior to this note have been reviewed and noted,  GENERAL - awake, alert, oriented, GCS 15, no apparent distress, non-toxic  appearing  HEENT - normocephalic, atraumatic, pupils equal, sclera anicteric, mucus  membranes moist  NECK - supple, no meningismus, full active range of motion, trachea midline  CARDIOVASCULAR - regular rate/rhythm, no murmurs/gallops/rubs  PULMONARY -tachypneic diffuse expiratory wheezing speaking 2-3 word sentences  GASTROINTESTINAL - soft, non-tender, non-distended, no rebound, guarding,  or  peritonitis  GENITOURINARY - Deferred  NEUROLOGIC - Awake alert, normal mental status, speech fluid, cognition  normal, moves all extremities  MUSCULOSKELETAL - no obvious asymmetry or deformities present  EXTREMITIES - warm, well-perfused, no cyanosis or significant edema  DERMATOLOGIC - warm, dry, no rashes, no jaundice  PSYCHIATRIC - normal affect, normal insight, normal concentration    DIAGNOSTIC STUDIES / PROCEDURES    RADIOLOGY  I have independently interpreted the diagnostic imaging associated with this visit and am waiting the final reading from the radiologist.   My preliminary interpretation is as follows: No pneumonia      Radiologist interpretation:   DX-CHEST-PORTABLE (1 VIEW)   Final Result      No acute cardiopulmonary disease evident.           COURSE & MEDICAL DECISION MAKING    ED COURSE:    ED Observation Status? no    INTERVENTIONS BY ME:  Medications   albuterol (Proventil) 2.5mg/0.5ml nebulizer solution 10 mg (10 mg Nebulization Given 11/15/23 0154)   magnesium sulfate in D5W IVPB premix 1 g (0 g Intravenous Stopped 11/15/23 0220)   methylPREDNISolone sod succ (SOLU-MEDROL) 125 MG injection 125 mg (125 mg Intravenous Given 11/15/23 0154)   ipratropium (Atrovent) 0.02 % nebulizer solution 0.5 mg (0.5 mg Nebulization Given 11/15/23 0154)   diphenhydrAMINE (Benadryl) injection 25 mg (25 mg Intravenous Given 11/15/23 0155)         Critical care    Critical Care Procedure Note    Total critical care time: Approximately 36 minutes    Upon my assess due to a high probability of clinically significant, life threatening deterioration, secondary to acute hypoxic respiratory failure in the setting of an asthma exacerbation the patient required my direct attention and intervention. This critical care time included obtaining a history; examining the patient; pulse oximetry; ordering and review of studies; arranging urgent treatment with development of a management plan; evaluation of patient's response to  treatment; frequent reassessment; and, discussions with other providers.    was exclusive of separately billable procedures and treating other patients and teaching time.  INITIAL ASSESSMENT, COURSE AND PLAN  Care Narrative: Patient presented for evaluation of shortness of breath and wheezing, after being exposed to possible allergen in her garage.  She stated she did develop a rash around her face, with associated shortness of breath and wheezing.  Does have a history of asthma.  Was seen approximately a week ago for similar complaint and improved after breathing treatment and steroids.  Upon arrival the patient was noted to be hypoxic with a room air saturation of 88% she was placed on supplemental oxygen and hour-long breathing treatment was ordered, magnesium and steroids were ordered, given her rash and potential allergen exposure and her was treated with a dose of Benadryl.  After hour-long breathing treatment patient's wheezing had completely resolved she had no residual respiratory distress or hypoxia chest x-ray showed no evidence of pneumonia.  Patient physical exam seems consistent with an acute asthma exacerbation possible due to allergen exposure.  Will discharge patient with steroids and refills of her albuterol instruct her to follow-up with her PCP, and also has outpatient pulmonary referral placed by previous ED provider encouraged her to follow-up with pulmonology return precautions discussed discharge stable condition             ADDITIONAL PROBLEM LIST  Tobacco use disorder FRANKLIN Young* spent greater than 3 minutes with the patient explaining the importance of smoking cessation.    DISPOSITION AND DISCUSSIONS      Escalation of care considered, and ultimately not performed:acute inpatient care management, however at this time, the patient is most appropriate for outpatient management consider admission given that the patient was mildly hypoxic on room air and had a moderate to severe  asthma exacerbation however her wheezing, hypoxic respiratory failure resolved after treatment thus she is stable for discharge      Decision tools and prescription drugs considered including, but not limited to:  Steroids .    FINAL DIAGNOSIS  1. Asthma with acute exacerbation, unspecified asthma severity, unspecified whether persistent    2. Allergic reaction, initial encounter    #3 tobacco use disorder         Electronically signed by: Tez Ortiz DO ,3:04 AM 11/15/23

## 2023-11-17 ENCOUNTER — HOSPITAL ENCOUNTER (EMERGENCY)
Facility: MEDICAL CENTER | Age: 33
End: 2023-11-17
Attending: EMERGENCY MEDICINE
Payer: MEDICAID

## 2023-11-17 VITALS
DIASTOLIC BLOOD PRESSURE: 62 MMHG | RESPIRATION RATE: 18 BRPM | OXYGEN SATURATION: 98 % | HEIGHT: 64 IN | BODY MASS INDEX: 21.45 KG/M2 | TEMPERATURE: 98.1 F | SYSTOLIC BLOOD PRESSURE: 100 MMHG | HEART RATE: 78 BPM | WEIGHT: 125.66 LBS

## 2023-11-17 DIAGNOSIS — J45.41 MODERATE PERSISTENT ASTHMA WITH ACUTE EXACERBATION: ICD-10-CM

## 2023-11-17 PROCEDURE — 700101 HCHG RX REV CODE 250

## 2023-11-17 PROCEDURE — 99283 EMERGENCY DEPT VISIT LOW MDM: CPT

## 2023-11-17 PROCEDURE — 94640 AIRWAY INHALATION TREATMENT: CPT

## 2023-11-17 PROCEDURE — 94760 N-INVAS EAR/PLS OXIMETRY 1: CPT

## 2023-11-17 RX ORDER — IPRATROPIUM BROMIDE AND ALBUTEROL SULFATE 2.5; .5 MG/3ML; MG/3ML
3 SOLUTION RESPIRATORY (INHALATION)
Status: DISCONTINUED | OUTPATIENT
Start: 2023-11-17 | End: 2023-11-17 | Stop reason: HOSPADM

## 2023-11-17 RX ORDER — LAMOTRIGINE 200 MG/1
400 TABLET ORAL
Status: ON HOLD | COMMUNITY
End: 2024-01-09

## 2023-11-17 RX ORDER — IPRATROPIUM BROMIDE AND ALBUTEROL SULFATE 2.5; .5 MG/3ML; MG/3ML
SOLUTION RESPIRATORY (INHALATION)
Status: COMPLETED
Start: 2023-11-17 | End: 2023-11-17

## 2023-11-17 RX ORDER — IPRATROPIUM BROMIDE AND ALBUTEROL SULFATE 2.5; .5 MG/3ML; MG/3ML
3 SOLUTION RESPIRATORY (INHALATION) EVERY 4 HOURS PRN
Qty: 30 EACH | Refills: 1 | Status: ON HOLD | OUTPATIENT
Start: 2023-11-17 | End: 2024-01-08

## 2023-11-17 RX ORDER — PROPRANOLOL HYDROCHLORIDE 40 MG/1
40 TABLET ORAL 3 TIMES DAILY
Status: ON HOLD | COMMUNITY
End: 2024-01-09

## 2023-11-17 RX ORDER — BUSPIRONE HYDROCHLORIDE 30 MG/1
30 TABLET ORAL DAILY
Status: ON HOLD | COMMUNITY
End: 2024-01-09

## 2023-11-17 RX ADMIN — IPRATROPIUM BROMIDE AND ALBUTEROL SULFATE 3 ML: .5; 3 SOLUTION RESPIRATORY (INHALATION) at 16:00

## 2023-11-17 ASSESSMENT — FIBROSIS 4 INDEX: FIB4 SCORE: .4210526315789473684

## 2023-11-17 NOTE — ED TRIAGE NOTES
Chief Complaint   Patient presents with    Asthma     Reports asthma hx, states no relief from albuterol. LS tight and wheezes bilat throughout.      Charge RN aware of pt need for room at this time.

## 2023-11-17 NOTE — ED PROVIDER NOTES
ED Provider Note    CHIEF COMPLAINT  Chief Complaint   Patient presents with    Asthma     Reports asthma hx, states no relief from albuterol. LS tight and wheezes bilat throughout.        EXTERNAL RECORDS REVIEWED  Other reviewed emergency department note dated November 15, patient seen for allergic reaction by Dr. Ortiz.  Initially mildly hypoxic but this improved and she was able to be discharged.    HPI/ROS  LIMITATION TO HISTORY   Select: : None  OUTSIDE HISTORIAN(S):  Family notes patient also took diphenhydramine today    Sol Jones is a 33 y.o. female who presents for evaluation of acute dyspnea.  Patient diagnosed the 15th with allergic reaction.  She has asthma and at that time was seen for wheezing and dyspnea after she was exposed to dust in her garage.  Patient relates she was started on steroids.  She does have an albuterol inhaler.  She is inhaler twice a day with only mild improvement.  She relates she forgot to take her steroid in the morning but did take it this afternoon at about noon.  Starting around noon she was again working in her garage, she notes dyspnea reoccurred.  She is not wearing any sort of mask.  No fever, no vomiting.  Given still out of breath despite her albuterol inhaler she came to be reassessed.  She also took diphenhydramine no improvement.    PAST MEDICAL HISTORY   has a past medical history of ASTHMA, Ovarian cyst, and Psychiatric disorder.    SURGICAL HISTORY   has a past surgical history that includes gyn surgery and other.    FAMILY HISTORY  Family History   Problem Relation Age of Onset    Hypertension Mother        SOCIAL HISTORY  Social History     Tobacco Use    Smoking status: Some Days     Current packs/day: 0.50     Average packs/day: 0.5 packs/day for 5.0 years (2.5 ttl pk-yrs)     Types: Cigarettes    Smokeless tobacco: Never   Vaping Use    Vaping Use: Former   Substance and Sexual Activity    Alcohol use: Not Currently     Comment: very rare     "Drug use: Not Currently     Types: Inhaled     Comment: marijuana    Sexual activity: Not on file       CURRENT MEDICATIONS  Home Medications       Reviewed by Dulce Roberto (Pharmacy Tech) on 11/17/23 at 1626  Med List Status: Complete     Medication Last Dose Status   albuterol (PROVENTIL) 2.5mg/3ml Nebu Soln solution for nebulization 11/17/2023 Active   albuterol 108 (90 Base) MCG/ACT Aero Soln inhalation aerosol 11/17/2023 Active   busPIRone (BUSPAR) 30 MG tablet 11/17/2023 Active   lamotrigine (LAMICTAL) 200 MG tablet 11/17/2023 Active   predniSONE (DELTASONE) 50 MG Tab 11/17/2023 Active   propranolol (INDERAL) 40 MG Tab 11/17/2023 Active                    ALLERGIES  No Known Allergies    PHYSICAL EXAM  VITAL SIGNS: /62   Pulse 78   Temp 36.7 °C (98.1 °F) (Temporal)   Resp 18   Ht 1.626 m (5' 4\")   Wt 57 kg (125 lb 10.6 oz)   LMP 11/01/2023 (Approximate)   SpO2 98%   BMI 21.57 kg/m²    General: Alert, no acute distress  Skin: Warm, dry, normal for ethnicity  Head: Normocephalic, atraumatic  Neck: Trachea midline  Eye: PERRL, normal conjunctiva  ENMT: Oral mucosa moist  Cardiovascular: Regular rate and rhythm, No murmur, Normal peripheral perfusion  Respiratory: Lungs diminished at both bases, wheezing on inspiration, respirations are mildly labored but able to speak in full sentences, breath sounds are equal, no retractions, supracostal accessory muscle use noted.  Gastrointestinal: Soft, nontender, non distended  Musculoskeletal: No swelling, no deformity  Neurological: Alert and oriented to person, place, time, and situation  Lymphatics: No lymphadenopathy  Psychiatric: Cooperative, mildly anxious, otherwise appropriate mood & affect       COURSE & MEDICAL DECISION MAKING    ED Observation Status? Yes; I am placing the patient in to an observation status due to a diagnostic uncertainty as well as therapeutic intensity. Patient placed in observation status at 3:52 PM, 11/17/2023. " "    Observation plan is as follows: Patient medicated with DuoNeb nebulized.  She is already taken oral steroid at noon.  Differential diagnosis includes but not restricted to acute asthma, allergic reaction    1723: Patient reassessed, feeling much better after single DuoNeb.  Her work of breathing has improved, oxygen saturation 98%.  Repeat pulmonary exam demonstrates much improved air movement bilaterally.  Very faint expiratory wheeze still noted.    Patient Vitals for the past 24 hrs:   BP Temp Temp src Pulse Resp SpO2 Height Weight   11/17/23 1725 100/62 36.7 °C (98.1 °F) Temporal 78 18 98 % -- --   11/17/23 1547 (!) 82/55 -- -- 77 -- 98 % -- --   11/17/23 1538 97/47 -- -- -- -- -- -- --   11/17/23 1536 -- 36.5 °C (97.7 °F) Temporal 78 (!) 28 97 % -- --   11/17/23 1534 -- -- -- -- -- -- 1.626 m (5' 4\") 57 kg (125 lb 10.6 oz)        Upon Reevaluation, the patient's condition has: Improved; and will be discharged.    Patient discharged from ED Observation status at 1726 (Time) 11/17/23 (Date).     INITIAL ASSESSMENT, COURSE AND PLAN  Care Narrative: 33-year-old female with known history of allergic asthma presents for evaluation of acute dyspnea.  She had similar symptoms when exposed to dust in her garage.  She again went into her garage and was likely exposed to dust.  She again has dyspnea and wheezing despite her albuterol inhaler.  Reassuringly not hypoxic but patient was initially tachypneic and had diminished breath sounds with wheezing.  She is already had prednisone today.  Doing much better with DuoNeb.  She took Benadryl as well which I think is also starting to help.  Given dyspnea resolved and never hypoxic throughout observational status no indication for inpatient management.  I would like her to continue her steroid.  Patient is chronic quite well to DuoNeb and as such we will write her for that if she is having severe dyspnea as well, she does have a nebulizer device for home.  Patient is " planning to follow-up with pulmonology and has already been referred.        ADDITIONAL PROBLEM LIST  Allergic reaction, acute dyspnea, asthma exacerbation  DISPOSITION AND DISCUSSIONS  I have discussed management of the patient with the following physicians and JUANITO's:  NA    Discussion of management with other [facility] or appropriate source(s): None     Escalation of care considered, and ultimately not performed:NA    Barriers to care at this time, including but not limited to:  Not yet established with pulmonology .     Decision tools and prescription drugs considered including, but not limited to:  NA .    The patient will return for new or worsening symptoms and is stable at the time of discharge.    DISPOSITION:  Patient will be discharged home in stable condition.    FOLLOW UP:  Lacy Jernigan D.O.  [address] [facility] [id number] [phone]    Schedule an appointment as soon as possible for a visit         OUTPATIENT MEDICATIONS:  Discharge Medication List as of 11/17/2023  5:27 PM        START taking these medications    Details   ipratropium-albuterol (DUONEB) 0.5-2.5 (3) MG/3ML nebulizer solution Take 3 mL by nebulization every four hours as needed for Shortness of Breath., Disp-30 Each, R-1, Normal      ipratropium-albuterol (COMBIVENT RESPIMAT)  MCG/ACT Aero Soln Inhale 1 Puff 4 times a day as needed (Wheeze)., Disp-1 Each, R-1, Normal                FINAL DIAGNOSIS  1. Moderate persistent asthma with acute exacerbation           Electronically signed by: Jean Matta M.D., 11/17/2023 3:51 PM

## 2023-11-18 NOTE — ED NOTES
Med Rec completed per patient   Allergies reviewed  No ORAL antibiotics in last 30 days    Patient started a 4 day course of Prednisone on 11/15/2023    Home pharmacy [facility] on [facility]

## 2023-12-03 ENCOUNTER — HOSPITAL ENCOUNTER (EMERGENCY)
Facility: MEDICAL CENTER | Age: 33
End: 2023-12-03
Attending: EMERGENCY MEDICINE
Payer: MEDICAID

## 2023-12-03 VITALS
DIASTOLIC BLOOD PRESSURE: 76 MMHG | RESPIRATION RATE: 16 BRPM | OXYGEN SATURATION: 97 % | HEIGHT: 64 IN | HEART RATE: 86 BPM | WEIGHT: 127.65 LBS | BODY MASS INDEX: 21.79 KG/M2 | SYSTOLIC BLOOD PRESSURE: 122 MMHG | TEMPERATURE: 98.6 F

## 2023-12-03 DIAGNOSIS — R22.32 MASS OF LEFT AXILLA: ICD-10-CM

## 2023-12-03 DIAGNOSIS — I88.9 CERVICAL ADENITIS: ICD-10-CM

## 2023-12-03 DIAGNOSIS — R59.1 LYMPHADENOPATHY: ICD-10-CM

## 2023-12-03 LAB
ALBUMIN SERPL BCP-MCNC: 3.9 G/DL (ref 3.2–4.9)
ALBUMIN/GLOB SERPL: 1 G/DL
ALP SERPL-CCNC: 100 U/L (ref 30–99)
ALT SERPL-CCNC: 16 U/L (ref 2–50)
ANION GAP SERPL CALC-SCNC: 10 MMOL/L (ref 7–16)
AST SERPL-CCNC: 20 U/L (ref 12–45)
BASOPHILS # BLD AUTO: 0.8 % (ref 0–1.8)
BASOPHILS # BLD: 0.1 K/UL (ref 0–0.12)
BILIRUB SERPL-MCNC: 0.2 MG/DL (ref 0.1–1.5)
BUN SERPL-MCNC: 11 MG/DL (ref 8–22)
CALCIUM ALBUM COR SERPL-MCNC: 9.4 MG/DL (ref 8.5–10.5)
CALCIUM SERPL-MCNC: 9.3 MG/DL (ref 8.4–10.2)
CHLORIDE SERPL-SCNC: 99 MMOL/L (ref 96–112)
CO2 SERPL-SCNC: 29 MMOL/L (ref 20–33)
CREAT SERPL-MCNC: 0.73 MG/DL (ref 0.5–1.4)
CRP SERPL HS-MCNC: 8.17 MG/DL (ref 0–0.75)
EOSINOPHIL # BLD AUTO: 0.48 K/UL (ref 0–0.51)
EOSINOPHIL NFR BLD: 4 % (ref 0–6.9)
ERYTHROCYTE [DISTWIDTH] IN BLOOD BY AUTOMATED COUNT: 46.4 FL (ref 35.9–50)
GFR SERPLBLD CREATININE-BSD FMLA CKD-EPI: 112 ML/MIN/1.73 M 2
GLOBULIN SER CALC-MCNC: 3.8 G/DL (ref 1.9–3.5)
GLUCOSE SERPL-MCNC: 98 MG/DL (ref 65–99)
HCT VFR BLD AUTO: 39.3 % (ref 37–47)
HGB BLD-MCNC: 13.2 G/DL (ref 12–16)
IMM GRANULOCYTES # BLD AUTO: 0.04 K/UL (ref 0–0.11)
IMM GRANULOCYTES NFR BLD AUTO: 0.3 % (ref 0–0.9)
LYMPHOCYTES # BLD AUTO: 1.64 K/UL (ref 1–4.8)
LYMPHOCYTES NFR BLD: 13.6 % (ref 22–41)
MCH RBC QN AUTO: 30 PG (ref 27–33)
MCHC RBC AUTO-ENTMCNC: 33.6 G/DL (ref 32.2–35.5)
MCV RBC AUTO: 89.3 FL (ref 81.4–97.8)
MONOCYTES # BLD AUTO: 1.33 K/UL (ref 0–0.85)
MONOCYTES NFR BLD AUTO: 11 % (ref 0–13.4)
NEUTROPHILS # BLD AUTO: 8.45 K/UL (ref 1.82–7.42)
NEUTROPHILS NFR BLD: 70.3 % (ref 44–72)
NRBC # BLD AUTO: 0 K/UL
NRBC BLD-RTO: 0 /100 WBC (ref 0–0.2)
PLATELET # BLD AUTO: 470 K/UL (ref 164–446)
PMV BLD AUTO: 8.3 FL (ref 9–12.9)
POTASSIUM SERPL-SCNC: 4 MMOL/L (ref 3.6–5.5)
PROT SERPL-MCNC: 7.7 G/DL (ref 6–8.2)
RBC # BLD AUTO: 4.4 M/UL (ref 4.2–5.4)
SODIUM SERPL-SCNC: 138 MMOL/L (ref 135–145)
WBC # BLD AUTO: 12 K/UL (ref 4.8–10.8)

## 2023-12-03 PROCEDURE — A9270 NON-COVERED ITEM OR SERVICE: HCPCS | Performed by: EMERGENCY MEDICINE

## 2023-12-03 PROCEDURE — 700102 HCHG RX REV CODE 250 W/ 637 OVERRIDE(OP): Performed by: EMERGENCY MEDICINE

## 2023-12-03 PROCEDURE — 86140 C-REACTIVE PROTEIN: CPT

## 2023-12-03 PROCEDURE — 99283 EMERGENCY DEPT VISIT LOW MDM: CPT

## 2023-12-03 PROCEDURE — 36415 COLL VENOUS BLD VENIPUNCTURE: CPT

## 2023-12-03 PROCEDURE — 85025 COMPLETE CBC W/AUTO DIFF WBC: CPT

## 2023-12-03 PROCEDURE — 80053 COMPREHEN METABOLIC PANEL: CPT

## 2023-12-03 RX ORDER — DOXYCYCLINE 100 MG/1
100 CAPSULE ORAL 2 TIMES DAILY
Qty: 14 CAPSULE | Refills: 0 | Status: ACTIVE | OUTPATIENT
Start: 2023-12-03 | End: 2023-12-10

## 2023-12-03 RX ORDER — DOXYCYCLINE 100 MG/1
100 TABLET ORAL ONCE
Status: COMPLETED | OUTPATIENT
Start: 2023-12-03 | End: 2023-12-03

## 2023-12-03 RX ADMIN — DOXYCYCLINE 100 MG: 100 TABLET, FILM COATED ORAL at 21:00

## 2023-12-03 ASSESSMENT — FIBROSIS 4 INDEX: FIB4 SCORE: .4210526315789473684

## 2023-12-04 NOTE — ED PROVIDER NOTES
"ED Provider Note    CHIEF COMPLAINT  Chief Complaint   Patient presents with    Lump     Patient report of lump to the left axilla for the past 2 weeks now up to the left lateral neck that started swelling yesterday.  NO fever.  No redness or drainage noted.        EXTERNAL RECORDS REVIEWED  Outpatient Notes multiple previous evaluations for unrelated concerns    HPI/ROS  LIMITATION TO HISTORY   Select: : None  OUTSIDE HISTORIAN(S):  Family at bedside but does not contribute to history    Sol Jones is a 33 y.o. female who presents to the emergency department through triage with lumps in her left axilla and neck.  Patient describes 2 weeks of enlarging left axillary mass or \"lump\" now 3 days with progression of similar lumps to the left lateral neck.  Really nontender to palpation.  No erythema or warmth.  Denies history of similar symptoms or infection.  Denies recent travel or sick contacts.  Denies fevers, weight loss, night sweats.    PAST MEDICAL HISTORY   has a past medical history of ASTHMA, Ovarian cyst, and Psychiatric disorder.    SURGICAL HISTORY   has a past surgical history that includes gyn surgery and other.    FAMILY HISTORY  Family History   Problem Relation Age of Onset    Hypertension Mother        SOCIAL HISTORY  Social History     Tobacco Use    Smoking status: Some Days     Current packs/day: 0.50     Average packs/day: 0.5 packs/day for 5.0 years (2.5 ttl pk-yrs)     Types: Cigarettes    Smokeless tobacco: Never   Vaping Use    Vaping Use: Former   Substance and Sexual Activity    Alcohol use: Not Currently     Comment: very rare    Drug use: Not Currently     Types: Inhaled     Comment: marijuana    Sexual activity: Not on file       CURRENT MEDICATIONS  Home Medications       Reviewed by Nimisha Dent R.N. (Registered Nurse) on 12/03/23 at 1925  Med List Status: Partial     Medication Last Dose Status   albuterol (PROVENTIL) 2.5mg/3ml Nebu Soln solution for nebulization a few " "days Active   albuterol 108 (90 Base) MCG/ACT Aero Soln inhalation aerosol 12/2/2023 Active   busPIRone (BUSPAR) 30 MG tablet 12/2/2023 Active   ipratropium-albuterol (COMBIVENT RESPIMAT)  MCG/ACT Aero Soln 12/3/2023 Active   ipratropium-albuterol (DUONEB) 0.5-2.5 (3) MG/3ML nebulizer solution  Active   lamotrigine (LAMICTAL) 200 MG tablet 12/2/2023 Active   propranolol (INDERAL) 40 MG Tab 12/2/2023 Active                    ALLERGIES  No Known Allergies    PHYSICAL EXAM  VITAL SIGNS: /76   Pulse 86   Temp 37 °C (98.6 °F) (Temporal)   Resp 16   Ht 1.626 m (5' 4\")   Wt 57.9 kg (127 lb 10.3 oz)   LMP 11/01/2023 (Approximate)   SpO2 97%   BMI 21.91 kg/m²    Pulse ox interpretation: I interpret this pulse ox as normal.  Constitutional: Alert in no apparent distress.  HENT: Normocephalic, atraumatic. Bilateral external ears normal, Nose normal.   Eyes: Pupils are equal and reactive, Conjunctiva normal.   Neck: Normal range of motion, Supple.  Firm palpable mass x 3 left supraclavicular region and lateral neck, along anterior cervical chain.  Lymphatic: As noted in neck and thorax.  Cardiovascular: Normal peripheral perfusion.    Thorax & Lungs: Nonlabored respirations.  Larger 3 x 4 cm palpable mass in the left axilla.  Nontender.  No crepitus, fluctuance, induration.  No erythema or warmth  Abdomen: Soft, non-distended, non-tender to palpation.  No inguinal mass or lymphadenopathy  Skin: Warm, Dry, No erythema, No rash.   Musculoskeletal: Good range of motion in all major joints.    Neurologic: Alert and orient x 4.  Speech clear and cohesive.  Moves 4 extremity spontaneously.  Psychiatric: Anxious otherwise affect normal, Judgment normal, Mood normal.       DIAGNOSTIC STUDIES / PROCEDURES      LABS  Results for orders placed or performed during the hospital encounter of 12/03/23   CBC WITH DIFFERENTIAL   Result Value Ref Range    WBC 12.0 (H) 4.8 - 10.8 K/uL    RBC 4.40 4.20 - 5.40 M/uL    " Hemoglobin 13.2 12.0 - 16.0 g/dL    Hematocrit 39.3 37.0 - 47.0 %    MCV 89.3 81.4 - 97.8 fL    MCH 30.0 27.0 - 33.0 pg    MCHC 33.6 32.2 - 35.5 g/dL    RDW 46.4 35.9 - 50.0 fL    Platelet Count 470 (H) 164 - 446 K/uL    MPV 8.3 (L) 9.0 - 12.9 fL    Neutrophils-Polys 70.30 44.00 - 72.00 %    Lymphocytes 13.60 (L) 22.00 - 41.00 %    Monocytes 11.00 0.00 - 13.40 %    Eosinophils 4.00 0.00 - 6.90 %    Basophils 0.80 0.00 - 1.80 %    Immature Granulocytes 0.30 0.00 - 0.90 %    Nucleated RBC 0.00 0.00 - 0.20 /100 WBC    Neutrophils (Absolute) 8.45 (H) 1.82 - 7.42 K/uL    Lymphs (Absolute) 1.64 1.00 - 4.80 K/uL    Monos (Absolute) 1.33 (H) 0.00 - 0.85 K/uL    Eos (Absolute) 0.48 0.00 - 0.51 K/uL    Baso (Absolute) 0.10 0.00 - 0.12 K/uL    Immature Granulocytes (abs) 0.04 0.00 - 0.11 K/uL    NRBC (Absolute) 0.00 K/uL   COMP METABOLIC PANEL   Result Value Ref Range    Sodium 138 135 - 145 mmol/L    Potassium 4.0 3.6 - 5.5 mmol/L    Chloride 99 96 - 112 mmol/L    Co2 29 20 - 33 mmol/L    Anion Gap 10.0 7.0 - 16.0    Glucose 98 65 - 99 mg/dL    Bun 11 8 - 22 mg/dL    Creatinine 0.73 0.50 - 1.40 mg/dL    Calcium 9.3 8.4 - 10.2 mg/dL    Correct Calcium 9.4 8.5 - 10.5 mg/dL    AST(SGOT) 20 12 - 45 U/L    ALT(SGPT) 16 2 - 50 U/L    Alkaline Phosphatase 100 (H) 30 - 99 U/L    Total Bilirubin 0.2 0.1 - 1.5 mg/dL    Albumin 3.9 3.2 - 4.9 g/dL    Total Protein 7.7 6.0 - 8.2 g/dL    Globulin 3.8 (H) 1.9 - 3.5 g/dL    A-G Ratio 1.0 g/dL   CRP QUANTITIVE (NON-CARDIAC)   Result Value Ref Range    Stat C-Reactive Protein 8.17 (H) 0.00 - 0.75 mg/dL   ESTIMATED GFR   Result Value Ref Range    GFR (CKD-EPI) 112 >60 mL/min/1.73 m 2         COURSE & MEDICAL DECISION MAKING    ED Observation Status? No; Patient does not meet criteria for ED Observation.     INITIAL ASSESSMENT, COURSE AND PLAN  Care Narrative:   ED evaluation for left axillary mass, now also with left supraclavicular and neck mass versus lymphadenopathy.  Nontender.  No clinical  evidence for cellulitis or abscess, but given presentation and acute issue be treated empirically with doxycycline for a week.  First dose provided in the emergency department.  Patient did have mild leukocytosis with WBC 12.0, no left shift or bandemia.  No leukopenia, pancytopenia.  CRP was elevated at 8.17.  No electrolyte derangement.  Hemodynamically stable without fever, tachycardia, hypotension or hypoxia.  Clinically nontoxic.    Patient is strongly encouraged to follow-up with primary care, but more so surgical oncology this week for reevaluation and further workup or biopsy if indicated if no improvement with antibiotics as concern for other cause lymphadenopathy/adenitis, malignancy persists.  Referral has been placed in [facility] for surgical oncology/Dr. Avalos.  She is aware of treatment plan, concerns and agreeable to follow-up.  Her mother is at bedside and and agreeable as well.    ADDITIONAL PROBLEM LIST  Anxiety    DISPOSITION AND DISCUSSIONS    Escalation of care considered, and ultimately not performed:diagnostic imaging and acute inpatient care management, however at this time, the patient is most appropriate for outpatient management    The patient is stable for discharge home, anticipatory guidance provided, doxycycline for 7 days, close follow-up is encouraged and strict return instructions have been discussed. Patient is agreeable to the disposition and plan.      FINAL DIAGNOSIS  1. Lymphadenopathy    2. Mass of left axilla    3. Cervical adenitis           Electronically signed by: Zoraida Swann D.O., 12/3/2023 8:03 PM

## 2023-12-04 NOTE — DISCHARGE INSTRUCTIONS
Follow-up with primary care for reevaluation, to establish care And follow through with referral to Surgical Oncology for reevaluation and biopsy if indicated and lesions persistent after antibiotics.      Follow-up with Surgical Oncology this week for reevaluation, further workup and biopsy if indicated.  Call Dr. Avalos's office tomorrow morning, references emergency department visit and schedule an appointment for follow-up.    Doxycycline twice daily for 7 days.    Return to the emergency department for persistent or worsening masses/lumps, redness, drainage, fever, vomiting, altered mental status or other new concerns.

## 2023-12-04 NOTE — ED TRIAGE NOTES
"33 yr old female to triage  Chief Complaint   Patient presents with    Lump     Patient report of lump to the left axilla for the past 2 weeks now up to the left lateral neck that started swelling yesterday.  NO fever.  No redness or drainage noted.      /73   Pulse (!) 108   Temp 36.8 °C (98.3 °F) (Temporal)   Resp 18   Ht 1.626 m (5' 4\")   Wt 57.9 kg (127 lb 10.3 oz)   LMP 11/01/2023 (Approximate)   SpO2 97%   BMI 21.91 kg/m²     "

## 2023-12-14 DIAGNOSIS — R59.9 ADENOPATHY: ICD-10-CM

## 2023-12-15 ENCOUNTER — HOSPITAL ENCOUNTER (EMERGENCY)
Facility: MEDICAL CENTER | Age: 33
End: 2023-12-15
Attending: EMERGENCY MEDICINE
Payer: MEDICAID

## 2023-12-15 VITALS
SYSTOLIC BLOOD PRESSURE: 111 MMHG | DIASTOLIC BLOOD PRESSURE: 57 MMHG | TEMPERATURE: 97 F | BODY MASS INDEX: 21.87 KG/M2 | HEIGHT: 64 IN | RESPIRATION RATE: 18 BRPM | WEIGHT: 128.09 LBS | OXYGEN SATURATION: 98 % | HEART RATE: 98 BPM

## 2023-12-15 DIAGNOSIS — J45.41 MODERATE PERSISTENT ASTHMA WITH ACUTE EXACERBATION: ICD-10-CM

## 2023-12-15 DIAGNOSIS — T78.40XA ALLERGIC REACTION, INITIAL ENCOUNTER: ICD-10-CM

## 2023-12-15 PROCEDURE — 96374 THER/PROPH/DIAG INJ IV PUSH: CPT

## 2023-12-15 PROCEDURE — 94760 N-INVAS EAR/PLS OXIMETRY 1: CPT

## 2023-12-15 PROCEDURE — 96372 THER/PROPH/DIAG INJ SC/IM: CPT

## 2023-12-15 PROCEDURE — 94644 CONT INHLJ TX 1ST HOUR: CPT

## 2023-12-15 PROCEDURE — 96375 TX/PRO/DX INJ NEW DRUG ADDON: CPT

## 2023-12-15 PROCEDURE — 700111 HCHG RX REV CODE 636 W/ 250 OVERRIDE (IP): Performed by: EMERGENCY MEDICINE

## 2023-12-15 PROCEDURE — 700101 HCHG RX REV CODE 250: Performed by: EMERGENCY MEDICINE

## 2023-12-15 PROCEDURE — 99284 EMERGENCY DEPT VISIT MOD MDM: CPT

## 2023-12-15 PROCEDURE — 700101 HCHG RX REV CODE 250

## 2023-12-15 PROCEDURE — 94640 AIRWAY INHALATION TREATMENT: CPT

## 2023-12-15 RX ORDER — FAMOTIDINE 40 MG/1
40 TABLET, FILM COATED ORAL DAILY
Qty: 7 TABLET | Refills: 0 | Status: ON HOLD | OUTPATIENT
Start: 2023-12-15 | End: 2024-01-08

## 2023-12-15 RX ORDER — IPRATROPIUM BROMIDE AND ALBUTEROL SULFATE 2.5; .5 MG/3ML; MG/3ML
3 SOLUTION RESPIRATORY (INHALATION) ONCE
Status: COMPLETED | OUTPATIENT
Start: 2023-12-15 | End: 2023-12-15

## 2023-12-15 RX ORDER — DIPHENHYDRAMINE HYDROCHLORIDE 50 MG/ML
50 INJECTION INTRAMUSCULAR; INTRAVENOUS ONCE
Status: COMPLETED | OUTPATIENT
Start: 2023-12-15 | End: 2023-12-15

## 2023-12-15 RX ORDER — EPINEPHRINE 1 MG/ML(1)
0.3 AMPUL (ML) INJECTION ONCE
Status: COMPLETED | OUTPATIENT
Start: 2023-12-15 | End: 2023-12-15

## 2023-12-15 RX ORDER — DEXAMETHASONE SODIUM PHOSPHATE 4 MG/ML
10 INJECTION, SOLUTION INTRA-ARTICULAR; INTRALESIONAL; INTRAMUSCULAR; INTRAVENOUS; SOFT TISSUE ONCE
Status: COMPLETED | OUTPATIENT
Start: 2023-12-15 | End: 2023-12-15

## 2023-12-15 RX ORDER — IPRATROPIUM BROMIDE AND ALBUTEROL SULFATE 2.5; .5 MG/3ML; MG/3ML
SOLUTION RESPIRATORY (INHALATION)
Status: COMPLETED
Start: 2023-12-15 | End: 2023-12-15

## 2023-12-15 RX ORDER — PREDNISONE 20 MG/1
40 TABLET ORAL DAILY
Qty: 12 TABLET | Refills: 0 | Status: SHIPPED | OUTPATIENT
Start: 2023-12-15 | End: 2023-12-21

## 2023-12-15 RX ORDER — IPRATROPIUM BROMIDE AND ALBUTEROL SULFATE 2.5; .5 MG/3ML; MG/3ML
3 SOLUTION RESPIRATORY (INHALATION) EVERY 6 HOURS PRN
Qty: 30 EACH | Refills: 1 | Status: ON HOLD | OUTPATIENT
Start: 2023-12-15 | End: 2024-01-09

## 2023-12-15 RX ADMIN — IPRATROPIUM BROMIDE AND ALBUTEROL SULFATE 3 ML: 2.5; .5 SOLUTION RESPIRATORY (INHALATION) at 01:21

## 2023-12-15 RX ADMIN — FAMOTIDINE 20 MG: 10 INJECTION, SOLUTION INTRAVENOUS at 01:23

## 2023-12-15 RX ADMIN — DIPHENHYDRAMINE HYDROCHLORIDE 50 MG: 50 INJECTION, SOLUTION INTRAMUSCULAR; INTRAVENOUS at 01:23

## 2023-12-15 RX ADMIN — IPRATROPIUM BROMIDE AND ALBUTEROL SULFATE 3 ML: .5; 3 SOLUTION RESPIRATORY (INHALATION) at 01:21

## 2023-12-15 RX ADMIN — ALBUTEROL SULFATE 5 MG: 2.5 SOLUTION RESPIRATORY (INHALATION) at 02:07

## 2023-12-15 RX ADMIN — DEXAMETHASONE SODIUM PHOSPHATE 10 MG: 4 INJECTION INTRA-ARTICULAR; INTRALESIONAL; INTRAMUSCULAR; INTRAVENOUS; SOFT TISSUE at 01:23

## 2023-12-15 RX ADMIN — EPINEPHRINE 0.3 MG: 1 INJECTION INTRAMUSCULAR; INTRAVENOUS; SUBCUTANEOUS at 01:36

## 2023-12-15 ASSESSMENT — FIBROSIS 4 INDEX: FIB4 SCORE: 0.34

## 2023-12-15 NOTE — ED TRIAGE NOTES
"Patient presents to the ER with the following complaints:    Chief Complaint   Patient presents with    Asthma     Patient states she might be having an allergic reaction to insulation has been seen here for this before and diagnosis ed with an asthma attack.        /61   Pulse (!) 122   Temp 36.1 °C (97 °F) (Temporal)   Resp (!) 26   Ht 1.626 m (5' 4\")   Wt 58.1 kg (128 lb 1.4 oz)   LMP 11/01/2023 (Approximate)   SpO2 94%   BMI 21.99 kg/m²       "

## 2023-12-15 NOTE — ED NOTES
Vital signs taken and recorded. IV removed. Discharge in stable condition ambulatory accompanied by mother. Health teachings given to patient and family with full understanding of the information given. No personal belongings left.

## 2023-12-15 NOTE — ED PROVIDER NOTES
ED Provider Note    CHIEF COMPLAINT  Chief Complaint   Patient presents with    Asthma     Patient states she might be having an allergic reaction to insulation has been seen here for this before and diagnosis ed with an asthma attack.        EXTERNAL RECORDS REVIEWED  Care everywhere patient was seen at [facility] on 7/19/2023, 7/20/2023, 11/5/2023, 1115/23, 11/17/2023    HPI/ROS  LIMITATION TO HISTORY   Select: : None  OUTSIDE HISTORIAN(S):  Mother    Sol Jones is a 33 y.o. female who presents tonight with her mother with chief complaint of severe difficulty breathing.  Patient has a history of asthma and has been recently working with some insulation while they are doing a project in the garage.  This is a second time that she has had this reaction to the insulation.  Her mom states that she believes she breathed in some dust from this.  She denies any recent fever, chills, productive cough.  She has an inhaler that she uses at home that she used 1 hour prior to arrival with no success.    PAST MEDICAL HISTORY   has a past medical history of ASTHMA, Ovarian cyst, and Psychiatric disorder.    SURGICAL HISTORY   has a past surgical history that includes gyn surgery and other.    FAMILY HISTORY  Family History   Problem Relation Age of Onset    Hypertension Mother        SOCIAL HISTORY  Social History     Tobacco Use    Smoking status: Some Days     Current packs/day: 0.50     Average packs/day: 0.5 packs/day for 5.0 years (2.5 ttl pk-yrs)     Types: Cigarettes    Smokeless tobacco: Never   Vaping Use    Vaping Use: Former   Substance and Sexual Activity    Alcohol use: Not Currently     Comment: very rare    Drug use: Not Currently     Types: Inhaled     Comment: marijuana    Sexual activity: Not on file       CURRENT MEDICATIONS  Home Medications       Reviewed by Chino Jhaveri R.N. (Registered Nurse) on 12/15/23 at 0108  Med List Status: Not Addressed     Medication Last Dose Status   albuterol  "(PROVENTIL) 2.5mg/3ml Nebu Soln solution for nebulization  Active   albuterol 108 (90 Base) MCG/ACT Aero Soln inhalation aerosol  Active   busPIRone (BUSPAR) 30 MG tablet  Active   ipratropium-albuterol (COMBIVENT RESPIMAT)  MCG/ACT Aero Soln  Active   ipratropium-albuterol (DUONEB) 0.5-2.5 (3) MG/3ML nebulizer solution  Active   lamotrigine (LAMICTAL) 200 MG tablet  Active   propranolol (INDERAL) 40 MG Tab  Active                    ALLERGIES  No Known Allergies    PHYSICAL EXAM  VITAL SIGNS: /57   Pulse 98   Temp 36.1 °C (97 °F) (Temporal)   Resp 18   Ht 1.626 m (5' 4\")   Wt 58.1 kg (128 lb 1.4 oz)   LMP 11/01/2023 (Approximate)   SpO2 98%   BMI 21.99 kg/m²    Constitutional: Patient is well developed, well nourished.  Moderate respiratory distress speaking 2 word sentences.  Audible wheezing.  HENT: Normocephalic,  Nose normal with no mucosal edema or drainage. Oropharynx moist without erythema or exudates.  Eyes: PERRL, EOMI, Conjunctiva without erythema  Neck: Supple with  No stridor.  Lymphatic: No lymphadenopathy noted.   Cardiovascular: Tachycardic without murmur  Thorax & Lungs: Diminished air exchange diffusely with tight expiratory wheezing, no rhonchi or rales.  Abdomen: Bowel sounds normal in all four quadrants. Soft,nontender  Skin: Warm, Dry, no rashes.  Extremities: Peripheral pulses 4/4  No cyanosis  Musculoskeletal: Normal range of motion in all major joints. No tenderness to palpation or major deformities noted.   Neurologic: Alert & oriented x 3, Normal motor function, Normal sensory function  Psychiatric: Affect anxious      COURSE & MEDICAL DECISION MAKING    ED Observation Status? No; Patient does not meet criteria for ED Observation.     INITIAL ASSESSMENT, COURSE AND PLAN  Care Narrative: Patient received a Hep-Lock IV along with Pepcid, Benadryl, Decadron, epinephrine and a DuoNeb treatment.  After a period of time she was markedly improved but still states that she felt " tight so she was given a continuous albuterol treatment.  Upon recheck she is markedly improved, speaking full sentences without any wheezing and states she would like to go home.  Prescriptions for DuoNeb inhalation solution, prednisone were given.        ADDITIONAL PROBLEM LIST  Anxiety  DISPOSITION AND DISCUSSIONS  I have discussed management of the patient with the following physicians and JUANITO's: None    Discussion of management with other QHP or appropriate source(s): None     Escalation of care considered, and ultimately not performed:diagnostic imaging    Barriers to care at this time, including but not limited to:  None .     Decision tools and prescription drugs considered including, but not limited to: DuoNeb inhalation solution, prednisone and Pepcid.  She is to use over-the-counter Benadryl as needed..    FINAL DIAGNOSIS  1. Moderate persistent asthma with acute exacerbation    2. Allergic reaction, initial encounter           Electronically signed by: Jeanna Hansen D.O., 12/15/2023 5:57 AM

## 2023-12-15 NOTE — ED NOTES
Nebulization done. 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  Patient currently in critical condition.     - Care per SICU   - Begin TPN

## 2023-12-15 NOTE — DISCHARGE INSTRUCTIONS
Stay away from the respiratory irritant that has been causing your allergic reactions.  Take the medications and prescribing you as directed with food  I am giving you DuoNeb to take for your nebulized treatments  Follow-up with your primary care provider this next week for recheck and return if any problems or worsening.

## 2024-01-03 ENCOUNTER — APPOINTMENT (OUTPATIENT)
Dept: RADIOLOGY | Facility: MEDICAL CENTER | Age: 34
End: 2024-01-03
Attending: STUDENT IN AN ORGANIZED HEALTH CARE EDUCATION/TRAINING PROGRAM
Payer: MEDICAID

## 2024-01-03 ENCOUNTER — HOSPITAL ENCOUNTER (EMERGENCY)
Facility: MEDICAL CENTER | Age: 34
End: 2024-01-04
Attending: STUDENT IN AN ORGANIZED HEALTH CARE EDUCATION/TRAINING PROGRAM
Payer: MEDICAID

## 2024-01-03 DIAGNOSIS — J18.9 COMMUNITY ACQUIRED PNEUMONIA, UNSPECIFIED LATERALITY: ICD-10-CM

## 2024-01-03 DIAGNOSIS — J96.01 ACUTE RESPIRATORY FAILURE WITH HYPOXEMIA (HCC): ICD-10-CM

## 2024-01-03 DIAGNOSIS — R22.1 MASS IN NECK: ICD-10-CM

## 2024-01-03 DIAGNOSIS — J96.02 SEPSIS WITH ACUTE HYPERCAPNIC RESPIRATORY FAILURE AND SEPTIC SHOCK, DUE TO UNSPECIFIED ORGANISM (HCC): ICD-10-CM

## 2024-01-03 DIAGNOSIS — I26.99 ACUTE PULMONARY EMBOLISM WITHOUT ACUTE COR PULMONALE, UNSPECIFIED PULMONARY EMBOLISM TYPE (HCC): ICD-10-CM

## 2024-01-03 DIAGNOSIS — R65.21 SEPSIS WITH ACUTE HYPERCAPNIC RESPIRATORY FAILURE AND SEPTIC SHOCK, DUE TO UNSPECIFIED ORGANISM (HCC): ICD-10-CM

## 2024-01-03 DIAGNOSIS — E87.29 RESPIRATORY ACIDOSIS: ICD-10-CM

## 2024-01-03 DIAGNOSIS — A41.9 SEPSIS WITH ACUTE HYPERCAPNIC RESPIRATORY FAILURE AND SEPTIC SHOCK, DUE TO UNSPECIFIED ORGANISM (HCC): ICD-10-CM

## 2024-01-03 LAB
FLUAV RNA SPEC QL NAA+PROBE: NEGATIVE
FLUBV RNA SPEC QL NAA+PROBE: NEGATIVE
RSV RNA SPEC QL NAA+PROBE: NEGATIVE
SARS-COV-2 RNA RESP QL NAA+PROBE: NOTDETECTED
SPECIMEN SOURCE: NORMAL

## 2024-01-03 PROCEDURE — 700111 HCHG RX REV CODE 636 W/ 250 OVERRIDE (IP): Performed by: STUDENT IN AN ORGANIZED HEALTH CARE EDUCATION/TRAINING PROGRAM

## 2024-01-03 PROCEDURE — 96375 TX/PRO/DX INJ NEW DRUG ADDON: CPT | Mod: XU

## 2024-01-03 PROCEDURE — 36415 COLL VENOUS BLD VENIPUNCTURE: CPT

## 2024-01-03 PROCEDURE — 94760 N-INVAS EAR/PLS OXIMETRY 1: CPT

## 2024-01-03 PROCEDURE — 85025 COMPLETE CBC W/AUTO DIFF WBC: CPT

## 2024-01-03 PROCEDURE — 0241U HCHG SARS-COV-2 COVID-19 NFCT DS RESP RNA 4 TRGT MIC: CPT

## 2024-01-03 PROCEDURE — 80307 DRUG TEST PRSMV CHEM ANLYZR: CPT

## 2024-01-03 PROCEDURE — 99285 EMERGENCY DEPT VISIT HI MDM: CPT

## 2024-01-03 PROCEDURE — 83605 ASSAY OF LACTIC ACID: CPT

## 2024-01-03 PROCEDURE — 71045 X-RAY EXAM CHEST 1 VIEW: CPT

## 2024-01-03 PROCEDURE — 94640 AIRWAY INHALATION TREATMENT: CPT

## 2024-01-03 PROCEDURE — 700105 HCHG RX REV CODE 258: Performed by: STUDENT IN AN ORGANIZED HEALTH CARE EDUCATION/TRAINING PROGRAM

## 2024-01-03 PROCEDURE — 700101 HCHG RX REV CODE 250: Performed by: STUDENT IN AN ORGANIZED HEALTH CARE EDUCATION/TRAINING PROGRAM

## 2024-01-03 PROCEDURE — 81025 URINE PREGNANCY TEST: CPT

## 2024-01-03 PROCEDURE — 96372 THER/PROPH/DIAG INJ SC/IM: CPT

## 2024-01-03 PROCEDURE — 80053 COMPREHEN METABOLIC PANEL: CPT

## 2024-01-03 RX ORDER — SODIUM CHLORIDE, SODIUM LACTATE, POTASSIUM CHLORIDE, CALCIUM CHLORIDE 600; 310; 30; 20 MG/100ML; MG/100ML; MG/100ML; MG/100ML
1000 INJECTION, SOLUTION INTRAVENOUS ONCE
Status: COMPLETED | OUTPATIENT
Start: 2024-01-03 | End: 2024-01-03

## 2024-01-03 RX ORDER — EPINEPHRINE 1 MG/ML(1)
0.3 AMPUL (ML) INJECTION ONCE
Status: COMPLETED | OUTPATIENT
Start: 2024-01-03 | End: 2024-01-03

## 2024-01-03 RX ORDER — DIAZEPAM 5 MG/ML
5 INJECTION, SOLUTION INTRAMUSCULAR; INTRAVENOUS ONCE
Status: COMPLETED | OUTPATIENT
Start: 2024-01-03 | End: 2024-01-03

## 2024-01-03 RX ORDER — MAGNESIUM SULFATE HEPTAHYDRATE 40 MG/ML
2 INJECTION, SOLUTION INTRAVENOUS ONCE
Status: DISCONTINUED | OUTPATIENT
Start: 2024-01-03 | End: 2024-01-03

## 2024-01-03 RX ORDER — MAGNESIUM SULFATE HEPTAHYDRATE 40 MG/ML
2 INJECTION, SOLUTION INTRAVENOUS ONCE
Status: COMPLETED | OUTPATIENT
Start: 2024-01-03 | End: 2024-01-03

## 2024-01-03 RX ORDER — METHYLPREDNISOLONE SODIUM SUCCINATE 125 MG/2ML
125 INJECTION, POWDER, LYOPHILIZED, FOR SOLUTION INTRAMUSCULAR; INTRAVENOUS ONCE
Status: COMPLETED | OUTPATIENT
Start: 2024-01-03 | End: 2024-01-03

## 2024-01-03 RX ORDER — DIPHENHYDRAMINE HYDROCHLORIDE 50 MG/ML
25 INJECTION INTRAMUSCULAR; INTRAVENOUS ONCE
Status: COMPLETED | OUTPATIENT
Start: 2024-01-03 | End: 2024-01-03

## 2024-01-03 RX ORDER — MIDAZOLAM HYDROCHLORIDE 1 MG/ML
2 INJECTION INTRAMUSCULAR; INTRAVENOUS ONCE
Status: COMPLETED | OUTPATIENT
Start: 2024-01-03 | End: 2024-01-03

## 2024-01-03 RX ADMIN — KETAMINE HYDROCHLORIDE 25 MG: 10 INJECTION INTRAMUSCULAR; INTRAVENOUS at 21:24

## 2024-01-03 RX ADMIN — Medication 10 MG/HR: at 22:38

## 2024-01-03 RX ADMIN — SODIUM CHLORIDE, POTASSIUM CHLORIDE, SODIUM LACTATE AND CALCIUM CHLORIDE 1000 ML: 600; 310; 30; 20 INJECTION, SOLUTION INTRAVENOUS at 23:55

## 2024-01-03 RX ADMIN — SODIUM CHLORIDE, POTASSIUM CHLORIDE, SODIUM LACTATE AND CALCIUM CHLORIDE 1000 ML: 600; 310; 30; 20 INJECTION, SOLUTION INTRAVENOUS at 21:08

## 2024-01-03 RX ADMIN — MAGNESIUM SULFATE HEPTAHYDRATE 2 G: 2 INJECTION, SOLUTION INTRAVENOUS at 21:15

## 2024-01-03 RX ADMIN — EPINEPHRINE 0.3 MG: 1 INJECTION INTRAMUSCULAR; INTRAVENOUS; SUBCUTANEOUS at 21:25

## 2024-01-03 RX ADMIN — DIPHENHYDRAMINE HYDROCHLORIDE 25 MG: 50 INJECTION, SOLUTION INTRAMUSCULAR; INTRAVENOUS at 22:54

## 2024-01-03 RX ADMIN — Medication 15 MG/HR: at 21:05

## 2024-01-03 RX ADMIN — IPRATROPIUM BROMIDE 0.5 MG: 0.5 SOLUTION RESPIRATORY (INHALATION) at 21:05

## 2024-01-03 RX ADMIN — DIAZEPAM 5 MG: 5 INJECTION, SOLUTION INTRAMUSCULAR; INTRAVENOUS at 23:03

## 2024-01-03 RX ADMIN — METHYLPREDNISOLONE SODIUM SUCCINATE 125 MG: 125 INJECTION, POWDER, FOR SOLUTION INTRAMUSCULAR; INTRAVENOUS at 21:08

## 2024-01-03 RX ADMIN — DIPHENHYDRAMINE HYDROCHLORIDE 25 MG: 50 INJECTION INTRAMUSCULAR; INTRAVENOUS at 22:36

## 2024-01-03 RX ADMIN — MIDAZOLAM HYDROCHLORIDE 2 MG: 1 INJECTION, SOLUTION INTRAMUSCULAR; INTRAVENOUS at 21:50

## 2024-01-03 ASSESSMENT — FIBROSIS 4 INDEX: FIB4 SCORE: 0.34

## 2024-01-04 ENCOUNTER — APPOINTMENT (OUTPATIENT)
Dept: RADIOLOGY | Facility: MEDICAL CENTER | Age: 34
DRG: 823 | End: 2024-01-04
Attending: EMERGENCY MEDICINE
Payer: MEDICAID

## 2024-01-04 ENCOUNTER — HOSPITAL ENCOUNTER (INPATIENT)
Facility: MEDICAL CENTER | Age: 34
LOS: 7 days | DRG: 823 | End: 2024-01-11
Attending: STUDENT IN AN ORGANIZED HEALTH CARE EDUCATION/TRAINING PROGRAM | Admitting: STUDENT IN AN ORGANIZED HEALTH CARE EDUCATION/TRAINING PROGRAM
Payer: MEDICAID

## 2024-01-04 ENCOUNTER — APPOINTMENT (OUTPATIENT)
Dept: RADIOLOGY | Facility: MEDICAL CENTER | Age: 34
End: 2024-01-04
Attending: STUDENT IN AN ORGANIZED HEALTH CARE EDUCATION/TRAINING PROGRAM
Payer: MEDICAID

## 2024-01-04 ENCOUNTER — APPOINTMENT (OUTPATIENT)
Dept: RADIOLOGY | Facility: MEDICAL CENTER | Age: 34
End: 2024-01-04
Attending: EMERGENCY MEDICINE
Payer: MEDICAID

## 2024-01-04 VITALS
SYSTOLIC BLOOD PRESSURE: 141 MMHG | HEART RATE: 110 BPM | HEIGHT: 64 IN | OXYGEN SATURATION: 98 % | DIASTOLIC BLOOD PRESSURE: 60 MMHG | WEIGHT: 130.95 LBS | TEMPERATURE: 99.9 F | BODY MASS INDEX: 22.36 KG/M2 | RESPIRATION RATE: 29 BRPM

## 2024-01-04 DIAGNOSIS — F41.9 ANXIETY: ICD-10-CM

## 2024-01-04 DIAGNOSIS — J45.901 ASTHMA WITH ACUTE EXACERBATION, UNSPECIFIED ASTHMA SEVERITY, UNSPECIFIED WHETHER PERSISTENT: ICD-10-CM

## 2024-01-04 DIAGNOSIS — R59.1 LYMPHADENOPATHY: ICD-10-CM

## 2024-01-04 DIAGNOSIS — C81.98 HODGKIN LYMPHOMA OF LYMPH NODES OF MULTIPLE REGIONS, UNSPECIFIED HODGKIN LYMPHOMA TYPE (HCC): ICD-10-CM

## 2024-01-04 PROBLEM — I26.99 PULMONARY EMBOLI (HCC): Status: ACTIVE | Noted: 2024-01-04

## 2024-01-04 PROBLEM — J96.01 ACUTE RESPIRATORY FAILURE WITH HYPOXIA (HCC): Status: ACTIVE | Noted: 2024-01-04

## 2024-01-04 LAB
ALBUMIN SERPL BCP-MCNC: 3.7 G/DL (ref 3.2–4.9)
ALBUMIN SERPL BCP-MCNC: 4 G/DL (ref 3.2–4.9)
ALBUMIN/GLOB SERPL: 1 G/DL
ALP SERPL-CCNC: 105 U/L (ref 30–99)
ALP SERPL-CCNC: 94 U/L (ref 30–99)
ALT SERPL-CCNC: 18 U/L (ref 2–50)
ALT SERPL-CCNC: 19 U/L (ref 2–50)
AMPHET UR QL SCN: NEGATIVE
ANION GAP SERPL CALC-SCNC: 14 MMOL/L (ref 7–16)
ANION GAP SERPL CALC-SCNC: 15 MMOL/L (ref 7–16)
APTT PPP: 31 SEC (ref 24.7–36)
ARTERIAL PATENCY WRIST A: ABNORMAL
ARTERIAL PATENCY WRIST A: ABNORMAL
AST SERPL-CCNC: 22 U/L (ref 12–45)
AST SERPL-CCNC: 26 U/L (ref 12–45)
BARBITURATES UR QL SCN: NEGATIVE
BASE EXCESS BLDA CALC-SCNC: -2 MMOL/L (ref -4–3)
BASE EXCESS BLDA CALC-SCNC: -3 MMOL/L (ref -4–3)
BASOPHILS # BLD AUTO: 0 % (ref 0–1.8)
BASOPHILS # BLD AUTO: 0.2 % (ref 0–1.8)
BASOPHILS # BLD: 0 K/UL (ref 0–0.12)
BASOPHILS # BLD: 0.06 K/UL (ref 0–0.12)
BENZODIAZ UR QL SCN: NEGATIVE
BILIRUB CONJ SERPL-MCNC: <0.2 MG/DL (ref 0.1–0.5)
BILIRUB INDIRECT SERPL-MCNC: NORMAL MG/DL (ref 0–1)
BILIRUB SERPL-MCNC: 0.2 MG/DL (ref 0.1–1.5)
BILIRUB SERPL-MCNC: <0.2 MG/DL (ref 0.1–1.5)
BODY TEMPERATURE: ABNORMAL DEGREES
BODY TEMPERATURE: ABNORMAL DEGREES
BREATHS SETTING VENT: 12
BREATHS SETTING VENT: 22
BUN SERPL-MCNC: 10 MG/DL (ref 8–22)
BUN SERPL-MCNC: 12 MG/DL (ref 8–22)
BZE UR QL SCN: NEGATIVE
CA-I SERPL-SCNC: 1.2 MMOL/L (ref 1.1–1.3)
CALCIUM ALBUM COR SERPL-MCNC: 9.4 MG/DL (ref 8.5–10.5)
CALCIUM SERPL-MCNC: 8.7 MG/DL (ref 8.5–10.5)
CALCIUM SERPL-MCNC: 9.4 MG/DL (ref 8.4–10.2)
CANNABINOIDS UR QL SCN: POSITIVE
CHLORIDE SERPL-SCNC: 96 MMOL/L (ref 96–112)
CHLORIDE SERPL-SCNC: 99 MMOL/L (ref 96–112)
CO2 BLDA-SCNC: 26 MMOL/L (ref 20–33)
CO2 BLDA-SCNC: 27 MMOL/L (ref 20–33)
CO2 SERPL-SCNC: 22 MMOL/L (ref 20–33)
CO2 SERPL-SCNC: 23 MMOL/L (ref 20–33)
CREAT SERPL-MCNC: 0.79 MG/DL (ref 0.5–1.4)
CREAT SERPL-MCNC: 0.82 MG/DL (ref 0.5–1.4)
DELSYS IDSYS: ABNORMAL
DELSYS IDSYS: ABNORMAL
EKG IMPRESSION: NORMAL
EKG IMPRESSION: NORMAL
END TIDAL CARBON DIOXIDE IECO2: 40 MMHG
EOSINOPHIL # BLD AUTO: 0 K/UL (ref 0–0.51)
EOSINOPHIL # BLD AUTO: 0.05 K/UL (ref 0–0.51)
EOSINOPHIL NFR BLD: 0 % (ref 0–6.9)
EOSINOPHIL NFR BLD: 0.2 % (ref 0–6.9)
ERYTHROCYTE [DISTWIDTH] IN BLOOD BY AUTOMATED COUNT: 44.6 FL (ref 35.9–50)
ERYTHROCYTE [DISTWIDTH] IN BLOOD BY AUTOMATED COUNT: 47 FL (ref 35.9–50)
EST. AVERAGE GLUCOSE BLD GHB EST-MCNC: 123 MG/DL
FENTANYL UR QL: NEGATIVE
FIBRINOGEN PPP-MCNC: 642 MG/DL (ref 215–460)
GFR SERPLBLD CREATININE-BSD FMLA CKD-EPI: 101 ML/MIN/1.73 M 2
GFR SERPLBLD CREATININE-BSD FMLA CKD-EPI: 97 ML/MIN/1.73 M 2
GLOBULIN SER CALC-MCNC: 4 G/DL (ref 1.9–3.5)
GLUCOSE BLD STRIP.AUTO-MCNC: 204 MG/DL (ref 65–99)
GLUCOSE BLD STRIP.AUTO-MCNC: 93 MG/DL (ref 65–99)
GLUCOSE BLD STRIP.AUTO-MCNC: 96 MG/DL (ref 65–99)
GLUCOSE SERPL-MCNC: 122 MG/DL (ref 65–99)
GLUCOSE SERPL-MCNC: 234 MG/DL (ref 65–99)
HAV IGM SERPL QL IA: NORMAL
HBA1C MFR BLD: 5.9 % (ref 4–5.6)
HBV CORE IGM SER QL: NORMAL
HBV SURFACE AG SER QL: NORMAL
HCG UR QL: NEGATIVE
HCO3 BLDA-SCNC: 24.3 MMOL/L (ref 17–25)
HCO3 BLDA-SCNC: 25 MMOL/L (ref 17–25)
HCT VFR BLD AUTO: 35.9 % (ref 37–47)
HCT VFR BLD AUTO: 36.4 % (ref 37–47)
HCV AB SER QL: NORMAL
HGB BLD-MCNC: 11.9 G/DL (ref 12–16)
HGB BLD-MCNC: 12.4 G/DL (ref 12–16)
HOROWITZ INDEX BLDA+IHG-RTO: 316 MM[HG]
HOROWITZ INDEX BLDA+IHG-RTO: 390 MM[HG]
IMM GRANULOCYTES # BLD AUTO: 0.14 K/UL (ref 0–0.11)
IMM GRANULOCYTES NFR BLD AUTO: 0.6 % (ref 0–0.9)
INR PPP: 1.18 (ref 0.87–1.13)
LACTATE BLD-SCNC: 2.8 MMOL/L (ref 0.5–2)
LACTATE BLD-SCNC: 2.9 MMOL/L (ref 0.5–2)
LACTATE SERPL-SCNC: 1 MMOL/L (ref 0.5–2)
LACTATE SERPL-SCNC: 3.9 MMOL/L (ref 0.5–2)
LDH SERPL L TO P-CCNC: 284 U/L (ref 107–266)
LYMPHOCYTES # BLD AUTO: 0.53 K/UL (ref 1–4.8)
LYMPHOCYTES # BLD AUTO: 0.99 K/UL (ref 1–4.8)
LYMPHOCYTES NFR BLD: 2.1 % (ref 22–41)
LYMPHOCYTES NFR BLD: 4.4 % (ref 22–41)
MANUAL DIFF BLD: NORMAL
MCH RBC QN AUTO: 29.5 PG (ref 27–33)
MCH RBC QN AUTO: 30 PG (ref 27–33)
MCHC RBC AUTO-ENTMCNC: 33.1 G/DL (ref 32.2–35.5)
MCHC RBC AUTO-ENTMCNC: 34.1 G/DL (ref 32.2–35.5)
MCV RBC AUTO: 87.9 FL (ref 81.4–97.8)
MCV RBC AUTO: 89.1 FL (ref 81.4–97.8)
METHADONE UR QL SCN: NEGATIVE
MODE IMODE: ABNORMAL
MODE IMODE: ABNORMAL
MONOCYTES # BLD AUTO: 0.31 K/UL (ref 0–0.85)
MONOCYTES # BLD AUTO: 0.38 K/UL (ref 0–0.85)
MONOCYTES NFR BLD AUTO: 1.3 % (ref 0–13.4)
MONOCYTES NFR BLD AUTO: 1.7 % (ref 0–13.4)
MORPHOLOGY BLD-IMP: NORMAL
NEUTROPHILS # BLD AUTO: 21.13 K/UL (ref 1.82–7.42)
NEUTROPHILS # BLD AUTO: 23.6 K/UL (ref 1.82–7.42)
NEUTROPHILS NFR BLD: 93.9 % (ref 44–72)
NEUTROPHILS NFR BLD: 95.6 % (ref 44–72)
NRBC # BLD AUTO: 0 K/UL
NRBC # BLD AUTO: 0 K/UL
NRBC BLD-RTO: 0 /100 WBC (ref 0–0.2)
NRBC BLD-RTO: 0 /100 WBC (ref 0–0.2)
NT-PROBNP SERPL IA-MCNC: 264 PG/ML (ref 0–125)
O2/TOTAL GAS SETTING VFR VENT: 100 %
O2/TOTAL GAS SETTING VFR VENT: 50 %
OPIATES UR QL SCN: NEGATIVE
OXYCODONE UR QL SCN: NEGATIVE
PCO2 BLDA: 45.6 MMHG (ref 26–37)
PCO2 BLDA: 54.3 MMHG (ref 26–37)
PCO2 TEMP ADJ BLDA: 43.4 MMHG (ref 26–37)
PCP UR QL SCN: NEGATIVE
PEEP END EXPIRATORY PRESSURE IPEEP: 8 CMH20
PEEP END EXPIRATORY PRESSURE IPEEP: 8 CMH20
PH BLDA: 7.27 [PH] (ref 7.4–7.5)
PH BLDA: 7.33 [PH] (ref 7.4–7.5)
PH TEMP ADJ BLDA: 7.28 [PH] (ref 7.4–7.5)
PH TEMP ADJ BLDA: 7.35 [PH] (ref 7.4–7.5)
PLATELET # BLD AUTO: 418 K/UL (ref 164–446)
PLATELET # BLD AUTO: 430 K/UL (ref 164–446)
PLATELET BLD QL SMEAR: NORMAL
PMV BLD AUTO: 8.5 FL (ref 9–12.9)
PMV BLD AUTO: 8.5 FL (ref 9–12.9)
PO2 BLDA: 158 MMHG (ref 64–87)
PO2 BLDA: 390 MMHG (ref 64–87)
PO2 TEMP ADJ BLDA: 155 MMHG (ref 64–87)
PO2 TEMP ADJ BLDA: 384 MMHG (ref 64–87)
POTASSIUM SERPL-SCNC: 3.9 MMOL/L (ref 3.6–5.5)
POTASSIUM SERPL-SCNC: 4.7 MMOL/L (ref 3.6–5.5)
PROPOXYPH UR QL SCN: NEGATIVE
PROT SERPL-MCNC: 7.1 G/DL (ref 6–8.2)
PROT SERPL-MCNC: 8 G/DL (ref 6–8.2)
PROTHROMBIN TIME: 15.2 SEC (ref 12–14.6)
RBC # BLD AUTO: 4.03 M/UL (ref 4.2–5.4)
RBC # BLD AUTO: 4.14 M/UL (ref 4.2–5.4)
RBC BLD AUTO: NORMAL
SAO2 % BLDA: 100 % (ref 93–99)
SAO2 % BLDA: 99 % (ref 93–99)
SODIUM SERPL-SCNC: 134 MMOL/L (ref 135–145)
SODIUM SERPL-SCNC: 135 MMOL/L (ref 135–145)
SPECIMEN DRAWN FROM PATIENT: ABNORMAL
SPECIMEN DRAWN FROM PATIENT: ABNORMAL
T4 FREE SERPL-MCNC: 1.29 NG/DL (ref 0.93–1.7)
TIDAL VOLUME IVT: 360 ML
TIDAL VOLUME IVT: 360 ML
TROPONIN T SERPL-MCNC: <6 NG/L (ref 6–19)
TSH SERPL DL<=0.005 MIU/L-ACNC: 2.05 UIU/ML (ref 0.38–5.33)
URATE SERPL-MCNC: 2.6 MG/DL (ref 1.9–8.2)
WBC # BLD AUTO: 22.5 K/UL (ref 4.8–10.8)
WBC # BLD AUTO: 24.7 K/UL (ref 4.8–10.8)

## 2024-01-04 PROCEDURE — 700111 HCHG RX REV CODE 636 W/ 250 OVERRIDE (IP): Performed by: EMERGENCY MEDICINE

## 2024-01-04 PROCEDURE — 94640 AIRWAY INHALATION TREATMENT: CPT

## 2024-01-04 PROCEDURE — 87040 BLOOD CULTURE FOR BACTERIA: CPT

## 2024-01-04 PROCEDURE — 84484 ASSAY OF TROPONIN QUANT: CPT

## 2024-01-04 PROCEDURE — 84439 ASSAY OF FREE THYROXINE: CPT

## 2024-01-04 PROCEDURE — 92950 HEART/LUNG RESUSCITATION CPR: CPT

## 2024-01-04 PROCEDURE — 99291 CRITICAL CARE FIRST HOUR: CPT | Performed by: EMERGENCY MEDICINE

## 2024-01-04 PROCEDURE — 80048 BASIC METABOLIC PNL TOTAL CA: CPT

## 2024-01-04 PROCEDURE — 82962 GLUCOSE BLOOD TEST: CPT | Mod: 91

## 2024-01-04 PROCEDURE — 94002 VENT MGMT INPAT INIT DAY: CPT

## 2024-01-04 PROCEDURE — 700101 HCHG RX REV CODE 250: Performed by: STUDENT IN AN ORGANIZED HEALTH CARE EDUCATION/TRAINING PROGRAM

## 2024-01-04 PROCEDURE — 83036 HEMOGLOBIN GLYCOSYLATED A1C: CPT

## 2024-01-04 PROCEDURE — 99252 IP/OBS CONSLTJ NEW/EST SF 35: CPT | Performed by: SURGERY

## 2024-01-04 PROCEDURE — 36600 WITHDRAWAL OF ARTERIAL BLOOD: CPT

## 2024-01-04 PROCEDURE — 31500 INSERT EMERGENCY AIRWAY: CPT

## 2024-01-04 PROCEDURE — 93010 ELECTROCARDIOGRAM REPORT: CPT | Performed by: INTERNAL MEDICINE

## 2024-01-04 PROCEDURE — 82330 ASSAY OF CALCIUM: CPT

## 2024-01-04 PROCEDURE — 93005 ELECTROCARDIOGRAM TRACING: CPT | Performed by: EMERGENCY MEDICINE

## 2024-01-04 PROCEDURE — 82803 BLOOD GASES ANY COMBINATION: CPT

## 2024-01-04 PROCEDURE — 84550 ASSAY OF BLOOD/URIC ACID: CPT

## 2024-01-04 PROCEDURE — 94003 VENT MGMT INPAT SUBQ DAY: CPT

## 2024-01-04 PROCEDURE — 85610 PROTHROMBIN TIME: CPT

## 2024-01-04 PROCEDURE — A9270 NON-COVERED ITEM OR SERVICE: HCPCS | Performed by: EMERGENCY MEDICINE

## 2024-01-04 PROCEDURE — 80076 HEPATIC FUNCTION PANEL: CPT

## 2024-01-04 PROCEDURE — 96376 TX/PRO/DX INJ SAME DRUG ADON: CPT | Mod: XU

## 2024-01-04 PROCEDURE — 93005 ELECTROCARDIOGRAM TRACING: CPT | Mod: XU | Performed by: STUDENT IN AN ORGANIZED HEALTH CARE EDUCATION/TRAINING PROGRAM

## 2024-01-04 PROCEDURE — 71275 CT ANGIOGRAPHY CHEST: CPT

## 2024-01-04 PROCEDURE — 51702 INSERT TEMP BLADDER CATH: CPT | Mod: XU

## 2024-01-04 PROCEDURE — 700101 HCHG RX REV CODE 250: Performed by: EMERGENCY MEDICINE

## 2024-01-04 PROCEDURE — 303105 HCHG CATHETER EXTRA

## 2024-01-04 PROCEDURE — 5A1945Z RESPIRATORY VENTILATION, 24-96 CONSECUTIVE HOURS: ICD-10-PCS | Performed by: STUDENT IN AN ORGANIZED HEALTH CARE EDUCATION/TRAINING PROGRAM

## 2024-01-04 PROCEDURE — 83605 ASSAY OF LACTIC ACID: CPT

## 2024-01-04 PROCEDURE — 36415 COLL VENOUS BLD VENIPUNCTURE: CPT

## 2024-01-04 PROCEDURE — 85384 FIBRINOGEN ACTIVITY: CPT

## 2024-01-04 PROCEDURE — 84443 ASSAY THYROID STIM HORMONE: CPT

## 2024-01-04 PROCEDURE — 85730 THROMBOPLASTIN TIME PARTIAL: CPT

## 2024-01-04 PROCEDURE — 770022 HCHG ROOM/CARE - ICU (200)

## 2024-01-04 PROCEDURE — 700105 HCHG RX REV CODE 258: Performed by: EMERGENCY MEDICINE

## 2024-01-04 PROCEDURE — 82803 BLOOD GASES ANY COMBINATION: CPT | Mod: 91

## 2024-01-04 PROCEDURE — 700117 HCHG RX CONTRAST REV CODE 255: Performed by: STUDENT IN AN ORGANIZED HEALTH CARE EDUCATION/TRAINING PROGRAM

## 2024-01-04 PROCEDURE — 99255 IP/OBS CONSLTJ NEW/EST HI 80: CPT | Performed by: INTERNAL MEDICINE

## 2024-01-04 PROCEDURE — 85027 COMPLETE CBC AUTOMATED: CPT

## 2024-01-04 PROCEDURE — 94760 N-INVAS EAR/PLS OXIMETRY 1: CPT

## 2024-01-04 PROCEDURE — 36600 WITHDRAWAL OF ARTERIAL BLOOD: CPT | Mod: XU

## 2024-01-04 PROCEDURE — 71045 X-RAY EXAM CHEST 1 VIEW: CPT

## 2024-01-04 PROCEDURE — 94799 UNLISTED PULMONARY SVC/PX: CPT

## 2024-01-04 PROCEDURE — 306565 RIGID MIT RESTRAINT(PAIR): Performed by: EMERGENCY MEDICINE

## 2024-01-04 PROCEDURE — 700102 HCHG RX REV CODE 250 W/ 637 OVERRIDE(OP): Performed by: EMERGENCY MEDICINE

## 2024-01-04 PROCEDURE — 96366 THER/PROPH/DIAG IV INF ADDON: CPT | Mod: XU

## 2024-01-04 PROCEDURE — 83605 ASSAY OF LACTIC ACID: CPT | Mod: 91

## 2024-01-04 PROCEDURE — 80074 ACUTE HEPATITIS PANEL: CPT

## 2024-01-04 PROCEDURE — 96375 TX/PRO/DX INJ NEW DRUG ADDON: CPT | Mod: XU

## 2024-01-04 PROCEDURE — 83880 ASSAY OF NATRIURETIC PEPTIDE: CPT

## 2024-01-04 PROCEDURE — 96365 THER/PROPH/DIAG IV INF INIT: CPT | Mod: XU

## 2024-01-04 PROCEDURE — 700111 HCHG RX REV CODE 636 W/ 250 OVERRIDE (IP): Performed by: STUDENT IN AN ORGANIZED HEALTH CARE EDUCATION/TRAINING PROGRAM

## 2024-01-04 PROCEDURE — 700111 HCHG RX REV CODE 636 W/ 250 OVERRIDE (IP): Mod: JZ | Performed by: EMERGENCY MEDICINE

## 2024-01-04 PROCEDURE — 85007 BL SMEAR W/DIFF WBC COUNT: CPT

## 2024-01-04 PROCEDURE — 70491 CT SOFT TISSUE NECK W/DYE: CPT

## 2024-01-04 PROCEDURE — 83615 LACTATE (LD) (LDH) ENZYME: CPT

## 2024-01-04 PROCEDURE — 99292 CRITICAL CARE ADDL 30 MIN: CPT | Performed by: EMERGENCY MEDICINE

## 2024-01-04 RX ORDER — ROCURONIUM BROMIDE 10 MG/ML
70 INJECTION, SOLUTION INTRAVENOUS ONCE
Status: COMPLETED | OUTPATIENT
Start: 2024-01-04 | End: 2024-01-04

## 2024-01-04 RX ORDER — DIAZEPAM 5 MG/ML
10 INJECTION, SOLUTION INTRAMUSCULAR; INTRAVENOUS ONCE
Status: COMPLETED | OUTPATIENT
Start: 2024-01-04 | End: 2024-01-04

## 2024-01-04 RX ORDER — BISACODYL 10 MG
10 SUPPOSITORY, RECTAL RECTAL
Status: DISCONTINUED | OUTPATIENT
Start: 2024-01-04 | End: 2024-01-07

## 2024-01-04 RX ORDER — FAMOTIDINE 20 MG/1
20 TABLET, FILM COATED ORAL EVERY 12 HOURS
Status: DISCONTINUED | OUTPATIENT
Start: 2024-01-04 | End: 2024-01-08

## 2024-01-04 RX ORDER — AMOXICILLIN 250 MG
2 CAPSULE ORAL 2 TIMES DAILY
Status: DISCONTINUED | OUTPATIENT
Start: 2024-01-04 | End: 2024-01-04

## 2024-01-04 RX ORDER — CEFTRIAXONE 2 G/1
2000 INJECTION, POWDER, FOR SOLUTION INTRAMUSCULAR; INTRAVENOUS ONCE
Status: COMPLETED | OUTPATIENT
Start: 2024-01-04 | End: 2024-01-04

## 2024-01-04 RX ORDER — POLYETHYLENE GLYCOL 3350 17 G/17G
1 POWDER, FOR SOLUTION ORAL
Status: DISCONTINUED | OUTPATIENT
Start: 2024-01-04 | End: 2024-01-07

## 2024-01-04 RX ORDER — AZITHROMYCIN 500 MG/1
500 INJECTION, POWDER, LYOPHILIZED, FOR SOLUTION INTRAVENOUS ONCE
Status: COMPLETED | OUTPATIENT
Start: 2024-01-04 | End: 2024-01-04

## 2024-01-04 RX ORDER — DIPHENHYDRAMINE HYDROCHLORIDE 50 MG/ML
25 INJECTION INTRAMUSCULAR; INTRAVENOUS ONCE
Status: COMPLETED | OUTPATIENT
Start: 2024-01-04 | End: 2024-01-04

## 2024-01-04 RX ORDER — PROPOFOL 10 MG/ML
40 INJECTION, EMULSION INTRAVENOUS ONCE
Status: COMPLETED | OUTPATIENT
Start: 2024-01-04 | End: 2024-01-04

## 2024-01-04 RX ORDER — PROPOFOL 10 MG/ML
50 INJECTION, EMULSION INTRAVENOUS ONCE
Status: COMPLETED | OUTPATIENT
Start: 2024-01-04 | End: 2024-01-04

## 2024-01-04 RX ORDER — SODIUM CHLORIDE, SODIUM LACTATE, POTASSIUM CHLORIDE, AND CALCIUM CHLORIDE .6; .31; .03; .02 G/100ML; G/100ML; G/100ML; G/100ML
1000 INJECTION, SOLUTION INTRAVENOUS ONCE
Status: COMPLETED | OUTPATIENT
Start: 2024-01-04 | End: 2024-01-04

## 2024-01-04 RX ORDER — IPRATROPIUM BROMIDE AND ALBUTEROL SULFATE 2.5; .5 MG/3ML; MG/3ML
3 SOLUTION RESPIRATORY (INHALATION)
Status: COMPLETED | OUTPATIENT
Start: 2024-01-04 | End: 2024-01-04

## 2024-01-04 RX ORDER — ENOXAPARIN SODIUM 100 MG/ML
60 INJECTION SUBCUTANEOUS ONCE
Status: DISCONTINUED | OUTPATIENT
Start: 2024-01-04 | End: 2024-01-04 | Stop reason: HOSPADM

## 2024-01-04 RX ORDER — AMOXICILLIN 250 MG
2 CAPSULE ORAL 2 TIMES DAILY
Status: DISCONTINUED | OUTPATIENT
Start: 2024-01-04 | End: 2024-01-07

## 2024-01-04 RX ORDER — IPRATROPIUM BROMIDE AND ALBUTEROL SULFATE 2.5; .5 MG/3ML; MG/3ML
3 SOLUTION RESPIRATORY (INHALATION)
Status: DISCONTINUED | OUTPATIENT
Start: 2024-01-04 | End: 2024-01-09

## 2024-01-04 RX ORDER — POLYETHYLENE GLYCOL 3350 17 G/17G
1 POWDER, FOR SOLUTION ORAL
Status: DISCONTINUED | OUTPATIENT
Start: 2024-01-04 | End: 2024-01-04

## 2024-01-04 RX ORDER — EPINEPHRINE 1 MG/ML(1)
0.3 AMPUL (ML) INJECTION ONCE
Status: COMPLETED | OUTPATIENT
Start: 2024-01-04 | End: 2024-01-04

## 2024-01-04 RX ORDER — BISACODYL 10 MG
10 SUPPOSITORY, RECTAL RECTAL
Status: DISCONTINUED | OUTPATIENT
Start: 2024-01-04 | End: 2024-01-04

## 2024-01-04 RX ADMIN — DIAZEPAM 10 MG: 5 INJECTION, SOLUTION INTRAMUSCULAR; INTRAVENOUS at 03:20

## 2024-01-04 RX ADMIN — PROPOFOL 10 MCG/KG/MIN: 10 INJECTION, EMULSION INTRAVENOUS at 02:10

## 2024-01-04 RX ADMIN — PROPOFOL 40 MG: 10 INJECTION, EMULSION INTRAVENOUS at 02:50

## 2024-01-04 RX ADMIN — EPINEPHRINE 0.3 MG: 1 INJECTION INTRAMUSCULAR; INTRAVENOUS; SUBCUTANEOUS at 00:48

## 2024-01-04 RX ADMIN — PROPOFOL 70 MCG/KG/MIN: 10 INJECTION, EMULSION INTRAVENOUS at 19:40

## 2024-01-04 RX ADMIN — FENTANYL CITRATE 100 MCG: 50 INJECTION, SOLUTION INTRAMUSCULAR; INTRAVENOUS at 06:55

## 2024-01-04 RX ADMIN — ALBUTEROL SULFATE 5 MG: 2.5 SOLUTION RESPIRATORY (INHALATION) at 00:29

## 2024-01-04 RX ADMIN — PROPOFOL 50 MG: 10 INJECTION, EMULSION INTRAVENOUS at 05:08

## 2024-01-04 RX ADMIN — FENTANYL CITRATE 100 MCG: 50 INJECTION, SOLUTION INTRAMUSCULAR; INTRAVENOUS at 02:03

## 2024-01-04 RX ADMIN — IPRATROPIUM BROMIDE AND ALBUTEROL SULFATE 3 ML: .5; 3 SOLUTION RESPIRATORY (INHALATION) at 03:35

## 2024-01-04 RX ADMIN — Medication 200 MCG/HR: at 22:43

## 2024-01-04 RX ADMIN — DIPHENHYDRAMINE HYDROCHLORIDE 25 MG: 50 INJECTION, SOLUTION INTRAMUSCULAR; INTRAVENOUS at 00:51

## 2024-01-04 RX ADMIN — PROPOFOL 80 MCG/KG/MIN: 10 INJECTION, EMULSION INTRAVENOUS at 22:37

## 2024-01-04 RX ADMIN — KETAMINE HYDROCHLORIDE 120 MG: 50 INJECTION INTRAMUSCULAR; INTRAVENOUS at 01:57

## 2024-01-04 RX ADMIN — PROPOFOL 50 MG: 10 INJECTION, EMULSION INTRAVENOUS at 05:12

## 2024-01-04 RX ADMIN — SODIUM CHLORIDE, POTASSIUM CHLORIDE, SODIUM LACTATE AND CALCIUM CHLORIDE 1000 ML: 600; 310; 30; 20 INJECTION, SOLUTION INTRAVENOUS at 08:37

## 2024-01-04 RX ADMIN — DOCUSATE SODIUM 50 MG AND SENNOSIDES 8.6 MG 2 TABLET: 8.6; 5 TABLET, FILM COATED ORAL at 17:38

## 2024-01-04 RX ADMIN — PROPOFOL 55 MCG/KG/MIN: 10 INJECTION, EMULSION INTRAVENOUS at 14:59

## 2024-01-04 RX ADMIN — PROPOFOL 40 MG: 10 INJECTION, EMULSION INTRAVENOUS at 02:07

## 2024-01-04 RX ADMIN — FENTANYL CITRATE 100 MCG: 50 INJECTION, SOLUTION INTRAMUSCULAR; INTRAVENOUS at 03:07

## 2024-01-04 RX ADMIN — PROPOFOL 60 MCG/KG/MIN: 10 INJECTION, EMULSION INTRAVENOUS at 07:01

## 2024-01-04 RX ADMIN — FENTANYL CITRATE 100 MCG: 50 INJECTION, SOLUTION INTRAMUSCULAR; INTRAVENOUS at 07:30

## 2024-01-04 RX ADMIN — AZITHROMYCIN 500 MG: 500 INJECTION, POWDER, LYOPHILIZED, FOR SOLUTION INTRAVENOUS at 01:14

## 2024-01-04 RX ADMIN — IPRATROPIUM BROMIDE AND ALBUTEROL SULFATE 3 ML: 2.5; .5 SOLUTION RESPIRATORY (INHALATION) at 11:18

## 2024-01-04 RX ADMIN — FAMOTIDINE 20 MG: 10 INJECTION INTRAVENOUS at 17:37

## 2024-01-04 RX ADMIN — IPRATROPIUM BROMIDE AND ALBUTEROL SULFATE 3 ML: 2.5; .5 SOLUTION RESPIRATORY (INHALATION) at 22:14

## 2024-01-04 RX ADMIN — INSULIN HUMAN 3 UNITS: 100 INJECTION, SOLUTION PARENTERAL at 07:33

## 2024-01-04 RX ADMIN — ROCURONIUM BROMIDE 70 MG: 10 INJECTION, SOLUTION INTRAVENOUS at 01:59

## 2024-01-04 RX ADMIN — IPRATROPIUM BROMIDE AND ALBUTEROL SULFATE 3 ML: 2.5; .5 SOLUTION RESPIRATORY (INHALATION) at 18:25

## 2024-01-04 RX ADMIN — Medication 50 MCG/HR: at 03:37

## 2024-01-04 RX ADMIN — PROPOFOL 40 MCG/KG/MIN: 10 INJECTION, EMULSION INTRAVENOUS at 03:04

## 2024-01-04 RX ADMIN — Medication 100 MCG/HR: at 08:33

## 2024-01-04 RX ADMIN — IPRATROPIUM BROMIDE AND ALBUTEROL SULFATE 3 ML: 2.5; .5 SOLUTION RESPIRATORY (INHALATION) at 15:25

## 2024-01-04 RX ADMIN — PROPOFOL 55 MCG/KG/MIN: 10 INJECTION, EMULSION INTRAVENOUS at 09:20

## 2024-01-04 RX ADMIN — FENTANYL CITRATE 100 MCG: 50 INJECTION, SOLUTION INTRAMUSCULAR; INTRAVENOUS at 23:05

## 2024-01-04 RX ADMIN — CEFTRIAXONE SODIUM 2000 MG: 2 INJECTION, POWDER, FOR SOLUTION INTRAMUSCULAR; INTRAVENOUS at 01:13

## 2024-01-04 RX ADMIN — IOHEXOL 100 ML: 350 INJECTION, SOLUTION INTRAVENOUS at 02:55

## 2024-01-04 ASSESSMENT — PAIN DESCRIPTION - PAIN TYPE
TYPE: ACUTE PAIN

## 2024-01-04 NOTE — ED PROVIDER NOTES
ED Provider Note    CHIEF COMPLAINT  Chief Complaint   Patient presents with    Asthma     Patient presents with history of asthma. Rapid HR, tachypnea, and low blood pressure.        EXTERNAL RECORDS REVIEWED  Outpatient Notes emergency permit evaluation from 12/15/2023 where patient was evaluated for severe difficulty breathing treated for suspected allergic asthma exacerbation had significant improvement after Benadryl, Decadron, epinephrine and DuoNebs.    HPI/ROS  LIMITATION TO HISTORY     OUTSIDE HISTORIAN(S):  Parent patient's mother    Sol Jones is a 33 y.o. female who presents with shortness of breath.  Patient says that tonight she had relatively sudden onset shortness of breath, cough.  Patient says she has had frequent asthma exacerbations in the last few months.  Patient believes these are due to exposure to dust and garage.  Patient says that she recently finished course of antibiotics for enlarged lymph nodes but has not had further follow-up for these lymph nodes.  Patient denies drug or alcohol use.  Patient denies recent fever, chills, productive cough, vomiting or diarrhea.    PAST MEDICAL HISTORY   has a past medical history of ASTHMA, Ovarian cyst, and Psychiatric disorder.    SURGICAL HISTORY   has a past surgical history that includes gyn surgery and other.    FAMILY HISTORY  Family History   Problem Relation Age of Onset    Hypertension Mother        SOCIAL HISTORY  Social History     Tobacco Use    Smoking status: Some Days     Current packs/day: 0.50     Average packs/day: 0.5 packs/day for 5.0 years (2.5 ttl pk-yrs)     Types: Cigarettes    Smokeless tobacco: Never   Vaping Use    Vaping Use: Former   Substance and Sexual Activity    Alcohol use: Not Currently     Comment: very rare    Drug use: Not Currently     Types: Inhaled     Comment: marijuana    Sexual activity: Not on file       CURRENT MEDICATIONS  Home Medications       Reviewed by Chino Jhaveri R.N. (Registered  "Nurse) on 01/03/24 at 2044  Med List Status: Not Addressed     Medication Last Dose Status   albuterol (PROVENTIL) 2.5mg/3ml Nebu Soln solution for nebulization  Active   albuterol 108 (90 Base) MCG/ACT Aero Soln inhalation aerosol  Active   busPIRone (BUSPAR) 30 MG tablet  Active   famotidine (PEPCID) 40 MG Tab  Active   ipratropium-albuterol (COMBIVENT RESPIMAT)  MCG/ACT Aero Soln  Active   ipratropium-albuterol (DUONEB) 0.5-2.5 (3) MG/3ML nebulizer solution  Active   ipratropium-albuterol (DUONEB) 0.5-2.5 (3) MG/3ML nebulizer solution  Active   lamotrigine (LAMICTAL) 200 MG tablet  Active   propranolol (INDERAL) 40 MG Tab  Active                    ALLERGIES  No Known Allergies    PHYSICAL EXAM  VITAL SIGNS: /60   Pulse (!) 146   Temp 37.7 °C (99.9 °F) (Temporal)   Resp (!) 22   Ht 1.626 m (5' 4\")   Wt 59.4 kg (130 lb 15.3 oz)   SpO2 99%   BMI 22.48 kg/m²    Constitutional: Anxious  HENT: No signs of trauma, Bilateral external ears normal, Nose normal.   Eyes: Pupils are equal and reactive, Conjunctiva normal, Non-icteric.   Neck: Normal range of motion, No tenderness, Supple, No stridor.   Lymphatic: Prominent lymphadenopathy across left supraclavicular area and left axilla  Cardiovascular: Tachycardia Thorax & Lungs: Bilateral wheezing, tachypnea, accessory muscle use  Abdomen: Bowel sounds normal, Soft, No tenderness, No masses, No pulsatile masses.   Skin: Warm, Dry, No erythema, No rash.   Back: No bony tenderness, No CVA tenderness.   Extremities: Intact distal pulses, No edema, No tenderness, No cyanosis  Musculoskeletal: Good range of motion in all major joints. No tenderness to palpation or major deformities noted.   Neurologic: Alert , Normal motor function, Normal sensory function, No focal deficits noted.       DIAGNOSTIC STUDIES / PROCEDURES  EKG      LABS  Labs Reviewed   URINE DRUG SCREEN - Abnormal; Notable for the following components:       Result Value    Cannabinoid Metab " "Positive (*)     All other components within normal limits   CBC WITH DIFFERENTIAL - Abnormal; Notable for the following components:    WBC 24.7 (*)     RBC 4.14 (*)     Hematocrit 36.4 (*)     MPV 8.5 (*)     Neutrophils-Polys 95.60 (*)     Lymphocytes 2.10 (*)     Neutrophils (Absolute) 23.60 (*)     Lymphs (Absolute) 0.53 (*)     Immature Granulocytes (abs) 0.14 (*)     All other components within normal limits   COMP METABOLIC PANEL - Abnormal; Notable for the following components:    Sodium 134 (*)     Glucose 122 (*)     Alkaline Phosphatase 105 (*)     Globulin 4.0 (*)     All other components within normal limits   LACTIC ACID - Abnormal; Notable for the following components:    Lactic Acid 3.9 (*)     All other components within normal limits   COV-2, FLU A/B, AND RSV BY PCR ([place])   HCG QUALITATIVE UR   ESTIMATED GFR   BLOOD CULTURE    Narrative:     1 of 2 for Blood Culture x 2 sites order. Per Hospital  Policy: Only change Specimen Src: to \"Line\" if specified by  physician order.   BLOOD CULTURE    Narrative:     2 of 2 blood culture x2  Sites order. Per Hospital Policy:  Only change Specimen Src: to \"Line\" if specified by physician  order.   TROPONIN   PROBRAIN NATRIURETIC PEPTIDE, NT         RADIOLOGY  I have independently interpreted the diagnostic imaging associated with this visit and am waiting the final reading from the radiologist.   My preliminary interpretation is as follows: No pneumothorax  Radiologist interpretation:   DX-CHEST-PORTABLE (1 VIEW)   Final Result      1.  Medial right lower lung opacity concerning for pneumonia.      CT-CTA CHEST PULMONARY ARTERY W/ RECONS    (Results Pending)   CT-SOFT TISSUE NECK WITH    (Results Pending)   DX-CHEST-PORTABLE (1 VIEW)    (Results Pending)         COURSE & MEDICAL DECISION MAKING    ED Observation Status?     INITIAL ASSESSMENT, COURSE AND PLAN  Care Narrative: Initial evaluation patient with significant anxiety, noted to have bilateral " wheezing,  muscle use no stridor speaking full sentences.  Initial suspicion for asthma exacerbation patient without urticaria did have transient hypotension which on multiple repeats was normal range lower suspicion for anaphylaxis.  Patient did require frequent redirection in order to facilitate bronchodilator therapy.  During this time IV access was established with IV fluid bolus, steroids and magnesium initiated.  Patient was given low-dose ketamine and in order to help facilitate bronchodilator treatments.  Patient with minimal improvement and anxiety and restlessness after ketamine.  Patient given dose of Versed with significant improvement in work of breathing.  Patient subsequently developed progressive dyspnea and recurrent wheezing given dose of epinephrine in the event that there is some allergic component as well as to help facilitate treatment of suspected asthma exacerbation.  Obtained chest x-ray which showed no pneumothorax possible opacity in right lower lung.  Patient dosed with antihistamines as well as further anxiolytics.  Patient given antibiotics for treatment of possible pneumonia.  Blood work notable for leukocytosis elevated lactic acid.  Plan for admission for ongoing management of suspected asthma exacerbation with plan for CT imaging of neck and chest given patient's significant lymphadenopathy.  Despite 2 doses of epinephrine multiple bronchodilators, antihistamines, steroids patient had progression of dyspnea ongoing hypoxemia and tachycardia.  Given patient's significant lymphadenopathy and progressive symptoms despite treatment consideration of PE, obstructive malignancy including possible impingement on the airways.  Patient not able to sit comfortably supine in bed fixed and upright position.  Patient developed intermittent stridor.  At this point my concern was patient having progressive upper airway or intrathoracic obstruction and decision was made to intubate.  This  was discussed with the patient and her mother at bedside.  Patient and mother understood risk and benefits and were comfortable with proceeding.  Patient was intubated on first pass.  Initiated sedatives with plan for CT imaging and ICU admission.  Signed out to oncoming ERP with plan for admission to ICU pending results of CT imaging.    Intubation Procedure Note    Indication: Respiratory failure and impending airway compromise    Consent: The patient provided verbal consent for this procedure.    Medications Used: None    Procedure: The patient was placed in the appropriate position.  Cricoid pressure was not required. glidescope was used. Intubation was performed by video laryngoscopy, using a glidescope a 7.5 cuffed endotracheal tube.  The cuff was then inflated and the tube was secured appropriately at a distance of 23 cm to the dental ridge.  Initial confirmation of placement included bilateral breath sounds, an end tidal CO2 detector, absence of sounds over the stomach, tube fogging, adequate chest rise, and adequate pulse oximetry reading.  A chest x-ray to verify correct placement of the tube showed appropriate tube position.    The patient tolerated the procedure well.     Complications:   None     CRITICAL CARE  The very real possibilty of a deterioration of this patient's condition required the highest level of my preparedness for sudden, emergent intervention.  I provided critical care services, which included medication orders, frequent reevaluations of the patient's condition and response to treatment, ordering and reviewing test results, and discussing the case with various consultants.  The critical care time associated with the care of the patient was 60 minutes. Review chart for interventions. This time is exclusive of any other billable procedures.    HYDRATION: Based on the patient's presentation of Dehydration the patient was given IV fluids. IV Hydration was used because oral hydration was  not adequate alone. Upon recheck following hydration, the patient was now improved.      ADDITIONAL PROBLEM LIST    DISPOSITION AND DISCUSSIONS    FINAL DIAGNOSIS  1. Acute respiratory failure with hypoxemia (HCC)           Electronically signed by: Ramu Dyson D.O., 1/3/2024 8:47 PM

## 2024-01-04 NOTE — ED NOTES
ERP at BS for re-eval. Pt. Refusing to stay in bed, laying on floor, continues to be combative with staff. Orders for versed.

## 2024-01-04 NOTE — ED NOTES
Erp discussed to pt the need to intubate the pt to do the breathing treatment, Ct Scan. Pt agreed

## 2024-01-04 NOTE — PROGRESS NOTES
[facility] INTENSIVIST DIRECT ADMISSION REPORT    Transferring facility: [facility]    Chief complaint: Acute respiratory failure with hypoxia    Pertinent history & patient course: 33-year-old female with a history of asthma and reported history of lymphoma (cannot find anything in the chart about this but was told over the phone), has had frequent bouts of dyspnea over the past month which were attributed to dust and asthma exacerbations.  Presented to the emergency department last night at [facility] complaining of dyspnea and over the course of the night had increasingly worsening symptoms to the point where she was eventually intubated.    Of concern were is wheezing and stridor with a chest x-ray that showed widening of her mediastinum.  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  Since intubation her wheezing has resolved along with a DuoNeb.  Peak pressures on the ventilator are 22-24.  She is on propofol but has required some boluses for comfort and so a fentanyl drip will be added.    Possibly has bilateral upper lobe pulmonary emboli though there is no evidence of right heart strain.  Given clinical ambiguity and likely malignancy I asked ERP to give her 1 mg/kg of Lovenox which will help anticoagulate her well we work out a plan for her mass.    Will move her to [facility] to better coordinate with oncology and help arrange for biopsy.      Pertinent imaging & lab results: as above      Code Status: Full per transferring provider, I personally verified with the transferring provider patient's code status and the transferring provider has confirmed this with the patient.    Further work up or recommendations prior to transfer: lovenox 1mg/kg, start fentanyl for comfort    Consultants called prior to transfer and pertinent input from consultants: none    Patient accepted for transfer:  yes  Consultants to be called upon arrival: pulm/ir for biopsy, oncology   Floor requested: ICU  ADT order placed for accepted patient: yes    Please inform the Intensivist upon assignment of an ICU bed and then again on arrival of the patient.

## 2024-01-04 NOTE — PROGRESS NOTES
ED Observation Progress Note    Date of Service: 01/04/24    Interval History and Interventions  33-year-old female with history of asthma presents for evaluation of acute dyspnea.  Presentation is concerning for septicemia, she is tripoding and stridorous on my initial assessment and given concern for the patient's airway she is intubated.    0325: Patient starting to wake up from sedation.  I ordered additional propofol 50 mL bolus, awaiting her fentanyl drip.  CT is reviewed by myself, awaiting formal read.    0333: Reviewed ABG, pCO2 is in 54 and she is acidotic, pH is 7.27.  This is consistent with respiratory acidosis, will increase her respiratory rate.    0445: I have heard back from the radiologist .  Concern for lymphoproliferative disorder, questionable pulmonary emboli but thankfully no evidence of right heart strain.  I have updated the hospitalist Dr. Larose, he and I concur.  Patient will require higher level of care given the concerning CT findings consistent with neoplastic etiology.  Have this the paged transfer center for admission to ICU at [facility].    0501: I have updated the patient's mother as well is over telephone her sister who is a nurse with regard to concerning findings and need for ICU admission to [facility].  They concur with plan for transfer.    0524: I have consulted with the intensivist Dr. Byrne.  He agrees patient would benefit from anticoagulation with regard to pulmonary embolism.  He recommends Lovenox 1 mg/kg.  I have ordered this.  Plan is for transfer for admission to [facility] ICU by critical care transport.    CRITICAL CARE  The very real possibilty of a deterioration of this patient's condition required the highest level of my preparedness for sudden, emergent intervention.  I provided critical care services, which included medication orders, frequent reevaluations of the patient's condition and response to treatment,  ordering and reviewing test results, and discussing the case with various consultants.  The critical care time associated with the care of the patient was 157 minutes. Review chart for interventions. This time is exclusive of any other billable procedures.      Patient Vitals for the past 24 hrs:   BP Temp Temp src Pulse Resp SpO2 Height Weight   01/04/24 0520 113/55 -- -- (!) 111 (!) 21 98 % -- --   01/04/24 0515 103/48 -- -- (!) 110 (!) 27 98 % -- --   01/04/24 0512 106/54 -- -- (!) 108 -- 98 % -- --   01/04/24 0415 121/63 -- -- (!) 112 14 98 % -- --   01/04/24 0404 -- -- -- (!) 113 20 97 % -- --   01/04/24 0400 123/50 -- -- (!) 112 (!) 21 97 % -- --   01/04/24 0351 117/48 -- -- (!) 113 (!) 22 97 % -- --   01/04/24 0345 108/47 -- -- (!) 111 (!) 21 97 % -- --   01/04/24 0330 102/47 -- -- (!) 111 19 98 % -- --   01/04/24 0324 (!) 156/79 -- -- (!) 114 -- 98 % -- --   01/04/24 0315 135/62 -- -- (!) 110 (!) 24 99 % -- --   01/04/24 0310 127/63 -- -- (!) 109 (!) 24 99 % -- --   01/04/24 0300 (!) 176/105 -- -- (!) 122 (!) 73 98 % -- --   01/04/24 0215 -- -- -- (!) 122 (!) 54 99 % -- --   01/04/24 0210 (!) 190/85 -- -- (!) 145 (!) 99 99 % -- --   01/04/24 0205 -- -- -- (!) 146 (!) 22 99 % -- --   01/04/24 0200 (!) 167/79 -- -- (!) 141 (!) 50 99 % -- --   01/04/24 0029 -- -- -- (!) 120 (!) 38 93 % -- --   01/04/24 0017 119/60 -- -- (!) 125 -- 93 % -- --   01/03/24 2358 113/60 -- -- (!) 127 (!) 27 93 % -- --   01/03/24 2316 90/41 -- -- (!) 128 -- 94 % -- --   01/03/24 2252 119/49 -- -- (!) 136 (!) 24 94 % -- --   01/03/24 2245 -- -- -- (!) 143 -- 92 % -- --   01/03/24 2238 -- -- -- (!) 133 13 93 % -- --   01/03/24 2203 -- -- -- (!) 141 (!) 24 91 % -- --   01/03/24 2142 -- -- -- (!) 159 -- 94 % -- --   01/03/24 2133 (!) 141/60 -- -- -- -- 88 % -- --   01/03/24 2105 -- -- -- (!) 155 -- 94 % -- --   01/03/24 2103 111/69 -- -- (!) 139 (!) 44 92 % -- --   01/03/24 2041 (!) 86/63 37.7 °C (99.9 °F) Temporal (!) 135 (!) 26 90 %  "1.626 m (5' 4\") 59.4 kg (130 lb 15.3 oz)      HYDRATION: Based on the patient's presentation of Hypotension, Sepsis, and Tachycardia the patient was given IV fluids. IV Hydration was used because oral hydration was not as rapid as required. Upon recheck following hydration, the patient was doing somewhat better, hypotension resolved, current blood pressure is 113/55.     Physical Exam  /55   Pulse (!) 111   Temp 37.7 °C (99.9 °F) (Temporal)   Resp (!) 21   Ht 1.626 m (5' 4\")   Wt 59.4 kg (130 lb 15.3 oz)   SpO2 98%   BMI 22.48 kg/m² .    Constitutional: Awake and anxious in severe acute distress  HENT: Left axillary left cervical lymphadenopathy, stridorous respirations  Eyes: Grossly normal  Neck: Pain and swelling on the left  Cardiovascular:  sinus tachycardia  Thorax & Lungs: Severe respiratory distress with historian expiratory wheezing, symmetric chest rise  Abdomen: Nontender  Skin:  No pathologic rash.   Extremities: Pale  Psychiatric: Extremely anxious    EKG Interpretation    Interpreted by emergency department physician    Rhythm: sinus tachycardia  Rate: 110  Axis: Normal  Ectopy: none  Conduction: normal  ST Segments: no acute change  T Waves: no acute change  Q Waves: none    Clinical Impression: no acute changes     Labs  Results for orders placed or performed during the hospital encounter of 01/03/24   CoV-2, FLU A/B, and RSV by PCR (2-4 Hours CEPHEID) : Collect NP swab in VTM    Specimen: Nasopharyngeal; Respirate   Result Value Ref Range    Influenza virus A RNA Negative Negative    Influenza virus B, PCR Negative Negative    RSV, PCR Negative Negative    SARS-CoV-2 by PCR NotDetected     SARS-CoV-2 Source NP Swab    URINE DRUG SCREEN   Result Value Ref Range    Amphetamines Urine Negative Negative    Barbiturates Negative Negative    Benzodiazepines Negative Negative    Cocaine Metabolite Negative Negative    Fentanyl, Urine Negative Negative    Methadone Negative Negative    Opiates " Negative Negative    Oxycodone Negative Negative    Phencyclidine -Pcp Negative Negative    Propoxyphene Negative Negative    Cannabinoid Metab Positive (A) Negative   CBC WITH DIFFERENTIAL   Result Value Ref Range    WBC 24.7 (H) 4.8 - 10.8 K/uL    RBC 4.14 (L) 4.20 - 5.40 M/uL    Hemoglobin 12.4 12.0 - 16.0 g/dL    Hematocrit 36.4 (L) 37.0 - 47.0 %    MCV 87.9 81.4 - 97.8 fL    MCH 30.0 27.0 - 33.0 pg    MCHC 34.1 32.2 - 35.5 g/dL    RDW 44.6 35.9 - 50.0 fL    Platelet Count 430 164 - 446 K/uL    MPV 8.5 (L) 9.0 - 12.9 fL    Neutrophils-Polys 95.60 (H) 44.00 - 72.00 %    Lymphocytes 2.10 (L) 22.00 - 41.00 %    Monocytes 1.30 0.00 - 13.40 %    Eosinophils 0.20 0.00 - 6.90 %    Basophils 0.20 0.00 - 1.80 %    Immature Granulocytes 0.60 0.00 - 0.90 %    Nucleated RBC 0.00 0.00 - 0.20 /100 WBC    Neutrophils (Absolute) 23.60 (H) 1.82 - 7.42 K/uL    Lymphs (Absolute) 0.53 (L) 1.00 - 4.80 K/uL    Monos (Absolute) 0.31 0.00 - 0.85 K/uL    Eos (Absolute) 0.05 0.00 - 0.51 K/uL    Baso (Absolute) 0.06 0.00 - 0.12 K/uL    Immature Granulocytes (abs) 0.14 (H) 0.00 - 0.11 K/uL    NRBC (Absolute) 0.00 K/uL   COMP METABOLIC PANEL   Result Value Ref Range    Sodium 134 (L) 135 - 145 mmol/L    Potassium 3.9 3.6 - 5.5 mmol/L    Chloride 96 96 - 112 mmol/L    Co2 23 20 - 33 mmol/L    Anion Gap 15.0 7.0 - 16.0    Glucose 122 (H) 65 - 99 mg/dL    Bun 12 8 - 22 mg/dL    Creatinine 0.82 0.50 - 1.40 mg/dL    Calcium 9.4 8.4 - 10.2 mg/dL    Correct Calcium 9.4 8.5 - 10.5 mg/dL    AST(SGOT) 26 12 - 45 U/L    ALT(SGPT) 19 2 - 50 U/L    Alkaline Phosphatase 105 (H) 30 - 99 U/L    Total Bilirubin 0.2 0.1 - 1.5 mg/dL    Albumin 4.0 3.2 - 4.9 g/dL    Total Protein 8.0 6.0 - 8.2 g/dL    Globulin 4.0 (H) 1.9 - 3.5 g/dL    A-G Ratio 1.0 g/dL   LACTIC ACID   Result Value Ref Range    Lactic Acid 3.9 (H) 0.5 - 2.0 mmol/L   HCG QUALITATIVE UR (Lab)   Result Value Ref Range    Beta-Hcg Urine Negative Negative   ESTIMATED GFR   Result Value Ref Range     GFR (CKD-EPI) 97 >60 mL/min/1.73 m 2   TROPONIN   Result Value Ref Range    Troponin T <6 6 - 19 ng/L   proBrain Natriuretic Peptide, NT   Result Value Ref Range    NT-proBNP 264 (H) 0 - 125 pg/mL   EKG (NOW)   Result Value Ref Range    Report       [facility] Emergency Dept.    Test Date:  2024  Pt Name:    VI DE LOS SANTOS              Department: [facility]  MRN:        [id number]                      [facility]  Gender:     Female                       Technician: [id number]  :        1990                   Requested By:BRUNILDA QUAN  Order #:    [id number]                    Reading MD: GABINO AHN MD    Measurements  Intervals                                Axis  Rate:       110                          P:          76  IA:         131                          QRS:        77  QRSD:       85                           T:          40  QT:         350  QTc:        474    Interpretive Statements  Sinus tachycardia  Probable left atrial enlargement  RSR' in V1 or V2, probably normal variant  Compared to ECG 2023 19:02:16  RSR' in V1 or V2 now present  Sinus rhythm no longer present  Electronically Signed On 2024 05:12:20 PST by GABINO AHN MD     POCT arterial blood gas device results   Result Value Ref Range    Ph 7.271 (LL) 7.400 - 7.500    Pco2 54.3 (HH) 26.0 - 37.0 mmHg    Po2 158 (H) 64 - 87 mmHg    Tco2 27 20 - 33 mmol/L    S02 99 93 - 99 %    Hco3 25.0 17.0 - 25.0 mmol/L    BE -3 -4 - 3 mmol/L    Body Temp 97.4 F degrees    O2 Therapy 50 %    iPF Ratio 316     Ph Temp Jorge L 7.280 (LL) 7.400 - 7.500    Po2 Temp Cor 155 (H) 64 - 87 mmHg    Specimen Arterial     Ramiro Test Pass     DelSys Vent     Tidal Volume 360 mL    Peep End Expiratory Pressure 8 cmh20    Set Rate 22     Mode APV-CMV    POCT lactate device results   Result Value Ref Range    iStat Lactate 2.9 (H) 0.5 - 2.0 mmol/L       Radiology  CT-CTA CHEST PULMONARY ARTERY W/  RECONS   Final Result      1.  Questionable acute pulmonary emboli in bilateral upper lobe segmental arteries. No evidence of right heart strain.   2.  Right paratracheal mass with extensive multistation mediastinal lymphadenopathy. The mass does represent a lymph node. Findings are highly concerning for lymphoproliferative disorder. Other neoplastic etiologies are possible.   3.  Mass effect on the left brachiocephalic vein and SVC.   4.  Right middle lobe and lingular consolidations, concerning for pneumonia.   5.  Scattered groundglass densities throughout the bilateral lungs, nonspecific. These may be infectious or inflammatory, related to edema, or neoplastic.               Findings conveyed to Dr. Matta at 1/4/2024 4:34 AM via Voalte messaging.      Study unavailable for interpretation until 1/4/2024 4:34 AM due to unknown issue with hospital PACS system.      CT-SOFT TISSUE NECK WITH   Final Result      1.  Left greater than right cervical lymphadenopathy, highly concerning for lymphoproliferative disorder, particularly given findings in the chest.   2.  Posterior disc osteophyte complex at C5-C6 with at least moderate spinal canal narrowing.   3.  See evaluation of the chest on dedicated imaging.      Study unavailable for interpretation until 1/4/2024 4:19 AM due to unknown issue with hospital PACS system.      DX-CHEST-PORTABLE (1 VIEW)   Final Result      1.  Medial right lower lung opacity concerning for pneumonia.      DX-CHEST-FOR LINE PLACEMENT Perform procedure in: Patient's Room    (Results Pending)           Problem List  1.   1. Acute respiratory failure with hypoxemia (HCC)        2. Sepsis with acute hypercapnic respiratory failure and septic shock, due to unspecified organism (HCC)        3. Respiratory acidosis        4. Mass in neck        5. Community acquired pneumonia, unspecified laterality        6. Acute pulmonary embolism without acute cor pulmonale, unspecified pulmonary embolism  type (HCC)             Electronically signed by: Jean Matta M.D., 1/4/2024 3:25 AM

## 2024-01-04 NOTE — ED NOTES
0157- Ketamine 120 mg given IV  0159- Rocuronium 70 mg given IV  0200- ETT placed 7.5, 22@lip   0203-Fentanyl 100 mcg IV push   0207- Propofol 40 mg IV push   0210- Propofol drip started at 10mcg  0225- Shifted to CT Scan, accompanied by this RN and RT

## 2024-01-04 NOTE — ED TRIAGE NOTES
"Patient presents to the ER with the following complaints:    Chief Complaint   Patient presents with    Asthma     Patient presents with history of asthma. Rapid HR, tachypnea, and low blood pressure.        BP (!) 86/63   Pulse (!) 135   Temp 37.7 °C (99.9 °F) (Temporal)   Resp (!) 26   Ht 1.626 m (5' 4\")   Wt 59.4 kg (130 lb 15.3 oz)   SpO2 90%   BMI 22.48 kg/m²       "

## 2024-01-04 NOTE — ED NOTES
Pt refused to finish the nebulization, Erp aware. Pt asking another dose of benadryl as it relaxes her after the meds.

## 2024-01-04 NOTE — RESPIRATORY CARE
Started pt. With SVN with mask.  She didn't tolerate the mask.  Switched to a mouthpiece SVN.  She was intermittent with the mouthpiece breathing.  Very anxious about every thing.  Hard for her to stay still.  Nurse and  At bedside.   Yes

## 2024-01-04 NOTE — RESPIRATORY CARE
Pt. Started SVN treatment and complained that it made her breathing worse.  Treatment stopped land nurse and  Notified.

## 2024-01-04 NOTE — CONSULTS
Critical Care Consultation    Date of consult: 1/4/2024    Referring Physician  [place]    Reason for Consultation  Respiratory failure, mediastinal mass    History of Presenting Illness  33 y.o. female who presented 1/4/2024 with a past medical history of anxiety, asthma, and a history of progressive lymphadenopathy over the last several months to the ED yesterday with acute dyspnea which she described as to the emergency physician as being fairly sudden onset, the patient has had multiple ED visits for respiratory illnesses and exacerbations related to inhalations and exposures and has had increasing episodes over the last month or 2.  In the ED she was reported as having bilateral wheezing tachypnea and accessory muscle use and tripoding, and was treated with bronchodilators, steroids and magnesium and low-dose ketamine.  She was then given some Versed. She was over the course of her ED stay she continued to worsen and have recurrent wheezing and was given epinephrine antihistamines as well as further anxiolytics.  She was also treated with antibiotics for possible pneumonia.  Ultimately she developed some hypoxemia and worsening respiratory distress and being unable to sit upright in and maintain her own airway and with reported intermittent stridor she was intubated.    He had a CT scan of her chest and soft tissue of her neck which was concerning for paratracheal mass, lymphadenopathy in her cervical and mediastinal and axillary region, and was transferred to [facility] [place], [place] for further workup.    At arrival here she is intubated sedated on mechanical ventilation.    Code Status  Full Code    Review of Systems  Review of Systems   Unable to perform ROS: Critical illness       Past Medical History   has a past medical history of ASTHMA, Ovarian cyst, and Psychiatric disorder.    Surgical History   has a past surgical history that includes gyn surgery and other.    Family History  family history includes Hypertension  in her mother.    Social History   reports that she has been smoking cigarettes. She has a 2.5 pack-year smoking history. She has never used smokeless tobacco. She reports that she does not currently use alcohol. She reports that she does not currently use drugs after having used the following drugs: Inhaled.    Medications  Home Medications    **Home medications have not yet been reviewed for this encounter**       Current Facility-Administered Medications   Medication Dose Route Frequency Provider Last Rate Last Admin    senna-docusate (Pericolace Or Senokot S) 8.6-50 MG per tablet 2 Tablet  2 Tablet Oral BID Murray Morris M.D.   2 Tablet at 01/04/24 1738    And    polyethylene glycol/lytes (Miralax) Packet 1 Packet  1 Packet Oral QDAY PRN Murray Morris M.D.        And    magnesium hydroxide (Milk Of Magnesia) suspension 30 mL  30 mL Oral QDAY PRN Murray Morris M.D.        And    bisacodyl (Dulcolax) suppository 10 mg  10 mg Rectal QDAY PRN Murray Morris M.D.        insulin regular (HumuLIN R,NovoLIN R) injection  2-9 Units Subcutaneous Q6HRS Murray Morris M.D.   3 Units at 01/04/24 0733    And    dextrose 10 % BOLUS 25 g  25 g Intravenous Q15 MIN PRN Murray Morris M.D.        Respiratory Therapy Consult   Nebulization Continuous RT Murray Morris M.D.        famotidine (Pepcid) tablet 20 mg  20 mg Enteral Tube Q12HRS Murray Morris M.D.        Or    famotidine (Pepcid) injection 20 mg  20 mg Intravenous Q12HRS Murray Morris M.D.   20 mg at 01/04/24 1737    MD Alert...ICU Electrolyte Replacement per Pharmacy   Other PHARMACY TO DOSE Murray Morris M.D.        lidocaine (Xylocaine) 1 % injection 2 mL  2 mL Tracheal Tube Q30 MIN PRN Murray Morris M.D.        fentaNYL (Sublimaze) injection 100 mcg  100 mcg Intravenous Q15 MIN PRN Murray Morris M.D.   100 mcg at 01/04/24 0730    And    fentaNYL (Sublimaze) injection 200 mcg  200 mcg Intravenous Q15 MIN PRN  Murray Morris M.D.        And    fentaNYL (SUBLIMAZE) 50 mcg/mL in 50mL (Continuous Infusion)   Intravenous Continuous Murray Morris M.D. 2 mL/hr at 01/04/24 0833 100 mcg/hr at 01/04/24 0833    And    propofol (DIPRIVAN) injection  0-80 mcg/kg/min (Ideal) Intravenous Continuous Murray Morris M.D. 18.1 mL/hr at 01/04/24 1459 55 mcg/kg/min at 01/04/24 1459    ipratropium-albuterol (DUONEB) nebulizer solution  3 mL Nebulization Q4HRS (RT) Murray Morris M.D.   3 mL at 01/04/24 1825       Allergies  No Known Allergies    Vital Signs last 24 hours  Pulse:  [] 93  Resp:  [11-22] 17  BP: ()/(52-66) 91/53  SpO2:  [97 %-100 %] 97 %    Physical Exam  Physical Exam  Constitutional:       General: She is not in acute distress.     Appearance: She is ill-appearing.   HENT:      Head: Normocephalic.      Mouth/Throat:      Mouth: Mucous membranes are moist.   Eyes:      Extraocular Movements: Extraocular movements intact.      Pupils: Pupils are equal, round, and reactive to light.   Cardiovascular:      Rate and Rhythm: Regular rhythm. Tachycardia present.      Heart sounds: No murmur heard.  Pulmonary:      Effort: Respiratory distress present.      Breath sounds: No wheezing.      Comments: He does have a single wheezing in the center of her chest which is associated with Ventilator cycle, she does not have diffuse wheezing in her distal airways, she has good airflow no obstructive physiology on the ventilator  Musculoskeletal:      Right lower leg: No edema.      Left lower leg: No edema.   Lymphadenopathy:      Cervical: Cervical adenopathy present.   Skin:     General: Skin is warm.      Capillary Refill: Capillary refill takes less than 2 seconds.   Neurological:      General: No focal deficit present.         Fluids    Intake/Output Summary (Last 24 hours) at 1/4/2024 1837  Last data filed at 1/4/2024 1600  Gross per 24 hour   Intake 1235.44 ml   Output 300 ml   Net 935.44 ml        Laboratory  Recent Results (from the past 48 hour(s))   CBC WITH DIFFERENTIAL    Collection Time: 01/03/24 12:30 AM   Result Value Ref Range    WBC 24.7 (H) 4.8 - 10.8 K/uL    RBC 4.14 (L) 4.20 - 5.40 M/uL    Hemoglobin 12.4 12.0 - 16.0 g/dL    Hematocrit 36.4 (L) 37.0 - 47.0 %    MCV 87.9 81.4 - 97.8 fL    MCH 30.0 27.0 - 33.0 pg    MCHC 34.1 32.2 - 35.5 g/dL    RDW 44.6 35.9 - 50.0 fL    Platelet Count 430 164 - 446 K/uL    MPV 8.5 (L) 9.0 - 12.9 fL    Neutrophils-Polys 95.60 (H) 44.00 - 72.00 %    Lymphocytes 2.10 (L) 22.00 - 41.00 %    Monocytes 1.30 0.00 - 13.40 %    Eosinophils 0.20 0.00 - 6.90 %    Basophils 0.20 0.00 - 1.80 %    Immature Granulocytes 0.60 0.00 - 0.90 %    Nucleated RBC 0.00 0.00 - 0.20 /100 WBC    Neutrophils (Absolute) 23.60 (H) 1.82 - 7.42 K/uL    Lymphs (Absolute) 0.53 (L) 1.00 - 4.80 K/uL    Monos (Absolute) 0.31 0.00 - 0.85 K/uL    Eos (Absolute) 0.05 0.00 - 0.51 K/uL    Baso (Absolute) 0.06 0.00 - 0.12 K/uL    Immature Granulocytes (abs) 0.14 (H) 0.00 - 0.11 K/uL    NRBC (Absolute) 0.00 K/uL   LACTIC ACID    Collection Time: 01/03/24 12:30 AM   Result Value Ref Range    Lactic Acid 3.9 (H) 0.5 - 2.0 mmol/L   COMP METABOLIC PANEL    Collection Time: 01/03/24  8:53 PM   Result Value Ref Range    Sodium 134 (L) 135 - 145 mmol/L    Potassium 3.9 3.6 - 5.5 mmol/L    Chloride 96 96 - 112 mmol/L    Co2 23 20 - 33 mmol/L    Anion Gap 15.0 7.0 - 16.0    Glucose 122 (H) 65 - 99 mg/dL    Bun 12 8 - 22 mg/dL    Creatinine 0.82 0.50 - 1.40 mg/dL    Calcium 9.4 8.4 - 10.2 mg/dL    Correct Calcium 9.4 8.5 - 10.5 mg/dL    AST(SGOT) 26 12 - 45 U/L    ALT(SGPT) 19 2 - 50 U/L    Alkaline Phosphatase 105 (H) 30 - 99 U/L    Total Bilirubin 0.2 0.1 - 1.5 mg/dL    Albumin 4.0 3.2 - 4.9 g/dL    Total Protein 8.0 6.0 - 8.2 g/dL    Globulin 4.0 (H) 1.9 - 3.5 g/dL    A-G Ratio 1.0 g/dL   ESTIMATED GFR    Collection Time: 01/03/24  8:53 PM   Result Value Ref Range    GFR (CKD-EPI) 97 >60 mL/min/1.73 m 2    CoV-2, FLU A/B, and RSV by PCR (2-4 Hours CEPHEID) : Collect NP swab in VTM    Collection Time: 01/03/24 10:20 PM    Specimen: Nasopharyngeal; Respirate   Result Value Ref Range    Influenza virus A RNA Negative Negative    Influenza virus B, PCR Negative Negative    RSV, PCR Negative Negative    SARS-CoV-2 by PCR NotDetected     SARS-CoV-2 Source NP Swab    URINE DRUG SCREEN    Collection Time: 01/03/24 11:15 PM   Result Value Ref Range    Amphetamines Urine Negative Negative    Barbiturates Negative Negative    Benzodiazepines Negative Negative    Cocaine Metabolite Negative Negative    Fentanyl, Urine Negative Negative    Methadone Negative Negative    Opiates Negative Negative    Oxycodone Negative Negative    Phencyclidine -Pcp Negative Negative    Propoxyphene Negative Negative    Cannabinoid Metab Positive (A) Negative   HCG QUALITATIVE UR (Lab)    Collection Time: 01/03/24 11:15 PM   Result Value Ref Range    Beta-Hcg Urine Negative Negative   POCT arterial blood gas device results    Collection Time: 01/04/24  3:12 AM   Result Value Ref Range    Ph 7.271 (LL) 7.400 - 7.500    Pco2 54.3 (HH) 26.0 - 37.0 mmHg    Po2 158 (H) 64 - 87 mmHg    Tco2 27 20 - 33 mmol/L    S02 99 93 - 99 %    Hco3 25.0 17.0 - 25.0 mmol/L    BE -3 -4 - 3 mmol/L    Body Temp 97.4 F degrees    O2 Therapy 50 %    iPF Ratio 316     Ph Temp Jorge L 7.280 (LL) 7.400 - 7.500    Po2 Temp Cor 155 (H) 64 - 87 mmHg    Specimen Arterial     Ramrio Test Pass     DelSys Vent     Tidal Volume 360 mL    Peep End Expiratory Pressure 8 cmh20    Set Rate 22     Mode APV-CMV    POCT lactate device results    Collection Time: 01/04/24  3:12 AM   Result Value Ref Range    iStat Lactate 2.9 (H) 0.5 - 2.0 mmol/L   TROPONIN    Collection Time: 01/04/24  3:50 AM   Result Value Ref Range    Troponin T <6 6 - 19 ng/L   proBrain Natriuretic Peptide, NT    Collection Time: 01/04/24  3:50 AM   Result Value Ref Range    NT-proBNP 264 (H) 0 - 125 pg/mL   EKG (NOW)     Collection Time: 24  4:43 AM   Result Value Ref Range    Report       [facility]    Test Date:  2024  Pt Name:    VI DE LOS SANTOS              Department: [facility]  MRN:        [id number]                      [facility]  Gender:     Female                       Technician: [id number]  :        1990                   Requested By:BRUNILDA QUAN  Order #:    [id number]                    Reading MD: GABINO AHN MD    Measurements  Intervals                                Axis  Rate:       110                          P:          76  NV:         131                          QRS:        77  QRSD:       85                           T:          40  QT:         350  QTc:        474    Interpretive Statements  Sinus tachycardia  Probable left atrial enlargement  RSR' in V1 or V2, probably normal variant  Compared to ECG 2023 19:02:16  RSR' in V1 or V2 now present  Sinus rhythm no longer present  Electronically Signed On 2024 05:12:20 PST by GABINO AHN MD     EKG    Collection Time: 24  7:06 AM   Result Value Ref Range    Report       [facility]    Test Date:  2024  Pt Name:    VI DE LOS SANTOS              Department: TICU  MRN:        [id number]                      Room:       [facility]  Gender:     Female                       Technician: WILLIAM  :        1990                   Requested By:SUMA CORTEZ  Order #:    [id number]                    Reading MD: Petr Arias MD    Measurements  Intervals                                Axis  Rate:       102                          P:          69  NV:         129                          QRS:        66  QRSD:       87                           T:          40  QT:         367  QTc:        479    Interpretive Statements  Sinus tachycardia  Probable left atrial enlargement  RSR' in V1 or V2, right VCD or RVH  Borderline prolonged QT interval  Compared to  ECG 01/04/2024 04:43:49  Right ventricular hypertrophy now present  Electronically Signed On 01- 08:22:22 PST by Petr Arias MD     APTT    Collection Time: 01/04/24  7:13 AM   Result Value Ref Range    APTT 31.0 24.7 - 36.0 sec   Basic Metabolic Panel    Collection Time: 01/04/24  7:13 AM   Result Value Ref Range    Sodium 135 135 - 145 mmol/L    Potassium 4.7 3.6 - 5.5 mmol/L    Chloride 99 96 - 112 mmol/L    Co2 22 20 - 33 mmol/L    Glucose 234 (H) 65 - 99 mg/dL    Bun 10 8 - 22 mg/dL    Creatinine 0.79 0.50 - 1.40 mg/dL    Calcium 8.7 8.5 - 10.5 mg/dL    Anion Gap 14.0 7.0 - 16.0   CBC WITH DIFFERENTIAL    Collection Time: 01/04/24  7:13 AM   Result Value Ref Range    WBC 22.5 (H) 4.8 - 10.8 K/uL    RBC 4.03 (L) 4.20 - 5.40 M/uL    Hemoglobin 11.9 (L) 12.0 - 16.0 g/dL    Hematocrit 35.9 (L) 37.0 - 47.0 %    MCV 89.1 81.4 - 97.8 fL    MCH 29.5 27.0 - 33.0 pg    MCHC 33.1 32.2 - 35.5 g/dL    RDW 47.0 35.9 - 50.0 fL    Platelet Count 418 164 - 446 K/uL    MPV 8.5 (L) 9.0 - 12.9 fL    Neutrophils-Polys 93.90 (H) 44.00 - 72.00 %    Lymphocytes 4.40 (L) 22.00 - 41.00 %    Monocytes 1.70 0.00 - 13.40 %    Eosinophils 0.00 0.00 - 6.90 %    Basophils 0.00 0.00 - 1.80 %    Nucleated RBC 0.00 0.00 - 0.20 /100 WBC    Neutrophils (Absolute) 21.13 (H) 1.82 - 7.42 K/uL    Lymphs (Absolute) 0.99 (L) 1.00 - 4.80 K/uL    Monos (Absolute) 0.38 0.00 - 0.85 K/uL    Eos (Absolute) 0.00 0.00 - 0.51 K/uL    Baso (Absolute) 0.00 0.00 - 0.12 K/uL    NRBC (Absolute) 0.00 K/uL   FIBRINOGEN    Collection Time: 01/04/24  7:13 AM   Result Value Ref Range    Fibrinogen 642 (H) 215 - 460 mg/dL   IONIZED CALCIUM    Collection Time: 01/04/24  7:13 AM   Result Value Ref Range    Ionized Calcium 1.2 1.1 - 1.3 mmol/L   HEPATIC FUNCTION PANEL    Collection Time: 01/04/24  7:13 AM   Result Value Ref Range    Alkaline Phosphatase 94 30 - 99 U/L    AST(SGOT) 22 12 - 45 U/L    ALT(SGPT) 18 2 - 50 U/L    Total Bilirubin <0.2 0.1 - 1.5 mg/dL     Direct Bilirubin <0.2 0.1 - 0.5 mg/dL    Indirect Bilirubin see below 0.0 - 1.0 mg/dL    Albumin 3.7 3.2 - 4.9 g/dL    Total Protein 7.1 6.0 - 8.2 g/dL   Prothrombin Time    Collection Time: 01/04/24  7:13 AM   Result Value Ref Range    PT 15.2 (H) 12.0 - 14.6 sec    INR 1.18 (H) 0.87 - 1.13   TSH    Collection Time: 01/04/24  7:13 AM   Result Value Ref Range    TSH 2.050 0.380 - 5.330 uIU/mL   Free Thyroxine    Collection Time: 01/04/24  7:13 AM   Result Value Ref Range    Free T-4 1.29 0.93 - 1.70 ng/dL   ESTIMATED GFR    Collection Time: 01/04/24  7:13 AM   Result Value Ref Range    GFR (CKD-EPI) 101 >60 mL/min/1.73 m 2   DIFFERENTIAL MANUAL    Collection Time: 01/04/24  7:13 AM   Result Value Ref Range    Manual Diff Status PERFORMED    PERIPHERAL SMEAR REVIEW    Collection Time: 01/04/24  7:13 AM   Result Value Ref Range    Peripheral Smear Review see below    PLATELET ESTIMATE    Collection Time: 01/04/24  7:13 AM   Result Value Ref Range    Plt Estimation Normal    MORPHOLOGY    Collection Time: 01/04/24  7:13 AM   Result Value Ref Range    RBC Morphology Normal    POCT glucose device results    Collection Time: 01/04/24  7:19 AM   Result Value Ref Range    POC Glucose, Blood 204 (H) 65 - 99 mg/dL   POCT arterial blood gas device results    Collection Time: 01/04/24  8:06 AM   Result Value Ref Range    Ph 7.335 (L) 7.400 - 7.500    Pco2 45.6 (H) 26.0 - 37.0 mmHg    Po2 390 (H) 64 - 87 mmHg    Tco2 26 20 - 33 mmol/L    S02 100 (H) 93 - 99 %    Hco3 24.3 17.0 - 25.0 mmol/L    BE -2 -4 - 3 mmol/L    Body Temp 96.6 F degrees    O2 Therapy 100 %    iPF Ratio 390     Ph Temp Jorge L 7.351 (L) 7.400 - 7.500    Pco2 Temp Co 43.4 (H) 26.0 - 37.0 mmHg    Po2 Temp Cor 384 (H) 64 - 87 mmHg    Specimen Arterial     Ramiro Test Pass     DelSys Vent     End Tidal Carbon Dioxide 40 mmhg    Tidal Volume 360 mL    Peep End Expiratory Pressure 8 cmh20    Set Rate 12     Mode APV-CMV    POCT lactate device results    Collection  "Time: 01/04/24  8:06 AM   Result Value Ref Range    iStat Lactate 2.8 (H) 0.5 - 2.0 mmol/L   HEMOGLOBIN A1C    Collection Time: 01/04/24 12:10 PM   Result Value Ref Range    Glycohemoglobin 5.9 (H) 4.0 - 5.6 %    Est Avg Glucose 123 mg/dL   LACTIC ACID    Collection Time: 01/04/24 12:10 PM   Result Value Ref Range    Lactic Acid 1.0 0.5 - 2.0 mmol/L   POCT glucose device results    Collection Time: 01/04/24 12:12 PM   Result Value Ref Range    POC Glucose, Blood 93 65 - 99 mg/dL       Imaging  DX-CHEST-PORTABLE (1 VIEW)   Final Result      1.  Interstitial prominence, new from prior exam.   2.  Multifocal mediastinal prominence as evaluated on CT chest.   3.  Support apparatus as above.      US-EXTREMITY VENOUS LOWER BILAT    (Results Pending)   EC-ECHOCARDIOGRAM COMPLETE W/O CONT    (Results Pending)       Assessment/Plan  * Acute respiratory failure with hypoxia (HCC)- (present on admission)  Assessment & Plan  Intubated 1/4 at outside facility for refractory respiratory distress, stridor and wheezing    Ventilator dependent respiratory failure    RT/O2 protocols  Daily and PRN VBG/ABGs  Titration of ventilator to optimize lung protection, gas exchange and acid-base status   Sedation as tolerated/indicated  Daily CXR  HOB >30 degrees and chlorhexidine for VAP prevention  Pepcid for GI prophylaxis  SAT/SBT when able (ABCDEF Bundle)  Early mobility        Pulmonary emboli (HCC)  Assessment & Plan  Uncertain diagnosis  CT chest read as upper lobe subsegmental PEs as \"questionable\" an \"equivocal\"  Did not receive AC prior to arrival    Not clear if actual diagnosis    Obtain CHELSI delgadillo    Will hold AC for now given need for surgical biopsy  Obtain TTE          Lymphadenopathy- (present on admission)  Assessment & Plan  1/4 Chest CT with peritracheal, cervical and axillary LAD, with multi-station mediastinal LAD  Peritracheal mass represents a LN, likely lymphproliferative disorder    Onc consult  ACGS consult to " perform excisional biopsy tomorrow (NPO tonight)  TLS labs  Will perform bronchoscopy to sample RML and obtain cytology and eval for tracheal obstruction        Asthma- (present on admission)  Assessment & Plan  Reported wheezing at outside hospital  S/P nebs and steroids    No significant bronchspasm here and no obstructive physiology on the vent  Duonebs for now  Hold steroids so as to not obscure biopsy results          Discussed patient condition and risk of morbidity and/or mortality with Family, RN, RT, Therapies, Pharmacy, and general surgery and oncology.    The patient remains critically ill.  Critical care time = 105 minutes in directly providing and coordinating critical care and extensive data review.  No time overlap and excludes procedures.

## 2024-01-04 NOTE — ED NOTES
Pt. Combative with RT, hitting staff and ripping off face mask for neb treatment. ERP aware and orders for Ketamine.

## 2024-01-04 NOTE — PROGRESS NOTES
Pt arrival to T 930 brought via gurney and transferred to ICU bed via pad. Pt attached to in room monitor. CHG bath provided. Pt assessment performed by primary RN.    Arrival time: 0640  Temp: 97.3f f via temporal   Weight: 59.4 kg via bed scale    2 RN skin assessment completed by Rossy RN and Fabiola RN. Pt with the following noted:    - petechiae rash to mid chest  - callouses bilateral feet  - L arm scattered abrasions    Pt attempting to sit up in bed, sacral/back exam deferred until medication given.    0655 100 mcg fentanyl given. Of note 100 mcg fentanyl given en route as well, so pt has received 2 x prn bolus's. Propofol remains running at 60 mcg from transport.          Bed in lowest, locked position. Bed alarm on.    GENERAL: NAD  HEENT:  Atraumatic  CHEST/LUNG: Chest rise equal bilaterally, ronchi ausculated in all lung fields.   HEART: Regular rate and rhythm  ABDOMEN: Soft, Nontender, Nondistended  EXTREMITIES:  Extremities cool  PSYCH: A&Ox3  SKIN: No obvious rashes or lesions  NEUROLOGY: strength and sensation intact in all extremities.

## 2024-01-04 NOTE — CONSULTS
Hematology & Oncology Consultation    Date of Service  1/4/2024    Referring Physician  Murray Morris M.D.    Consulting Physician  Philip Hebert M.D.    Reason for Consultation  Suspected lymphoma    History of Presenting Illness - limited by patient intubated and sedated, supplemented by discussion with family at bedside  33 y.o. female who presented 1/4/2024 with increasing SOB. Patient had lymphadenopathy waxing and waning over the past few months. There was previous prescriptions for prednisone and antibiotics. SOB and lymphadenopathy would improve for a short time and then recur. On presentation yesterday to [facility] there was finding of lymphadenopathy on exam and CT imaging showed large mediastinal mass. Presently she's intubated in ICU.    Review of Systems  Review of Systems   Unable to perform ROS: Intubated       Past Medical History   has a past medical history of ASTHMA, Ovarian cyst, and Psychiatric disorder.    Surgical History   has a past surgical history that includes gyn surgery and other.    Family History  family history includes Hypertension in her mother.    Social History   reports that she has been smoking cigarettes. She has a 2.5 pack-year smoking history. She has never used smokeless tobacco. She reports that she does not currently use alcohol. She reports that she does not currently use drugs after having used the following drugs: Inhaled.    Medications  Prior to Admission Medications   Prescriptions Last Dose Informant Patient Reported? Taking?   albuterol (PROVENTIL) 2.5mg/3ml Nebu Soln solution for nebulization  Patient No No   Sig: Take 3 mL by nebulization every four hours as needed for Shortness of Breath.   albuterol 108 (90 Base) MCG/ACT Aero Soln inhalation aerosol  Patient No No   Sig: Inhale 2 Puffs every 6 hours as needed for Shortness of Breath.   busPIRone (BUSPAR) 30 MG tablet  Patient Yes No   Sig: Take 30 mg by mouth 2 times a day.   famotidine (PEPCID) 40 MG Tab    No No   Sig: Take 1 Tablet by mouth every day.   ipratropium-albuterol (COMBIVENT RESPIMAT)  MCG/ACT Aero Soln   No No   Sig: Inhale 1 Puff 4 times a day as needed (Wheeze).   ipratropium-albuterol (DUONEB) 0.5-2.5 (3) MG/3ML nebulizer solution   No No   Sig: Take 3 mL by nebulization every four hours as needed for Shortness of Breath.   ipratropium-albuterol (DUONEB) 0.5-2.5 (3) MG/3ML nebulizer solution   No No   Sig: Take 3 mL by nebulization every 6 hours as needed for Shortness of Breath.   lamotrigine (LAMICTAL) 200 MG tablet  Patient Yes No   Sig: Take 200 mg by mouth 2 times a day.   propranolol (INDERAL) 40 MG Tab  Patient Yes No   Sig: Take 40 mg by mouth 2 times a day.      Facility-Administered Medications: None       Allergies  No Known Allergies    Physical Exam  Pulse:  [] 97  Resp:  [11-22] 14  BP: ()/(52-66) 102/55  SpO2:  [98 %-100 %] 99 %    Physical Exam  HENT:      Head: Normocephalic and atraumatic.      Right Ear: External ear normal.      Left Ear: External ear normal.      Nose: Nose normal.      Mouth/Throat:      Mouth: Mucous membranes are moist.      Pharynx: Oropharynx is clear.   Eyes:      Extraocular Movements: Extraocular movements intact.      Conjunctiva/sclera: Conjunctivae normal.      Pupils: Pupils are equal, round, and reactive to light.   Cardiovascular:      Rate and Rhythm: Normal rate and regular rhythm.      Pulses: Normal pulses.   Pulmonary:      Effort: Pulmonary effort is normal.      Breath sounds: Normal breath sounds.   Abdominal:      General: Abdomen is flat. Bowel sounds are normal.      Palpations: Abdomen is soft.   Musculoskeletal:         General: Normal range of motion.   Lymphadenopathy:      Cervical: Cervical adenopathy present.   Skin:     General: Skin is warm and dry.         Fluids  Date 01/04/24 0700 - 01/05/24 0659   Shift 0146-0602 3207-1413 5909-4767 24 Hour Total   INTAKE   I.V. 1091   1091   Other 60   60   Shift Total 1151    1151   OUTPUT   Shift Total       Weight (kg)           Laboratory  Recent Labs     01/03/24  0030 01/04/24  0713   WBC 24.7* 22.5*   RBC 4.14* 4.03*   HEMOGLOBIN 12.4 11.9*   HEMATOCRIT 36.4* 35.9*   MCV 87.9 89.1   MCH 30.0 29.5   MCHC 34.1 33.1   RDW 44.6 47.0   PLATELETCT 430 418   MPV 8.5* 8.5*     Recent Labs     01/03/24 2053 01/04/24  0713   SODIUM 134* 135   POTASSIUM 3.9 4.7   CHLORIDE 96 99   CO2 23 22   GLUCOSE 122* 234*   BUN 12 10   CREATININE 0.82 0.79   CALCIUM 9.4 8.7     Recent Labs     01/04/24  0713   APTT 31.0   INR 1.18*                 Imaging  1/4/2024 12:35 AM     HISTORY/REASON FOR EXAM:  dyspnea, hypoxemia        TECHNIQUE/EXAM DESCRIPTION:  CT angiogram scan for pulmonary embolism with contrast, with reconstructions.     1.25 mm helical sections were obtained from the lung apices through the lung bases following the rapid bolus administration of 100 mL of Omnipaque 350 nonionic contrast. Thin-section overlapping reconstruction interval was utilized. Coronal   reconstructions were generated from the axial data. MIP post processing was performed and utilized for the interpretation.     Low dose optimization technique was utilized for this CT exam including automated exposure control and adjustment of the mA and/or kV according to patient size.     COMPARISON: None     FINDINGS:  Pulmonary Embolism: Equivocal.     Main Pulmonary Arteries: No.     Segmental branches: Possible filling defect in bilateral upper lobe segmental arteries.     Only if positive for PE:     RV diameter: 3.0 cm.     LV diameter: 3.9 cm.     RV/LV ratio: 0.8. (Greater than 0.9 is abnormal.)     Additional Comments: None.     Lungs: Right middle lobe and lingular consolidations. Scattered groundglass densities throughout the bilateral lungs.     Pleura: No pleural effusion.     Nodes: Right paratracheal mass measuring 5.0 x 5.9 cm. Multi station mediastinal lymphadenopathy. Pericardial lymphadenopathy..     Additional  findings: ET tube tip in the midtrachea. Mass effect on the left brachiocephalic vein and SVC.     IMPRESSION:     1.  Questionable acute pulmonary emboli in bilateral upper lobe segmental arteries. No evidence of right heart strain.  2.  Right paratracheal mass with extensive multistation mediastinal lymphadenopathy. The mass does represent a lymph node. Findings are highly concerning for lymphoproliferative disorder. Other neoplastic etiologies are possible.  3.  Mass effect on the left brachiocephalic vein and SVC.  4.  Right middle lobe and lingular consolidations, concerning for pneumonia.  5.  Scattered groundglass densities throughout the bilateral lungs, nonspecific. These may be infectious or inflammatory, related to edema, or neoplastic.        1/4/2024 12:35 AM     HISTORY/REASON FOR EXAM:  left neck swelling.        TECHNIQUE/EXAM DESCRIPTION AND NUMBER OF VIEWS:  CT soft tissue neck with contrast.     The study was performed on a helical multidetector CT scanner. Contiguous thin section helical images were obtained of the neck from the skull base through the thoracic inlet.     100 mL of Omnipaque 350 nonionic contrast was injected intravenously.     Low dose optimization technique was utilized for this CT exam including automated exposure control and adjustment of the mA and/or kV according to patient size.     COMPARISON: CT neck 9/6/2020.     FINDINGS:     BRAIN: Visualized portions of the brain are normal in appearance.     TEMPORAL bones: The mastoid air cells and middle ear are clear.     PARANASAL SINUSES: The paranasal sinuses are clear.     CERVICAL spine: Posterior disc osteophyte complex at C5-C6 with at least moderate spinal canal narrowing.     NODES: Enlarged, left greater than right cervical lymph node with index left supraclavicular node measuring 1.9 cm in short axis.     SALIVARY and THYROID gland: The parotid, submandibular, and thyroid gland are normal in appearance.     AIRWAY: The  airway appears patent. ET tube tip in the mid trachea.     See evaluation of the chest on dedicated imaging.     IMPRESSION:     1.  Left greater than right cervical lymphadenopathy, highly concerning for lymphoproliferative disorder, particularly given findings in the chest.  2.  Posterior disc osteophyte complex at C5-C6 with at least moderate spinal canal narrowing.  3.  See evaluation of the chest on dedicated imaging.     Study unavailable for interpretation until 1/4/2024 4:19 AM due to unknown issue with hospital PACS system.      Assessment/Plan  Lymphadenopathy - consistent with likely lymphoma, need excisional lymph node biopsy for pathologic diagnosis, check LDH, uric acid, hepatitis panel for future treatment and prevent tumor lysis. May need to consider initial cycle of chemotherapy or single agent Rituxan to debulk disease. Eventually would benefit from PET/CT and bone marrow biopsy to complete staging. LP may also be clinically indicated depending on pathology    Case discussed with RN, patient's family at bedside and Dr. Morris via phone.    Thank you very much for the consult, please call with any questions.    Philip Hebert M.D.

## 2024-01-04 NOTE — ED NOTES
Re-assessed pt by Erp and made additional order. Pt tried to wean with the oxygen; pt walked going to the rest room, feeling exhausted and out of breath, pt placed on oxygen at 2LPM via nasal cannula, Erp aware

## 2024-01-05 ENCOUNTER — APPOINTMENT (OUTPATIENT)
Dept: RADIOLOGY | Facility: MEDICAL CENTER | Age: 34
DRG: 823 | End: 2024-01-05
Attending: EMERGENCY MEDICINE
Payer: MEDICAID

## 2024-01-05 ENCOUNTER — ANESTHESIA EVENT (OUTPATIENT)
Dept: SURGERY | Facility: MEDICAL CENTER | Age: 34
DRG: 823 | End: 2024-01-05
Payer: MEDICAID

## 2024-01-05 ENCOUNTER — APPOINTMENT (OUTPATIENT)
Dept: CARDIOLOGY | Facility: MEDICAL CENTER | Age: 34
DRG: 823 | End: 2024-01-05
Attending: EMERGENCY MEDICINE
Payer: MEDICAID

## 2024-01-05 ENCOUNTER — ANESTHESIA (OUTPATIENT)
Dept: SURGERY | Facility: MEDICAL CENTER | Age: 34
DRG: 823 | End: 2024-01-05
Payer: MEDICAID

## 2024-01-05 PROBLEM — I26.99 PULMONARY EMBOLI (HCC): Status: RESOLVED | Noted: 2024-01-04 | Resolved: 2024-01-05

## 2024-01-05 LAB
ALBUMIN SERPL BCP-MCNC: 3.1 G/DL (ref 3.2–4.9)
ALBUMIN/GLOB SERPL: 0.9 G/DL
ALP SERPL-CCNC: 85 U/L (ref 30–99)
ALT SERPL-CCNC: 18 U/L (ref 2–50)
ANION GAP SERPL CALC-SCNC: 9 MMOL/L (ref 7–16)
APPEARANCE FLD: NORMAL
ARTERIAL PATENCY WRIST A: ABNORMAL
AST SERPL-CCNC: 19 U/L (ref 12–45)
BASE EXCESS BLDA CALC-SCNC: 1 MMOL/L (ref -4–3)
BASE EXCESS BLDA CALC-SCNC: 1 MMOL/L (ref -4–3)
BASE EXCESS BLDA CALC-SCNC: 2 MMOL/L (ref -4–3)
BASOPHILS # BLD AUTO: 0.4 % (ref 0–1.8)
BASOPHILS # BLD: 0.08 K/UL (ref 0–0.12)
BILIRUB SERPL-MCNC: 0.2 MG/DL (ref 0.1–1.5)
BODY FLD TYPE: NORMAL
BODY TEMPERATURE: ABNORMAL DEGREES
BREATHS SETTING VENT: 18
BREATHS SETTING VENT: 22
BREATHS SETTING VENT: 28
BUN SERPL-MCNC: 11 MG/DL (ref 8–22)
CALCIUM ALBUM COR SERPL-MCNC: 9.3 MG/DL (ref 8.5–10.5)
CALCIUM SERPL-MCNC: 8.6 MG/DL (ref 8.5–10.5)
CELLS FLD: 1
CHLORIDE SERPL-SCNC: 102 MMOL/L (ref 96–112)
CO2 BLDA-SCNC: 29 MMOL/L (ref 20–33)
CO2 BLDA-SCNC: 29 MMOL/L (ref 20–33)
CO2 BLDA-SCNC: 30 MMOL/L (ref 20–33)
CO2 SERPL-SCNC: 26 MMOL/L (ref 20–33)
COLOR FLD: NORMAL
CREAT SERPL-MCNC: 0.66 MG/DL (ref 0.5–1.4)
DELSYS IDSYS: ABNORMAL
END TIDAL CARBON DIOXIDE IECO2: 27 MMHG
END TIDAL CARBON DIOXIDE IECO2: 41 MMHG
END TIDAL CARBON DIOXIDE IECO2: 60 MMHG
EOSINOPHIL # BLD AUTO: 0.06 K/UL (ref 0–0.51)
EOSINOPHIL NFR BLD: 0.3 % (ref 0–6.9)
EOSINOPHIL NFR FLD: 2 %
ERYTHROCYTE [DISTWIDTH] IN BLOOD BY AUTOMATED COUNT: 51.8 FL (ref 35.9–50)
FUNGUS SPEC FUNGUS STN: NORMAL
GFR SERPLBLD CREATININE-BSD FMLA CKD-EPI: 119 ML/MIN/1.73 M 2
GLOBULIN SER CALC-MCNC: 3.5 G/DL (ref 1.9–3.5)
GLUCOSE BLD STRIP.AUTO-MCNC: 104 MG/DL (ref 65–99)
GLUCOSE BLD STRIP.AUTO-MCNC: 144 MG/DL (ref 65–99)
GLUCOSE BLD STRIP.AUTO-MCNC: 182 MG/DL (ref 65–99)
GLUCOSE BLD STRIP.AUTO-MCNC: 67 MG/DL (ref 65–99)
GLUCOSE BLD STRIP.AUTO-MCNC: 70 MG/DL (ref 65–99)
GLUCOSE BLD STRIP.AUTO-MCNC: 84 MG/DL (ref 65–99)
GLUCOSE BLD STRIP.AUTO-MCNC: 91 MG/DL (ref 65–99)
GLUCOSE BLD STRIP.AUTO-MCNC: 91 MG/DL (ref 65–99)
GLUCOSE SERPL-MCNC: 100 MG/DL (ref 65–99)
HCO3 BLDA-SCNC: 27.2 MMOL/L (ref 17–25)
HCO3 BLDA-SCNC: 27.6 MMOL/L (ref 17–25)
HCO3 BLDA-SCNC: 28.4 MMOL/L (ref 17–25)
HCT VFR BLD AUTO: 32.8 % (ref 37–47)
HGB BLD-MCNC: 10.7 G/DL (ref 12–16)
HOROWITZ INDEX BLDA+IHG-RTO: 135 MM[HG]
HOROWITZ INDEX BLDA+IHG-RTO: 180 MM[HG]
HOROWITZ INDEX BLDA+IHG-RTO: 185 MM[HG]
IMM GRANULOCYTES # BLD AUTO: 0.1 K/UL (ref 0–0.11)
IMM GRANULOCYTES NFR BLD AUTO: 0.5 % (ref 0–0.9)
INR PPP: 1.18 (ref 0.87–1.13)
LACTATE BLD-SCNC: 0.6 MMOL/L (ref 0.5–2)
LACTATE BLD-SCNC: 0.6 MMOL/L (ref 0.5–2)
LACTATE BLD-SCNC: 0.9 MMOL/L (ref 0.5–2)
LV EJECT FRACT  99904: 60
LYMPHOCYTES # BLD AUTO: 0.81 K/UL (ref 1–4.8)
LYMPHOCYTES NFR BLD: 3.7 % (ref 22–41)
LYMPHOCYTES NFR FLD: 2 %
MAGNESIUM SERPL-MCNC: 2.3 MG/DL (ref 1.5–2.5)
MCH RBC QN AUTO: 30.3 PG (ref 27–33)
MCHC RBC AUTO-ENTMCNC: 32.6 G/DL (ref 32.2–35.5)
MCV RBC AUTO: 92.9 FL (ref 81.4–97.8)
MODE IMODE: ABNORMAL
MONOCYTES # BLD AUTO: 1.33 K/UL (ref 0–0.85)
MONOCYTES NFR BLD AUTO: 6 % (ref 0–13.4)
MONOS+MACROS NFR FLD MANUAL: 8 %
NEUTROPHILS # BLD AUTO: 19.68 K/UL (ref 1.82–7.42)
NEUTROPHILS NFR BLD: 89.1 % (ref 44–72)
NEUTROPHILS NFR FLD: 87 %
NRBC # BLD AUTO: 0 K/UL
NRBC BLD-RTO: 0 /100 WBC (ref 0–0.2)
O2/TOTAL GAS SETTING VFR VENT: 40 %
PATHOLOGY CONSULT NOTE: NORMAL
PCO2 BLDA: 46.4 MMHG (ref 26–37)
PCO2 BLDA: 50.9 MMHG (ref 26–37)
PCO2 BLDA: 56.8 MMHG (ref 26–37)
PCO2 TEMP ADJ BLDA: 45.9 MMHG (ref 26–37)
PCO2 TEMP ADJ BLDA: 50.4 MMHG (ref 26–37)
PCO2 TEMP ADJ BLDA: 56.8 MMHG (ref 26–37)
PEEP END EXPIRATORY PRESSURE IPEEP: 8 CMH20
PH BLDA: 7.31 [PH] (ref 7.4–7.5)
PH BLDA: 7.34 [PH] (ref 7.4–7.5)
PH BLDA: 7.38 [PH] (ref 7.4–7.5)
PH TEMP ADJ BLDA: 7.31 [PH] (ref 7.4–7.5)
PH TEMP ADJ BLDA: 7.34 [PH] (ref 7.4–7.5)
PH TEMP ADJ BLDA: 7.38 [PH] (ref 7.4–7.5)
PHOSPHATE SERPL-MCNC: 3.5 MG/DL (ref 2.5–4.5)
PLATELET # BLD AUTO: 363 K/UL (ref 164–446)
PMV BLD AUTO: 8.6 FL (ref 9–12.9)
PO2 BLDA: 54 MMHG (ref 64–87)
PO2 BLDA: 72 MMHG (ref 64–87)
PO2 BLDA: 74 MMHG (ref 64–87)
PO2 TEMP ADJ BLDA: 53 MMHG (ref 64–87)
PO2 TEMP ADJ BLDA: 72 MMHG (ref 64–87)
PO2 TEMP ADJ BLDA: 73 MMHG (ref 64–87)
POTASSIUM SERPL-SCNC: 4.7 MMOL/L (ref 3.6–5.5)
PROCALCITONIN SERPL-MCNC: 0.79 NG/ML
PROT SERPL-MCNC: 6.6 G/DL (ref 6–8.2)
PROTHROMBIN TIME: 15.2 SEC (ref 12–14.6)
RBC # BLD AUTO: 3.53 M/UL (ref 4.2–5.4)
SAO2 % BLDA: 85 % (ref 93–99)
SAO2 % BLDA: 92 % (ref 93–99)
SAO2 % BLDA: 94 % (ref 93–99)
SIGNIFICANT IND 70042: NORMAL
SITE SITE: NORMAL
SODIUM SERPL-SCNC: 137 MMOL/L (ref 135–145)
SOURCE SOURCE: NORMAL
SPECIMEN DRAWN FROM PATIENT: ABNORMAL
TIDAL VOLUME IVT: 330 ML
URATE SERPL-MCNC: 2.6 MG/DL (ref 1.9–8.2)
WBC # BLD AUTO: 22.1 K/UL (ref 4.8–10.8)

## 2024-01-05 PROCEDURE — 700101 HCHG RX REV CODE 250: Performed by: EMERGENCY MEDICINE

## 2024-01-05 PROCEDURE — 94003 VENT MGMT INPAT SUBQ DAY: CPT

## 2024-01-05 PROCEDURE — 700101 HCHG RX REV CODE 250: Performed by: ANESTHESIOLOGY

## 2024-01-05 PROCEDURE — 87077 CULTURE AEROBIC IDENTIFY: CPT

## 2024-01-05 PROCEDURE — 87102 FUNGUS ISOLATION CULTURE: CPT

## 2024-01-05 PROCEDURE — 85025 COMPLETE CBC W/AUTO DIFF WBC: CPT

## 2024-01-05 PROCEDURE — 700111 HCHG RX REV CODE 636 W/ 250 OVERRIDE (IP): Mod: JZ | Performed by: EMERGENCY MEDICINE

## 2024-01-05 PROCEDURE — 31645 BRNCHSC W/THER ASPIR 1ST: CPT | Performed by: EMERGENCY MEDICINE

## 2024-01-05 PROCEDURE — 87206 SMEAR FLUORESCENT/ACID STAI: CPT

## 2024-01-05 PROCEDURE — 700111 HCHG RX REV CODE 636 W/ 250 OVERRIDE (IP): Performed by: NURSE PRACTITIONER

## 2024-01-05 PROCEDURE — 700101 HCHG RX REV CODE 250

## 2024-01-05 PROCEDURE — 93970 EXTREMITY STUDY: CPT

## 2024-01-05 PROCEDURE — 700105 HCHG RX REV CODE 258: Performed by: EMERGENCY MEDICINE

## 2024-01-05 PROCEDURE — 160041 HCHG SURGERY MINUTES - EA ADDL 1 MIN LEVEL 4: Performed by: STUDENT IN AN ORGANIZED HEALTH CARE EDUCATION/TRAINING PROGRAM

## 2024-01-05 PROCEDURE — 38525 BIOPSY/REMOVAL LYMPH NODES: CPT | Mod: LT | Performed by: STUDENT IN AN ORGANIZED HEALTH CARE EDUCATION/TRAINING PROGRAM

## 2024-01-05 PROCEDURE — 82962 GLUCOSE BLOOD TEST: CPT

## 2024-01-05 PROCEDURE — 88342 IMHCHEM/IMCYTCHM 1ST ANTB: CPT

## 2024-01-05 PROCEDURE — 700105 HCHG RX REV CODE 258: Performed by: ANESTHESIOLOGY

## 2024-01-05 PROCEDURE — 83605 ASSAY OF LACTIC ACID: CPT | Mod: 91

## 2024-01-05 PROCEDURE — 83735 ASSAY OF MAGNESIUM: CPT

## 2024-01-05 PROCEDURE — 302977 HCHG BRONCHOSCOPY PROC-THERAPEUTIC

## 2024-01-05 PROCEDURE — 99233 SBSQ HOSP IP/OBS HIGH 50: CPT | Performed by: INTERNAL MEDICINE

## 2024-01-05 PROCEDURE — 160048 HCHG OR STATISTICAL LEVEL 1-5: Performed by: STUDENT IN AN ORGANIZED HEALTH CARE EDUCATION/TRAINING PROGRAM

## 2024-01-05 PROCEDURE — 84100 ASSAY OF PHOSPHORUS: CPT

## 2024-01-05 PROCEDURE — 82803 BLOOD GASES ANY COMBINATION: CPT | Mod: 91

## 2024-01-05 PROCEDURE — 89240 UNLISTED MISC PATH TEST: CPT

## 2024-01-05 PROCEDURE — 31624 DX BRONCHOSCOPE/LAVAGE: CPT | Performed by: EMERGENCY MEDICINE

## 2024-01-05 PROCEDURE — 700117 HCHG RX CONTRAST REV CODE 255: Performed by: EMERGENCY MEDICINE

## 2024-01-05 PROCEDURE — 99291 CRITICAL CARE FIRST HOUR: CPT | Mod: 25 | Performed by: EMERGENCY MEDICINE

## 2024-01-05 PROCEDURE — 160029 HCHG SURGERY MINUTES - 1ST 30 MINS LEVEL 4: Performed by: STUDENT IN AN ORGANIZED HEALTH CARE EDUCATION/TRAINING PROGRAM

## 2024-01-05 PROCEDURE — 84145 PROCALCITONIN (PCT): CPT

## 2024-01-05 PROCEDURE — 99222 1ST HOSP IP/OBS MODERATE 55: CPT | Mod: 57 | Performed by: STUDENT IN AN ORGANIZED HEALTH CARE EDUCATION/TRAINING PROGRAM

## 2024-01-05 PROCEDURE — 36600 WITHDRAWAL OF ARTERIAL BLOOD: CPT

## 2024-01-05 PROCEDURE — 700111 HCHG RX REV CODE 636 W/ 250 OVERRIDE (IP): Mod: JZ | Performed by: INTERNAL MEDICINE

## 2024-01-05 PROCEDURE — 93970 EXTREMITY STUDY: CPT | Mod: 26 | Performed by: INTERNAL MEDICINE

## 2024-01-05 PROCEDURE — 770022 HCHG ROOM/CARE - ICU (200)

## 2024-01-05 PROCEDURE — 700111 HCHG RX REV CODE 636 W/ 250 OVERRIDE (IP): Performed by: ANESTHESIOLOGY

## 2024-01-05 PROCEDURE — 84550 ASSAY OF BLOOD/URIC ACID: CPT

## 2024-01-05 PROCEDURE — 700102 HCHG RX REV CODE 250 W/ 637 OVERRIDE(OP): Performed by: EMERGENCY MEDICINE

## 2024-01-05 PROCEDURE — 71045 X-RAY EXAM CHEST 1 VIEW: CPT

## 2024-01-05 PROCEDURE — 93306 TTE W/DOPPLER COMPLETE: CPT

## 2024-01-05 PROCEDURE — 700101 HCHG RX REV CODE 250: Performed by: STUDENT IN AN ORGANIZED HEALTH CARE EDUCATION/TRAINING PROGRAM

## 2024-01-05 PROCEDURE — 07B60ZX EXCISION OF LEFT AXILLARY LYMPHATIC, OPEN APPROACH, DIAGNOSTIC: ICD-10-PCS | Performed by: STUDENT IN AN ORGANIZED HEALTH CARE EDUCATION/TRAINING PROGRAM

## 2024-01-05 PROCEDURE — 0B9D8ZX DRAINAGE OF RIGHT MIDDLE LUNG LOBE, VIA NATURAL OR ARTIFICIAL OPENING ENDOSCOPIC, DIAGNOSTIC: ICD-10-PCS | Performed by: EMERGENCY MEDICINE

## 2024-01-05 PROCEDURE — 88112 CYTOPATH CELL ENHANCE TECH: CPT

## 2024-01-05 PROCEDURE — 88184 FLOWCYTOMETRY/ TC 1 MARKER: CPT

## 2024-01-05 PROCEDURE — 94799 UNLISTED PULMONARY SVC/PX: CPT

## 2024-01-05 PROCEDURE — 80053 COMPREHEN METABOLIC PANEL: CPT

## 2024-01-05 PROCEDURE — 71275 CT ANGIOGRAPHY CHEST: CPT

## 2024-01-05 PROCEDURE — 87205 SMEAR GRAM STAIN: CPT

## 2024-01-05 PROCEDURE — 700105 HCHG RX REV CODE 258

## 2024-01-05 PROCEDURE — 87070 CULTURE OTHR SPECIMN AEROBIC: CPT

## 2024-01-05 PROCEDURE — 88341 IMHCHEM/IMCYTCHM EA ADD ANTB: CPT | Mod: 91

## 2024-01-05 PROCEDURE — 94640 AIRWAY INHALATION TREATMENT: CPT

## 2024-01-05 PROCEDURE — 88185 FLOWCYTOMETRY/TC ADD-ON: CPT

## 2024-01-05 PROCEDURE — 700105 HCHG RX REV CODE 258: Performed by: NURSE PRACTITIONER

## 2024-01-05 PROCEDURE — 87015 SPECIMEN INFECT AGNT CONCNTJ: CPT

## 2024-01-05 PROCEDURE — A9270 NON-COVERED ITEM OR SERVICE: HCPCS | Performed by: EMERGENCY MEDICINE

## 2024-01-05 PROCEDURE — 85610 PROTHROMBIN TIME: CPT

## 2024-01-05 PROCEDURE — 88305 TISSUE EXAM BY PATHOLOGIST: CPT

## 2024-01-05 PROCEDURE — 87116 MYCOBACTERIA CULTURE: CPT

## 2024-01-05 PROCEDURE — 93306 TTE W/DOPPLER COMPLETE: CPT | Mod: 26 | Performed by: INTERNAL MEDICINE

## 2024-01-05 PROCEDURE — 160009 HCHG ANES TIME/MIN: Performed by: STUDENT IN AN ORGANIZED HEALTH CARE EDUCATION/TRAINING PROGRAM

## 2024-01-05 RX ORDER — SODIUM CHLORIDE, SODIUM LACTATE, POTASSIUM CHLORIDE, AND CALCIUM CHLORIDE .6; .31; .03; .02 G/100ML; G/100ML; G/100ML; G/100ML
500 INJECTION, SOLUTION INTRAVENOUS ONCE
Status: COMPLETED | OUTPATIENT
Start: 2024-01-05 | End: 2024-01-05

## 2024-01-05 RX ORDER — HALOPERIDOL 5 MG/ML
5 INJECTION INTRAMUSCULAR EVERY 4 HOURS PRN
Status: DISCONTINUED | OUTPATIENT
Start: 2024-01-05 | End: 2024-01-09

## 2024-01-05 RX ORDER — MIDAZOLAM HYDROCHLORIDE 1 MG/ML
1-5 INJECTION INTRAMUSCULAR; INTRAVENOUS ONCE
Status: COMPLETED | OUTPATIENT
Start: 2024-01-05 | End: 2024-01-05

## 2024-01-05 RX ORDER — MIDAZOLAM HYDROCHLORIDE 1 MG/ML
2 INJECTION INTRAMUSCULAR; INTRAVENOUS ONCE
Status: COMPLETED | OUTPATIENT
Start: 2024-01-05 | End: 2024-01-05

## 2024-01-05 RX ORDER — PHENYLEPHRINE HCL IN 0.9% NACL 0.5 MG/5ML
SYRINGE (ML) INTRAVENOUS PRN
Status: DISCONTINUED | OUTPATIENT
Start: 2024-01-05 | End: 2024-01-05 | Stop reason: SURG

## 2024-01-05 RX ORDER — NOREPINEPHRINE BITARTRATE 0.03 MG/ML
INJECTION, SOLUTION INTRAVENOUS
Status: COMPLETED
Start: 2024-01-05 | End: 2024-01-05

## 2024-01-05 RX ORDER — SODIUM CHLORIDE, SODIUM LACTATE, POTASSIUM CHLORIDE, CALCIUM CHLORIDE 600; 310; 30; 20 MG/100ML; MG/100ML; MG/100ML; MG/100ML
INJECTION, SOLUTION INTRAVENOUS
Status: DISCONTINUED | OUTPATIENT
Start: 2024-01-05 | End: 2024-01-05 | Stop reason: SURG

## 2024-01-05 RX ORDER — MIDAZOLAM HYDROCHLORIDE 1 MG/ML
INJECTION INTRAMUSCULAR; INTRAVENOUS
Status: COMPLETED
Start: 2024-01-05 | End: 2024-01-05

## 2024-01-05 RX ORDER — CEFAZOLIN SODIUM 1 G/3ML
INJECTION, POWDER, FOR SOLUTION INTRAMUSCULAR; INTRAVENOUS PRN
Status: DISCONTINUED | OUTPATIENT
Start: 2024-01-05 | End: 2024-01-05 | Stop reason: SURG

## 2024-01-05 RX ORDER — NOREPINEPHRINE BITARTRATE 0.03 MG/ML
0-1 INJECTION, SOLUTION INTRAVENOUS CONTINUOUS
Status: DISCONTINUED | OUTPATIENT
Start: 2024-01-05 | End: 2024-01-07

## 2024-01-05 RX ORDER — ENOXAPARIN SODIUM 100 MG/ML
40 INJECTION SUBCUTANEOUS DAILY
Status: DISCONTINUED | OUTPATIENT
Start: 2024-01-05 | End: 2024-01-11 | Stop reason: HOSPADM

## 2024-01-05 RX ORDER — BUPIVACAINE HYDROCHLORIDE AND EPINEPHRINE 5; 5 MG/ML; UG/ML
INJECTION, SOLUTION PERINEURAL
Status: DISCONTINUED | OUTPATIENT
Start: 2024-01-05 | End: 2024-01-05 | Stop reason: HOSPADM

## 2024-01-05 RX ADMIN — FENTANYL CITRATE 100 MCG: 50 INJECTION, SOLUTION INTRAMUSCULAR; INTRAVENOUS at 16:16

## 2024-01-05 RX ADMIN — IPRATROPIUM BROMIDE AND ALBUTEROL SULFATE 3 ML: 2.5; .5 SOLUTION RESPIRATORY (INHALATION) at 01:55

## 2024-01-05 RX ADMIN — DOCUSATE SODIUM 50 MG AND SENNOSIDES 8.6 MG 2 TABLET: 8.6; 5 TABLET, FILM COATED ORAL at 05:19

## 2024-01-05 RX ADMIN — DOCUSATE SODIUM 50 MG AND SENNOSIDES 8.6 MG 2 TABLET: 8.6; 5 TABLET, FILM COATED ORAL at 18:29

## 2024-01-05 RX ADMIN — PROPOFOL 40 MCG/KG/MIN: 10 INJECTION, EMULSION INTRAVENOUS at 16:18

## 2024-01-05 RX ADMIN — Medication 100 MCG: at 11:16

## 2024-01-05 RX ADMIN — CEFAZOLIN 2 G: 1 INJECTION, POWDER, FOR SOLUTION INTRAMUSCULAR; INTRAVENOUS at 11:10

## 2024-01-05 RX ADMIN — Medication 300 MCG/HR: at 07:15

## 2024-01-05 RX ADMIN — PROPOFOL 80 MCG/KG/MIN: 10 INJECTION, EMULSION INTRAVENOUS at 01:54

## 2024-01-05 RX ADMIN — LIDOCAINE HYDROCHLORIDE 5 ML: 10 INJECTION, SOLUTION INFILTRATION; PERINEURAL at 09:37

## 2024-01-05 RX ADMIN — FENTANYL CITRATE 100 MCG: 50 INJECTION, SOLUTION INTRAMUSCULAR; INTRAVENOUS at 11:27

## 2024-01-05 RX ADMIN — FENTANYL CITRATE 200 MCG: 50 INJECTION, SOLUTION INTRAMUSCULAR; INTRAVENOUS at 00:14

## 2024-01-05 RX ADMIN — FENTANYL CITRATE 100 MCG: 50 INJECTION, SOLUTION INTRAMUSCULAR; INTRAVENOUS at 20:15

## 2024-01-05 RX ADMIN — DEXTROSE MONOHYDRATE 25 G: 100 INJECTION, SOLUTION INTRAVENOUS at 18:08

## 2024-01-05 RX ADMIN — PROPOFOL 80 MCG/KG/MIN: 10 INJECTION, EMULSION INTRAVENOUS at 09:17

## 2024-01-05 RX ADMIN — AMPICILLIN SODIUM, SULBACTAM SODIUM 3 G: 2; 1 INJECTION, POWDER, FOR SOLUTION INTRAMUSCULAR; INTRAVENOUS at 20:06

## 2024-01-05 RX ADMIN — PROPOFOL 50 MCG/KG/MIN: 10 INJECTION, EMULSION INTRAVENOUS at 23:56

## 2024-01-05 RX ADMIN — ROCURONIUM BROMIDE 50 MG: 10 INJECTION, SOLUTION INTRAVENOUS at 11:10

## 2024-01-05 RX ADMIN — FENTANYL CITRATE 100 MCG: 50 INJECTION, SOLUTION INTRAMUSCULAR; INTRAVENOUS at 20:35

## 2024-01-05 RX ADMIN — FENTANYL CITRATE 100 MCG: 50 INJECTION, SOLUTION INTRAMUSCULAR; INTRAVENOUS at 16:25

## 2024-01-05 RX ADMIN — Medication 250 MCG/HR: at 23:56

## 2024-01-05 RX ADMIN — MIDAZOLAM 2 MG: 1 INJECTION, SOLUTION INTRAMUSCULAR; INTRAVENOUS at 01:21

## 2024-01-05 RX ADMIN — FENTANYL CITRATE 200 MCG: 50 INJECTION, SOLUTION INTRAMUSCULAR; INTRAVENOUS at 23:57

## 2024-01-05 RX ADMIN — IPRATROPIUM BROMIDE AND ALBUTEROL SULFATE 3 ML: 2.5; .5 SOLUTION RESPIRATORY (INHALATION) at 18:36

## 2024-01-05 RX ADMIN — IPRATROPIUM BROMIDE AND ALBUTEROL SULFATE 3 ML: 2.5; .5 SOLUTION RESPIRATORY (INHALATION) at 22:09

## 2024-01-05 RX ADMIN — FENTANYL CITRATE 200 MCG: 50 INJECTION, SOLUTION INTRAMUSCULAR; INTRAVENOUS at 06:51

## 2024-01-05 RX ADMIN — ENOXAPARIN SODIUM 40 MG: 100 INJECTION SUBCUTANEOUS at 17:40

## 2024-01-05 RX ADMIN — SODIUM CHLORIDE, POTASSIUM CHLORIDE, SODIUM LACTATE AND CALCIUM CHLORIDE: 600; 310; 30; 20 INJECTION, SOLUTION INTRAVENOUS at 11:08

## 2024-01-05 RX ADMIN — KETAMINE HYDROCHLORIDE 0.3 MG/KG/HR: 100 INJECTION, SOLUTION, CONCENTRATE INTRAMUSCULAR; INTRAVENOUS at 09:49

## 2024-01-05 RX ADMIN — DEXTROSE MONOHYDRATE 25 G: 100 INJECTION, SOLUTION INTRAVENOUS at 12:06

## 2024-01-05 RX ADMIN — SODIUM CHLORIDE, POTASSIUM CHLORIDE, SODIUM LACTATE AND CALCIUM CHLORIDE 500 ML: 600; 310; 30; 20 INJECTION, SOLUTION INTRAVENOUS at 05:26

## 2024-01-05 RX ADMIN — PROPOFOL 80 MCG/KG/MIN: 10 INJECTION, EMULSION INTRAVENOUS at 05:36

## 2024-01-05 RX ADMIN — FENTANYL CITRATE 150 MCG: 50 INJECTION, SOLUTION INTRAMUSCULAR; INTRAVENOUS at 11:14

## 2024-01-05 RX ADMIN — Medication 50 MCG/HR: at 16:18

## 2024-01-05 RX ADMIN — HALOPERIDOL LACTATE 5 MG: 5 INJECTION, SOLUTION INTRAMUSCULAR at 21:49

## 2024-01-05 RX ADMIN — IPRATROPIUM BROMIDE AND ALBUTEROL SULFATE 3 ML: 2.5; .5 SOLUTION RESPIRATORY (INHALATION) at 14:15

## 2024-01-05 RX ADMIN — FENTANYL CITRATE 200 MCG: 50 INJECTION, SOLUTION INTRAMUSCULAR; INTRAVENOUS at 21:13

## 2024-01-05 RX ADMIN — FAMOTIDINE 20 MG: 10 INJECTION INTRAVENOUS at 17:40

## 2024-01-05 RX ADMIN — FAMOTIDINE 20 MG: 20 TABLET ORAL at 05:19

## 2024-01-05 RX ADMIN — SODIUM CHLORIDE, POTASSIUM CHLORIDE, SODIUM LACTATE AND CALCIUM CHLORIDE 500 ML: 600; 310; 30; 20 INJECTION, SOLUTION INTRAVENOUS at 21:46

## 2024-01-05 RX ADMIN — FENTANYL CITRATE 200 MCG: 50 INJECTION, SOLUTION INTRAMUSCULAR; INTRAVENOUS at 17:59

## 2024-01-05 RX ADMIN — IPRATROPIUM BROMIDE AND ALBUTEROL SULFATE 3 ML: 2.5; .5 SOLUTION RESPIRATORY (INHALATION) at 10:04

## 2024-01-05 RX ADMIN — IOHEXOL 64 ML: 350 INJECTION, SOLUTION INTRAVENOUS at 13:55

## 2024-01-05 RX ADMIN — IPRATROPIUM BROMIDE AND ALBUTEROL SULFATE 3 ML: 2.5; .5 SOLUTION RESPIRATORY (INHALATION) at 06:41

## 2024-01-05 RX ADMIN — NOREPINEPHRINE BITARTRATE 0.02 MCG/KG/MIN: 1 INJECTION, SOLUTION, CONCENTRATE INTRAVENOUS at 09:44

## 2024-01-05 ASSESSMENT — FIBROSIS 4 INDEX: FIB4 SCORE: 0.39

## 2024-01-05 ASSESSMENT — PAIN DESCRIPTION - PAIN TYPE
TYPE: ACUTE PAIN

## 2024-01-05 ASSESSMENT — PAIN SCALES - GENERAL: PAIN_LEVEL: 0

## 2024-01-05 NOTE — PROGRESS NOTES
Patient to pre-op with ACLS RN, RT, and transport. Tolerated trip well, care turned over to Alpesh RN and anesthesia Dr. Ruiz.

## 2024-01-05 NOTE — CONSULTS
DATE OF CONSULTATION:  1/4/2024     REFERRING PHYSICIAN:   Murray Morris M.D.     CONSULTING PHYSICIAN:  Kat Grimm M.D.     REASON FOR CONSULTATION:  I have been asked by  to see the patient in surgical consultation for evaluation of lymphadenopathy.    HISTORY OF PRESENT ILLNESS: The patient is a 33 year-old White woman who is intubated in the intensive care unit after she developed stridor.  Over the past few days she has had worsening shortness of breath apparently.  Her family also states that over the last several months she has had waxing and waning lymphadenopathy in her neck.  Previously she has been seen and had prescriptions for prednisone and antibiotics although these do not seem to help her.  Yesterday she went to [facility] and was noted to have a large mediastinal mass after which she was transferred here.  She now needs a lymph node biopsy for further diagnosis, as this is presumably a malignant process.    PAST MEDICAL HISTORY:  has a past medical history of ASTHMA, Ovarian cyst, and Psychiatric disorder.    PAST SURGICAL HISTORY:  has a past surgical history that includes gyn surgery and other.    ALLERGIES: No Known Allergies    CURRENT MEDICATIONS:    Home Medications    **Home medications have not yet been reviewed for this encounter**         FAMILY HISTORY: family history includes Hypertension in her mother.    SOCIAL HISTORY:  reports that she has been smoking cigarettes. She has a 2.5 pack-year smoking history. She has never used smokeless tobacco. She reports that she does not currently use alcohol. She reports that she does not currently use drugs after having used the following drugs: Inhaled.    REVIEW OF SYSTEMS: Comprehensive review of systems is not able to be elicited from the patient secondary to the acuity of the clinical situation.    PHYSICAL EXAMINATION:    Vitals:    01/04/24 1524   BP: 104/56   Pulse: 93   Resp: 18   SpO2: 99%       Physical  Exam  Vitals and nursing note reviewed. Exam conducted with a chaperone present.   Constitutional:       Appearance: She is ill-appearing.      Comments: Sedated and intubated   HENT:      Head: Normocephalic.      Right Ear: External ear normal.      Left Ear: External ear normal.      Nose: Nose normal.      Mouth/Throat:      Mouth: Mucous membranes are moist.      Comments: Endotracheal tube  Eyes:      Pupils: Pupils are equal, round, and reactive to light.   Neck:      Comments: Large bulky adenopathy  Cardiovascular:      Rate and Rhythm: Normal rate and regular rhythm.      Pulses: Normal pulses.   Pulmonary:      Breath sounds: Wheezing present.      Comments: Intubated on ventilator  Abdominal:      General: Abdomen is flat. There is no distension.      Palpations: Abdomen is soft.   Musculoskeletal:      Comments: Large bulky left axillary lymph nodes   Skin:     General: Skin is warm.      Capillary Refill: Capillary refill takes less than 2 seconds.   Neurological:      General: No focal deficit present.   Psychiatric:      Comments: Cannot assess         LABORATORY VALUES:   Recent Labs     01/03/24 0030 01/04/24  0713   WBC 24.7* 22.5*   RBC 4.14* 4.03*   HEMOGLOBIN 12.4 11.9*   HEMATOCRIT 36.4* 35.9*   MCV 87.9 89.1   MCH 30.0 29.5   MCHC 34.1 33.1   RDW 44.6 47.0   PLATELETCT 430 418   MPV 8.5* 8.5*     Recent Labs     01/03/24 2053 01/04/24  0713   SODIUM 134* 135   POTASSIUM 3.9 4.7   CHLORIDE 96 99   CO2 23 22   GLUCOSE 122* 234*   BUN 12 10   CREATININE 0.82 0.79   CALCIUM 9.4 8.7     Recent Labs     01/03/24 2053 01/04/24  0713   ASTSGOT 26 22   ALTSGPT 19 18   TBILIRUBIN 0.2 <0.2   IBILIRUBIN  --  see below   DBILIRUBIN  --  <0.2   ALKPHOSPHAT 105* 94   GLOBULIN 4.0*  --    INR  --  1.18*     Recent Labs     01/04/24  0713   APTT 31.0   INR 1.18*        IMAGING:   DX-CHEST-PORTABLE (1 VIEW)   Final Result      1.  Interstitial prominence, new from prior exam.   2.  Multifocal mediastinal  prominence as evaluated on CT chest.   3.  Support apparatus as above.          ASSESSMENT AND PLAN:     Lymphadenopathy- (present on admission)  Assessment & Plan  NPO at midnight.   Plan for left axillary excisional lymph node biopsy tomorrow morning.         DISPOSITION: Medical evaluation and admission. The patient was admitted to the Medical Service prior to surgical consultation. [facility] Service will follow.     ____________________________________     Kat Grimm M.D.    DD: 1/4/2024  4:21 PM

## 2024-01-05 NOTE — PROGRESS NOTES
Critical Care Progress Note    Date of admission  1/4/2024    Chief Complaint  33 y.o. female who presented 1/4/2024 with a past medical history of anxiety, asthma, and a history of progressive lymphadenopathy over the last several months to the ED yesterday with acute dyspnea which she described as to the emergency physician as being fairly sudden onset, the patient has had multiple ED visits for respiratory illnesses and exacerbations related to inhalations and exposures and has had increasing episodes over the last month or 2.  In the ED she was reported as having bilateral wheezing tachypnea and accessory muscle use and tripoding, and was treated with bronchodilators, steroids and magnesium and low-dose ketamine.  She was then given some Versed. She was over the course of her ED stay she continued to worsen and have recurrent wheezing and was given epinephrine antihistamines as well as further anxiolytics.  She was also treated with antibiotics for possible pneumonia.  Ultimately she developed some hypoxemia and worsening respiratory distress and being unable to sit upright in and maintain her own airway and with reported intermittent stridor she was intubated.     He had a CT scan of her chest and soft tissue of her neck which was concerning for paratracheal mass, lymphadenopathy in her cervical and mediastinal and axillary region, and was transferred to [facility] for further workup.     At arrival here she is intubated sedated on mechanical ventilation.    Hospital Course  1/4-Admit ICU, IMV, new mediastinal mass and LAD  1/5-BAL done, to OR with ACGS for node biopsy, difficult to sedate on prop and fent, added LDK    Interval Problem Update  Reviewed last 24 hour events:    Very combative and agitated overnight  Versed pushes  200 fentanyl IVP x 2    Neuro:   Propofol @ 80  Fent 300  RASS -3    CV:  HR 80s-90s  SBP     Resp:   APV 28/330/8/0.4  7.38/46/74/94%  CXR stable        I/O: +237  UOP: 1165    Tmax:  AF  Heme: WBC 22.1    Abx:   None    Micro:   NGTD    Endo: BG WTR, ISS    LDA: PIVs, ETT, Poole  SUP: H2RA  VTE: HELD for OR  Diet: NPO    Review of Systems  Review of Systems   Unable to perform ROS: Critical illness        Vital Signs for last 24 hours   Pulse:  [67-99] 91  Resp:  [12-28] 28  BP: ()/(45-69) 145/69  SpO2:  [91 %-100 %] 98 %    Hemodynamic parameters for last 24 hours       Respiratory Information for the last 24 hours  Vent Mode: APVCMV  Rate (breaths/min): 28  Vt Target (mL): 330  PEEP/CPAP: 8  MAP: 14  Control VTE (exp VT): 332    Physical Exam   Physical Exam  Constitutional:       General: She is not in acute distress.     Appearance: She is ill-appearing.   HENT:      Head: Normocephalic.      Mouth/Throat:      Mouth: Mucous membranes are moist.   Eyes:      Extraocular Movements: Extraocular movements intact.      Pupils: Pupils are equal, round, and reactive to light.   Cardiovascular:      Rate and Rhythm: Regular rhythm. Tachycardia present.      Heart sounds: No murmur heard.  Pulmonary:      Effort: Respiratory distress present.      Breath sounds: No wheezing.      Comments: He does have a single wheezing in the center of her chest which is associated with Ventilator cycle, she does not have diffuse wheezing in her distal airways, she has good airflow no obstructive physiology on the ventilator  Musculoskeletal:      Right lower leg: No edema.      Left lower leg: No edema.   Lymphadenopathy:      Cervical: Cervical adenopathy present.   Skin:     General: Skin is warm.      Capillary Refill: Capillary refill takes less than 2 seconds.   Neurological:      General: No focal deficit present.         Medications  Current Facility-Administered Medications   Medication Dose Route Frequency Provider Last Rate Last Admin    ketamine (Ketalar) 500 mg in 250 mL NS infusion (continuous for pain)  0.3 mg/kg/hr Intravenous Continuous Murray Morris M.D. 8.9 mL/hr at 01/05/24 1800 0.3  mg/kg/hr at 01/05/24 1800    haloperidol lactate (Haldol) injection 5 mg  5 mg Intravenous Q4HRS PRN Murray Morris M.D.        norepinephrine (Levophed) 8 mg in 250 mL NS infusion (premix)  0-1 mcg/kg/min (Ideal) Intravenous Continuous Murray Morris M.D.   Dose not Required at 01/05/24 1000    enoxaparin (Lovenox) inj 40 mg  40 mg Subcutaneous DAILY AT 1800 Philip Hebert M.D.   40 mg at 01/05/24 1740    fentaNYL (Sublimaze) injection 100 mcg  100 mcg Intravenous Q15 MIN PRN Murray Morris M.D.   100 mcg at 01/05/24 1625    And    fentaNYL (Sublimaze) injection 200 mcg  200 mcg Intravenous Q15 MIN PRN Murray Morris M.D.   200 mcg at 01/05/24 1759    And    fentaNYL (SUBLIMAZE) 50 mcg/mL in 50mL (Continuous Infusion)   Intravenous Continuous Murray Morris M.D. 3 mL/hr at 01/05/24 1800 150 mcg/hr at 01/05/24 1800    And    propofol (DIPRIVAN) injection  0-80 mcg/kg/min (Ideal) Intravenous Continuous Murray Morris M.D. 16.4 mL/hr at 01/05/24 1800 50 mcg/kg/min at 01/05/24 1800    senna-docusate (Pericolace Or Senokot S) 8.6-50 MG per tablet 2 Tablet  2 Tablet Oral BID Murray Morris M.D.   2 Tablet at 01/05/24 1829    And    polyethylene glycol/lytes (Miralax) Packet 1 Packet  1 Packet Oral QDAY PRN Murray Morris M.D.        And    magnesium hydroxide (Milk Of Magnesia) suspension 30 mL  30 mL Oral QDAY PRN Murray Morris M.D.        And    bisacodyl (Dulcolax) suppository 10 mg  10 mg Rectal QDAY PRN Murray Morris M.D.        insulin regular (HumuLIN R,NovoLIN R) injection  2-9 Units Subcutaneous Q6HRS Murray Morris M.D.   3 Units at 01/04/24 0733    And    dextrose 10 % BOLUS 25 g  25 g Intravenous Q15 MIN PRN Murray Morris M.D. 999 mL/hr at 01/05/24 1808 25 g at 01/05/24 1808    Respiratory Therapy Consult   Nebulization Continuous RT Murray Morris M.D.        famotidine (Pepcid) tablet 20 mg  20 mg Enteral Tube Q12HRS Murray Morris M.D.   20 mg at  01/05/24 0519    Or    famotidine (Pepcid) injection 20 mg  20 mg Intravenous Q12HRS Murray Morris M.D.   20 mg at 01/05/24 1740    MD Alert...ICU Electrolyte Replacement per Pharmacy   Other PHARMACY TO DOSE Murray Morris M.D.        lidocaine (Xylocaine) 1 % injection 2 mL  2 mL Tracheal Tube Q30 MIN PRN Murray Morris M.D.   5 mL at 01/05/24 0937    ipratropium-albuterol (DUONEB) nebulizer solution  3 mL Nebulization Q4HRS (RT) Murray Morris M.D.   3 mL at 01/05/24 1836       Fluids    Intake/Output Summary (Last 24 hours) at 1/5/2024 1851  Last data filed at 1/5/2024 1800  Gross per 24 hour   Intake 1237.45 ml   Output 1265 ml   Net -27.55 ml       Laboratory  Recent Labs     01/04/24  2349 01/05/24  0143 01/05/24  0356   ISTATAPH 7.342* 7.307* 7.376*   ISTATAPCO2 50.9* 56.8* 46.4*   ISTATAPO2 54* 72 74   ISTATATCO2 29 30 29   [id number] 85* 92* 94   ISTATARTHCO3 27.6* 28.4* 27.2*   ISTATARTBE 1 1 2   ISTATTEMP 98.2 F 98.6 F 98.1 F   ISTATFIO2 40 40 40   ISTATSPEC Arterial Arterial Arterial   ISTATAPHTC 7.345* 7.307* 7.380*   [id number] 53* 72 73         Recent Labs     01/03/24 2053 01/04/24  0713 01/05/24  0202   SODIUM 134* 135 137   POTASSIUM 3.9 4.7 4.7   CHLORIDE 96 99 102   CO2 23 22 26   BUN 12 10 11   CREATININE 0.82 0.79 0.66   MAGNESIUM  --   --  2.3   PHOSPHORUS  --   --  3.5   CALCIUM 9.4 8.7 8.6     Recent Labs     01/03/24 2053 01/04/24  0713 01/05/24  0202   ALTSGPT 19 18 18   ASTSGOT 26 22 19   ALKPHOSPHAT 105* 94 85   TBILIRUBIN 0.2 <0.2 0.2   DBILIRUBIN  --  <0.2  --    GLUCOSE 122* 234* 100*     Recent Labs     01/03/24  0030 01/03/24 2053 01/04/24  0713 01/05/24  0202 01/05/24  1000   WBC 24.7*  --  22.5* 22.1*  --    NEUTSPOLYS 95.60*  --  93.90* 89.10*  --    LYMPHOCYTES 2.10*  --  4.40* 3.70*  --    MONOCYTES 1.30  --  1.70 6.00  --    EOSINOPHILS 0.20  --  0.00 0.30 2   BASOPHILS 0.20  --  0.00 0.40  --    ASTSGOT  --  26 22 19  --    ALTSGPT  --  19 18 18  --     ALKPHOSPHAT  --  105* 94 85  --    TBILIRUBIN  --  0.2 <0.2 0.2  --      Recent Labs     01/03/24  0030 01/04/24  0713 01/05/24  0202 01/05/24  0355   RBC 4.14* 4.03* 3.53*  --    HEMOGLOBIN 12.4 11.9* 10.7*  --    HEMATOCRIT 36.4* 35.9* 32.8*  --    PLATELETCT 430 418 363  --    PROTHROMBTM  --  15.2*  --  15.2*   APTT  --  31.0  --   --    INR  --  1.18*  --  1.18*       Imaging  X-Ray:  I have personally reviewed the images and compared with prior images.    Assessment/Plan  * Acute respiratory failure with hypoxia (HCC)- (present on admission)  Assessment & Plan  Intubated 1/4 at outside facility for refractory respiratory distress, stridor and wheezing    Ventilator dependent respiratory failure    RT/O2 protocols  Daily and PRN VBG/ABGs  Titration of ventilator to optimize lung protection, gas exchange and acid-base status   Sedation as tolerated/indicated  Daily CXR  HOB >30 degrees and chlorhexidine for VAP prevention  Pepcid for GI prophylaxis  SAT/SBT when able (ABCDEF Bundle)  Early mobility        Lymphadenopathy- (present on admission)  Assessment & Plan  1/4 Chest CT with peritracheal, cervical and axillary LAD, with multi-station mediastinal LAD  Peritracheal mass represents a LN, likely lymphproliferative disorder    Onc consult  ACGS consult to perform excisional biopsy tomorrow (NPO tonight)  TLS labs        Asthma- (present on admission)  Assessment & Plan  Reported wheezing at outside hospital  S/P nebs and steroids    No significant bronchspasm here and no obstructive physiology on the vent  Duonebs for now  Hold steroids so as to not obscure biopsy results           VTE:  Lovenox  Ulcer: H2 Antagonist  Lines: Poole Catheter  Ongoing indication addressed    I have performed a physical exam and reviewed and updated ROS and Plan today (1/5/2024). In review of yesterday's note (1/4/2024), there are no changes except as documented above.     Discussed patient condition and risk of morbidity and/or  mortality with Family, RN, RT, Therapies, Pharmacy, and Patient  The patient remains critically ill.  Critical care time = 45 minutes in directly providing and coordinating critical care and extensive data review.  No time overlap and excludes procedures.

## 2024-01-05 NOTE — CARE PLAN
Problem: Pain - Standard  Goal: Alleviation of pain or a reduction in pain to the patient’s comfort goal  Outcome: Progressing  Patient on continuous pain medication infusion.     The patient is Watcher - Medium risk of patient condition declining or worsening    Shift Goals  Clinical Goals: RASS -1 to +1, Safety  Patient Goals: YOLY  Family Goals: Updates    Progress made toward(s) clinical / shift goals:  Progressing    Patient is not progressing towards the following goals: N/A      Problem: Safety - Medical Restraint  Goal: Free from restraint(s) (Restraint for Interference with Medical Device)  Outcome: Not Progressing   Risk of removal of life saving device.

## 2024-01-05 NOTE — ANESTHESIA POSTPROCEDURE EVALUATION
Patient: Sol Conntto    Procedure Summary       Date: 01/05/24 Room / Location: [facility] 08 / SURGERY [facility]    Anesthesia Start: 1108 Anesthesia Stop: 1151    Procedure: BIOPSY, LYMPH NODE (Left: Axilla) Diagnosis: (Lymphadenopathy)    Surgeons: Philip Hernandez M.D. Responsible Provider: Shawn Ruiz M.D.    Anesthesia Type: general ASA Status: 4            Final Anesthesia Type: general  Last vitals  BP   Blood Pressure: 99/54    Temp        Pulse   85   Resp   (!) 28    SpO2   100 %      Anesthesia Post Evaluation    Patient location during evaluation: ICU  Patient participation: complete - patient participated  Level of consciousness: awake and alert  Pain score: 0    Airway patency: patent  Anesthetic complications: no  Cardiovascular status: adequate and hemodynamically stable  Respiratory status: acceptable  Hydration status: acceptable    PONV: none          No notable events documented.     Nurse Pain Score: 3 (NPRS)

## 2024-01-05 NOTE — PROGRESS NOTES
Oncology/Hematology Progress Note               Author: Philip Hebert M.D. Date & Time created: 1/5/2024  1:08 PM     Interval History:  Patient had excisional lymph node biopsy from left axilla, also had bronchoscopy this morning with mucus plugging    Review of Systems:  Review of Systems   Unable to perform ROS: Intubated       Physical Exam:  Physical Exam  Vitals and nursing note reviewed.   HENT:      Head: Normocephalic and atraumatic.      Right Ear: External ear normal.      Left Ear: External ear normal.      Nose: Nose normal.      Mouth/Throat:      Mouth: Mucous membranes are moist.      Pharynx: Oropharynx is clear.   Eyes:      Extraocular Movements: Extraocular movements intact.      Conjunctiva/sclera: Conjunctivae normal.      Pupils: Pupils are equal, round, and reactive to light.   Cardiovascular:      Rate and Rhythm: Normal rate and regular rhythm.      Pulses: Normal pulses.      Heart sounds: Normal heart sounds.   Pulmonary:      Effort: Pulmonary effort is normal.      Breath sounds: Normal breath sounds.   Abdominal:      General: Abdomen is flat. Bowel sounds are normal.      Palpations: Abdomen is soft.   Musculoskeletal:         General: Normal range of motion.      Cervical back: Normal range of motion.   Lymphadenopathy:      Cervical: Cervical adenopathy present.   Skin:     General: Skin is warm and dry.   Neurological:      Mental Status: She is disoriented.      Coordination: Coordination abnormal.         Labs:  Recent Labs     01/04/24  2349 01/05/24  0143 01/05/24  0356   ISTATAPH 7.342* 7.307* 7.376*   ISTATAPCO2 50.9* 56.8* 46.4*   ISTATAPO2 54* 72 74   ISTATATCO2 29 30 29   [id number] 85* 92* 94   ISTATARTHCO3 27.6* 28.4* 27.2*   ISTATARTBE 1 1 2   ISTATTEMP 98.2 F 98.6 F 98.1 F   ISTATFIO2 40 40 40   ISTATSPEC Arterial Arterial Arterial   ISTATAPHTC 7.345* 7.307* 7.380*   [id number] 53* 72 73         Recent Labs     01/03/24 2053 01/04/24  0713 01/05/24  0202   SODIUM  134* 135 137   POTASSIUM 3.9 4.7 4.7   CHLORIDE 96 99 102   CO2  26   BUN 12 10 11   CREATININE 0.82 0.79 0.66   MAGNESIUM  --   --  2.3   PHOSPHORUS  --   --  3.5   CALCIUM 9.4 8.7 8.6     Recent Labs     24  020   ALTSGPT  18 18   ASTSGOT  19   ALKPHOSPHAT 105* 94 85   TBILIRUBIN 0.2 <0.2 0.2   DBILIRUBIN  --  <0.2  --    GLUCOSE 122* 234* 100*     Recent Labs     24  0355   RBC 4.14* 4.03* 3.53*  --    HEMOGLOBIN 12.4 11.9* 10.7*  --    HEMATOCRIT 36.4* 35.9* 32.8*  --    PLATELETCT 430 418 363  --    PROTHROMBTM  --  15.2*  --  15.2*   APTT  --  31.0  --   --    INR  --  1.18*  --  1.18*     Recent Labs     24  1000   WBC 24.7*  --  22.5* 22.1*  --    NEUTSPOLYS 95.60*  --  93.90* 89.10*  --    LYMPHOCYTES 2.10*  --  4.40* 3.70*  --    MONOCYTES 1.30  --  1.70 6.00  --    EOSINOPHILS 0.20  --  0.00 0.30 2   BASOPHILS 0.20  --  0.00 0.40  --    ASTSGOT  --  26 22 19  --    ALTSGPT  --  19 18 18  --    ALKPHOSPHAT  --  105* 94 85  --    TBILIRUBIN  --  0.2 <0.2 0.2  --      Recent Labs     24   SODIUM 134* 135 137   POTASSIUM 3.9 4.7 4.7   CHLORIDE 96 99 102   CO2  22 26   GLUCOSE 122* 234* 100*   BUN 12 10 11   CREATININE 0.82 0.79 0.66   CALCIUM 9.4 8.7 8.6     Hemodynamics:  No data recorded.  Monitored Temp: 35.9 °C (96.6 °F)  Pulse  Av.3  Min: 67  Max: 103   Blood Pressure: 99/54     Respiratory:  Vent Mode: APVCMV, Rate (breaths/min): 28, PEEP/CPAP: 8, PIP: 38, MAP: 15 Respiration: (!) 28, Pulse Oximetry: 100 %     Work Of Breathing / Effort: Vented  RUL Breath Sounds: Clear, RML Breath Sounds: Clear;Diminished, RLL Breath Sounds: Diminished, IVAN Breath Sounds: Clear, LLL Breath Sounds: Diminished  Fluids:    Intake/Output Summary (Last 24 hours) at 2024 1308  Last data filed at 2024  1151  Gross per 24 hour   Intake 975.92 ml   Output 1670 ml   Net -694.08 ml     Weight: 59.3 kg (130 lb 11.7 oz)  GI/Nutrition:  Orders Placed This Encounter   Procedures    Diet NPO Restrict to: Strict (okay to receive meds through the NG/OG tube)     Standing Status:   Standing     Number of Occurrences:   1     Order Specific Question:   Type:     Answer:   Now [1]     Order Specific Question:   Diet NPO Restrict to:     Answer:   Strict [1]     Comments:   okay to receive meds through the NG/OG tube     Medical Decision Making, by Problem:  Active Hospital Problems    Diagnosis     *Acute respiratory failure with hypoxia (HCC) [J96.01]     Lymphadenopathy [R59.1]     Pulmonary emboli (HCC) [I26.99]     Asthma [J45.909]        Plan:  Lymphoma - awaiting pathology from excisional lymph node biopsy. May need to consider initial cycle of chemotherapy or single agent Rituxan to debulk disease. Eventually would benefit from PET/CT and bone marrow biopsy to complete staging. LP may also be indicated depending on pathology.  Respiratory failure - ventilator management per critical care      Case discussed with RN at bedside     Quality-Core Measures   Poole catheter::  Critically Ill - Requiring Accurate Measurement of Urinary Output  DVT prophylaxis pharmacological::  Enoxaparin (Lovenox)  DVT prophylaxis - mechanical:  SCDs  Assessed for rehabilitation services:  Patient unable to tolerate rehabilitation therapeutic regimen    Philip Hebert M.D.

## 2024-01-05 NOTE — PROGRESS NOTES
"  DATE: 1/5/2024      INTERVAL EVENTS:  No major events      PHYSICAL EXAMINATION:  Vital Signs: BP (!) 89/50   Pulse 82   Resp (!) 28   Ht 1.626 m (5' 4.02\")   Wt 59.3 kg (130 lb 11.7 oz)   SpO2 95%   Intubated and sedated  Left axillary mass at the tail of the breast,     LABORATORY VALUES:   Recent Labs     01/03/24 0030 01/04/24 0713 01/05/24  0202   WBC 24.7* 22.5* 22.1*   RBC 4.14* 4.03* 3.53*   HEMOGLOBIN 12.4 11.9* 10.7*   HEMATOCRIT 36.4* 35.9* 32.8*   MCV 87.9 89.1 92.9   MCH 30.0 29.5 30.3   MCHC 34.1 33.1 32.6   RDW 44.6 47.0 51.8*   PLATELETCT 430 418 363   MPV 8.5* 8.5* 8.6*     Recent Labs     01/03/24 2053 01/04/24 0713 01/05/24  0202   SODIUM 134* 135 137   POTASSIUM 3.9 4.7 4.7   CHLORIDE 96 99 102   CO2 23 22 26   GLUCOSE 122* 234* 100*   BUN 12 10 11   CREATININE 0.82 0.79 0.66   CALCIUM 9.4 8.7 8.6     Recent Labs     01/03/24 2053 01/04/24 0713 01/05/24 0202 01/05/24  0355   ASTSGOT 26 22 19  --    ALTSGPT 19 18 18  --    TBILIRUBIN 0.2 <0.2 0.2  --    IBILIRUBIN  --  see below  --   --    DBILIRUBIN  --  <0.2  --   --    ALKPHOSPHAT 105* 94 85  --    GLOBULIN 4.0*  --  3.5  --    INR  --  1.18*  --  1.18*     Recent Labs     01/04/24 0713 01/05/24 0355   APTT 31.0  --    INR 1.18* 1.18*        IMAGING:   DX-CHEST-PORTABLE (1 VIEW)   Final Result         1. No significant interval change.      DX-CHEST-PORTABLE (1 VIEW)   Final Result      1.  Interstitial prominence, new from prior exam.   2.  Multifocal mediastinal prominence as evaluated on CT chest.   3.  Support apparatus as above.      US-EXTREMITY VENOUS LOWER BILAT    (Results Pending)   EC-ECHOCARDIOGRAM COMPLETE W/O CONT    (Results Pending)       ASSESSMENT AND PLAN:  Lymphadenopathy    - Reviewed imaging and physical exam  - Safest readily accessible node for biopsy is the left axilla  - LEFT  axillary excisional lymph node biopsy today.    Appreciate ICU and consulting physician care.       " ____________________________________     Philip Hernandez M.D.    DD: 1/5/2024  8:23 AM

## 2024-01-05 NOTE — OR NURSING
Pt arrived with mother at bedside from ICU. Consents signed by pt's mother. All questions answered. Armband and allergies verified with two Rns. Handoff completed with OR RN.

## 2024-01-05 NOTE — CARE PLAN
The patient is Watcher - Medium risk of patient condition declining or worsening    Shift Goals  Clinical Goals: RASS 0 to +1, airway safety  Patient Goals: YOLY  Family Goals: updates by MDs, plan of care/tests/procedures to help diagnose    Progress made toward(s) clinical / shift goals:     VSS      Problem: Pain - Standard  Goal: Alleviation of pain or a reduction in pain to the patient’s comfort goal  Outcome: Progressing       Patient is not progressing towards the following goals:    Problem: Safety - Medical Restraint  Goal: Remains free of injury from restraints (Restraint for Interference with Medical Device)  Outcome: Not Met  Goal: Free from restraint(s) (Restraint for Interference with Medical Device)  Outcome: Not Met   Pt very purposeful and attempts to remove ETT, mittens added for airway safety as needed.     Problem: Knowledge Deficit - Standard  Goal: Patient and family/care givers will demonstrate understanding of plan of care, disease process/condition, diagnostic tests and medications  Outcome: Not Progressing     Problem: Fall Risk  Goal: Patient will remain free from falls  Outcome: Not Progressing

## 2024-01-05 NOTE — ASSESSMENT & PLAN NOTE
"Uncertain diagnosis  CT chest read as upper lobe subsegmental PEs as \"questionable\" an \"equivocal\"  Did not receive AC prior to arrival    Not clear if actual diagnosis    Obtain CHELSI delgadillo    Will hold AC for now given need for surgical biopsy  Obtain TTE        "

## 2024-01-05 NOTE — CARE PLAN
Problem: Bronchoconstriction  Goal: Improve in air movement and diminished wheezing  Description: Target End Date:  2 to 3 days    1.  Implement inhaled treatments  2.  Evaluate and manage medication effects  Outcome: Progressing   Duo Q4    Problem: Ventilation  Goal: Ability to achieve and maintain unassisted ventilation or tolerate decreased levels of ventilator support  Description: Target End Date:  4 days     Document on Vent flowsheet    1.  Support and monitor invasive and noninvasive mechanical ventilation  2.  Monitor ventilator weaning response  3.  Perform ventilator associated pneumonia prevention interventions  4.  Manage ventilation therapy by monitoring diagnostic test results  Outcome: Progressing     Ventilator Daily Summary    Vent Day #2    Airway: 7.5/23    Ventilator settings: 22/330/8/40    Weaning trials:     Respiratory Procedures:     Plan: Continue current ventilator settings and wean mechanical ventilation as tolerated per physician orders.

## 2024-01-05 NOTE — OP REPORT
DATE OF OPERATION:   1/5/2024     PREOPERATIVE DIAGNOSIS: Lymphadenopathy    POSTOPERATIVE DIAGNOSIS: Lymphadenopathy.     PROCEDURE PERFORMED: Left axillary excisional lymph node biopsy    SURGEON:    Philip Hernandez M.D.    ASSISTANT:    TYSON Logan.    ANESTHESIOLOGIST:  Shawn Ruiz M.D.     ANESTHESIA:   General endotracheal anesthesia.    ASA CLASSIFICATION:  IV.    INDICATIONS: The patient is a 33  year-old woman with  clinical and radiographic findings of lymphadenopathy . She is taken to the operating room for excisional lymph node biopsy of the left axilla    The nature of the surgical procedure warranted additional skilled operative assistance from an Advanced Registered Nurse Practitioner (ARNP). The assistant was present during the entire operation. The surgical assistant performed the following: provided assistance with optimal surgical exposure of the operative field and provided high complexity, subspecialty decision making input.    FINDINGS: multiple enlarged left axillary nodes.  Node excised intact, hilum suture ligated.    WOUND CLASSIFICATION:  Class I, Clean.    SPECIMEN:     Left axillary lymph node.    ESTIMATED BLOOD LOSS:   5 mL.     PROCEDURE: Following informed consent consent, the patient was properly identified, taken to the operating room and placed in supine position where general endotracheal anesthesia was administered Intravenous antibiotics were administered by the anesthesiologist in correct time interval. The patient arrived with a previously placed Poole catheter. Sequential compression devices were employed. The left axilla was prepped and draped into a sterile field. A timeout was conducted with the full attention and participation of all operating room personnel.    Local was instilled over the proposed incision site.  A 3 cm incision was created using the #10 scalpel.  This was carried down through subcutaneous fatty tissue using the Bovie cautery.  The  clavi pectoral fascia was opened.  A large node like mass erupted through the fascial defect.  This node was grasped and circumferentially cleared of  attachments to surrounding fatty tissue.  The node hilum was identified and ligated with 3-0 vicryl suture.  The specimen was fully amputated and passed off the field.  The wound was irrigated and hemostasis was achieved.  The fascial layer was re-approximated with running 3-0 Vicryl and skin with 4-0 Monocryl and Dermabond.        The patient tolerated the procedure well, and there were no apparent complications. All sponge, needle, and instrument counts were correct on 2 separate occasions. The patient remains intubated and will transfer back to the medical ICU in satisfactory condition     ____________________________________     Philip Hernandez M.D.    DD: 1/5/2024  11:28 AM

## 2024-01-05 NOTE — HOSPITAL COURSE
Ms. Jones is a 33 year old female with the past medical history of anxiety, asthma, and a history of progressive lymphadenopathy over the last several months who presented to the ED on 1/4/2024 with acute dyspnea. The patient has had multiple ED visits for respiratory illnesses and exacerbations related to inhalations and exposures and has had increasing episodes over the last month.  In the ED she was reported as having bilateral wheezing, tachypnea, and accessory muscle use and tripoding, and was treated with bronchodilators, steroids and magnesium and low-dose ketamine.  Ultimately, the patient developed hypoxemia with worsening respiratory distress requiring intubation.  She underwent evaluation with CT scan of her chest and soft tissue of her neck which was concerning for paratracheal mass, lymphadenopathy in her cervical and mediastinal and axillary region.  She was transferred to [facility] for subspeciality care and admitted to the ICU.  1/4-Admit ICU, IMV, new mediastinal mass and LAD  1/5-BAL done, to OR with ACGS for node biopsy, difficult to sedate on prop and fent, added LDK, BAL + empiric abx for dense secretions and possible PNA  1/6-VD 3, failed SAT multiple times today for agitation and desaturations, very difficult to sedate, started steroids for RAD and possible bronchospasm  1/7-VD 4, unable to SAT this AM due to severe agitation and desats, will transition to LDK and dex today, with adjunct haldol and seroquel as need to try to SBT.  1/8 - VD5, SAT/SBT-->extubated, titrating precedex, started prn Xanax due to severe anxiety, pathology (+) for Hodgkin's lymphoma

## 2024-01-05 NOTE — PROGRESS NOTES
Pt returned from OR via bed; on monitor, BVM; IVF infusing. Pt connected to unit monitor and vent.

## 2024-01-05 NOTE — ASSESSMENT & PLAN NOTE
Biopsies confirm Hodgkin's lymphoma  Heme/Onc following  PICC line to be placed today  Cont steroids  PET scan on 1/10 for staging  Chemo induction at this hospitalization

## 2024-01-05 NOTE — ANESTHESIA PREPROCEDURE EVALUATION
Case: [id number] Date/Time: 01/05/24 1031    Procedure: BIOPSY, LYMPH NODE (Left: Axilla)    Location: [facility] OR 08 / SURGERY [facility]    Surgeons: Philip Hernandez M.D.            Relevant Problems   PULMONARY   (positive) Asthma      CARDIAC   (positive) Pulmonary emboli (HCC)       Physical Exam    Anesthesia Plan    ASA 4       Plan - general       Airway plan will be ETT          Induction: intravenous    Postoperative Plan: Postoperative administration of opioids is intended.    Pertinent diagnostic labs and testing reviewed    Informed Consent:    Anesthetic plan and risks discussed with patient.    Use of blood products discussed with: patient whom consented to blood products.

## 2024-01-05 NOTE — PROGRESS NOTES
Hypoglycemia Intervention    Hypoglycemia protocol intervention:  Blood glucose 70 at 1204.  Intervention: 25 g IV dextrose per MAR   Repeat blood glucose 144 at 1228.  Intervention: Patient NPO, recheck blood glucose in 1 hour   Additional interventions needed: n/a  Dr. Morris notified of the above at 1228.

## 2024-01-05 NOTE — PROGRESS NOTES
0940 Time out for Bronchoscopy    0942 Procedure started    0958 Procedure End    0959 Physician Out

## 2024-01-05 NOTE — CARE PLAN
The patient is Watcher - Medium risk of patient condition declining or worsening    Shift Goals  Clinical Goals: Respiratory and hemodynamic stability, wean sedation while maintaining safety  Patient Goals: YOLY  Family Goals: updates    Progress made toward(s) clinical / shift goals:  respiratory/hemodynamic stability, wean sedation     Patient is not progressing towards the following goals: n/a      Problem: Pain - Standard  Goal: Alleviation of pain or a reduction in pain to the patient’s comfort goal  Outcome: Progressing     Problem: Safety - Medical Restraint  Goal: Remains free of injury from restraints (Restraint for Interference with Medical Device)  Outcome: Progressing  Goal: Free from restraint(s) (Restraint for Interference with Medical Device)  Outcome: Progressing

## 2024-01-05 NOTE — ASSESSMENT & PLAN NOTE
Intubated 1/4 at outside facility for refractory respiratory distress, stridor and wheezing  Extubated on 1/8  RT/O2 protocols  IS/early mobility

## 2024-01-05 NOTE — ANESTHESIA TIME REPORT
Anesthesia Start and Stop Event Times       Date Time Event    1/5/2024 1026 Ready for Procedure     1108 Anesthesia Start     1151 Anesthesia Stop          Responsible Staff  01/05/24      Name Role Begin End    Shawn Ruiz M.D. Anesth 1108 1151          Overtime Reason:  no overtime (within assigned shift)    Comments:

## 2024-01-05 NOTE — ANESTHESIA PREPROCEDURE EVALUATION
Case: [id number] Date/Time: 01/05/24 1031    Procedure: BIOPSY, LYMPH NODE (Left: Axilla)    Location: [facility] OR 08 / SURGERY [facility]    Surgeons: Philip Hernandez M.D.            Relevant Problems   PULMONARY   (positive) Asthma      CARDIAC   (positive) Pulmonary emboli (HCC)       Physical Exam    Airway   Mallampati: II  TM distance: >3 FB  Neck ROM: full       Cardiovascular - normal exam  Rhythm: regular  Rate: normal  (-) murmur     Dental - normal exam           Pulmonary - normal exam  Breath sounds clear to auscultation  (+) rhonchi     Abdominal    Neurological - normal exam and sedated/unconcious                   Anesthesia Plan

## 2024-01-06 ENCOUNTER — APPOINTMENT (OUTPATIENT)
Dept: RADIOLOGY | Facility: MEDICAL CENTER | Age: 34
DRG: 823 | End: 2024-01-06
Attending: EMERGENCY MEDICINE
Payer: MEDICAID

## 2024-01-06 LAB
ALBUMIN SERPL BCP-MCNC: 2.6 G/DL (ref 3.2–4.9)
ALBUMIN/GLOB SERPL: 0.8 G/DL
ALP SERPL-CCNC: 77 U/L (ref 30–99)
ALT SERPL-CCNC: 12 U/L (ref 2–50)
ANION GAP SERPL CALC-SCNC: 10 MMOL/L (ref 7–16)
ARTERIAL PATENCY WRIST A: ABNORMAL
AST SERPL-CCNC: 22 U/L (ref 12–45)
B PARAP IS1001 DNA NPH QL NAA+NON-PROBE: NOT DETECTED
B PERT.PT PRMT NPH QL NAA+NON-PROBE: NOT DETECTED
BASE EXCESS BLDA CALC-SCNC: 2 MMOL/L (ref -4–3)
BASOPHILS # BLD AUTO: 0.4 % (ref 0–1.8)
BASOPHILS # BLD: 0.04 K/UL (ref 0–0.12)
BILIRUB SERPL-MCNC: <0.2 MG/DL (ref 0.1–1.5)
BODY TEMPERATURE: ABNORMAL DEGREES
BREATHS SETTING VENT: 28
BUN SERPL-MCNC: 7 MG/DL (ref 8–22)
C PNEUM DNA NPH QL NAA+NON-PROBE: NOT DETECTED
CALCIUM ALBUM COR SERPL-MCNC: 9.2 MG/DL (ref 8.5–10.5)
CALCIUM SERPL-MCNC: 8.1 MG/DL (ref 8.5–10.5)
CHLORIDE SERPL-SCNC: 106 MMOL/L (ref 96–112)
CO2 BLDA-SCNC: 29 MMOL/L (ref 20–33)
CO2 SERPL-SCNC: 26 MMOL/L (ref 20–33)
CREAT SERPL-MCNC: 0.59 MG/DL (ref 0.5–1.4)
DELSYS IDSYS: ABNORMAL
END TIDAL CARBON DIOXIDE IECO2: 33 MMHG
EOSINOPHIL # BLD AUTO: 0.47 K/UL (ref 0–0.51)
EOSINOPHIL NFR BLD: 4.3 % (ref 0–6.9)
ERYTHROCYTE [DISTWIDTH] IN BLOOD BY AUTOMATED COUNT: 53.3 FL (ref 35.9–50)
FLUAV RNA NPH QL NAA+NON-PROBE: NOT DETECTED
FLUBV RNA NPH QL NAA+NON-PROBE: NOT DETECTED
GFR SERPLBLD CREATININE-BSD FMLA CKD-EPI: 122 ML/MIN/1.73 M 2
GLOBULIN SER CALC-MCNC: 3.3 G/DL (ref 1.9–3.5)
GLUCOSE BLD STRIP.AUTO-MCNC: 100 MG/DL (ref 65–99)
GLUCOSE BLD STRIP.AUTO-MCNC: 141 MG/DL (ref 65–99)
GLUCOSE BLD STRIP.AUTO-MCNC: 85 MG/DL (ref 65–99)
GLUCOSE BLD STRIP.AUTO-MCNC: 96 MG/DL (ref 65–99)
GLUCOSE BLD STRIP.AUTO-MCNC: 99 MG/DL (ref 65–99)
GLUCOSE SERPL-MCNC: 96 MG/DL (ref 65–99)
GRAM STN SPEC: NORMAL
HADV DNA NPH QL NAA+NON-PROBE: NOT DETECTED
HCO3 BLDA-SCNC: 27.2 MMOL/L (ref 17–25)
HCOV 229E RNA NPH QL NAA+NON-PROBE: NOT DETECTED
HCOV HKU1 RNA NPH QL NAA+NON-PROBE: NOT DETECTED
HCOV NL63 RNA NPH QL NAA+NON-PROBE: NOT DETECTED
HCOV OC43 RNA NPH QL NAA+NON-PROBE: NOT DETECTED
HCT VFR BLD AUTO: 32.1 % (ref 37–47)
HGB BLD-MCNC: 10 G/DL (ref 12–16)
HMPV RNA NPH QL NAA+NON-PROBE: NOT DETECTED
HOROWITZ INDEX BLDA+IHG-RTO: 164 MM[HG]
HPIV1 RNA NPH QL NAA+NON-PROBE: NOT DETECTED
HPIV2 RNA NPH QL NAA+NON-PROBE: NOT DETECTED
HPIV3 RNA NPH QL NAA+NON-PROBE: NOT DETECTED
HPIV4 RNA NPH QL NAA+NON-PROBE: NOT DETECTED
IMM GRANULOCYTES # BLD AUTO: 0.02 K/UL (ref 0–0.11)
IMM GRANULOCYTES NFR BLD AUTO: 0.2 % (ref 0–0.9)
INR PPP: 1.17 (ref 0.87–1.13)
LACTATE BLD-SCNC: 0.8 MMOL/L (ref 0.5–2)
LYMPHOCYTES # BLD AUTO: 1.03 K/UL (ref 1–4.8)
LYMPHOCYTES NFR BLD: 9.3 % (ref 22–41)
M PNEUMO DNA NPH QL NAA+NON-PROBE: NOT DETECTED
MAGNESIUM SERPL-MCNC: 1.7 MG/DL (ref 1.5–2.5)
MCH RBC QN AUTO: 29.9 PG (ref 27–33)
MCHC RBC AUTO-ENTMCNC: 31.2 G/DL (ref 32.2–35.5)
MCV RBC AUTO: 95.8 FL (ref 81.4–97.8)
MODE IMODE: ABNORMAL
MONOCYTES # BLD AUTO: 0.9 K/UL (ref 0–0.85)
MONOCYTES NFR BLD AUTO: 8.2 % (ref 0–13.4)
NEUTROPHILS # BLD AUTO: 8.58 K/UL (ref 1.82–7.42)
NEUTROPHILS NFR BLD: 77.6 % (ref 44–72)
NRBC # BLD AUTO: 0 K/UL
NRBC BLD-RTO: 0 /100 WBC (ref 0–0.2)
O2/TOTAL GAS SETTING VFR VENT: 50 %
PCO2 BLDA: 46.6 MMHG (ref 26–37)
PCO2 TEMP ADJ BLDA: 45.6 MMHG (ref 26–37)
PEEP END EXPIRATORY PRESSURE IPEEP: 8 CMH20
PH BLDA: 7.37 [PH] (ref 7.4–7.5)
PH TEMP ADJ BLDA: 7.38 [PH] (ref 7.4–7.5)
PHOSPHATE SERPL-MCNC: 2.8 MG/DL (ref 2.5–4.5)
PLATELET # BLD AUTO: 363 K/UL (ref 164–446)
PMV BLD AUTO: 8.9 FL (ref 9–12.9)
PO2 BLDA: 82 MMHG (ref 64–87)
PO2 TEMP ADJ BLDA: 80 MMHG (ref 64–87)
POTASSIUM SERPL-SCNC: 4.2 MMOL/L (ref 3.6–5.5)
PROT SERPL-MCNC: 5.9 G/DL (ref 6–8.2)
PROTHROMBIN TIME: 15 SEC (ref 12–14.6)
RBC # BLD AUTO: 3.35 M/UL (ref 4.2–5.4)
RHODAMINE-AURAMINE STN SPEC: NORMAL
RSV RNA NPH QL NAA+NON-PROBE: NOT DETECTED
RV+EV RNA NPH QL NAA+NON-PROBE: NOT DETECTED
SAO2 % BLDA: 96 % (ref 93–99)
SARS-COV-2 RNA NPH QL NAA+NON-PROBE: NOTDETECTED
SIGNIFICANT IND 70042: NORMAL
SIGNIFICANT IND 70042: NORMAL
SITE SITE: NORMAL
SITE SITE: NORMAL
SODIUM SERPL-SCNC: 142 MMOL/L (ref 135–145)
SOURCE SOURCE: NORMAL
SOURCE SOURCE: NORMAL
SPECIMEN DRAWN FROM PATIENT: ABNORMAL
TIDAL VOLUME IVT: 330 ML
URATE SERPL-MCNC: 2.8 MG/DL (ref 1.9–8.2)
WBC # BLD AUTO: 11 K/UL (ref 4.8–10.8)

## 2024-01-06 PROCEDURE — 99291 CRITICAL CARE FIRST HOUR: CPT | Performed by: EMERGENCY MEDICINE

## 2024-01-06 PROCEDURE — 87633 RESP VIRUS 12-25 TARGETS: CPT

## 2024-01-06 PROCEDURE — 84550 ASSAY OF BLOOD/URIC ACID: CPT

## 2024-01-06 PROCEDURE — 70450 CT HEAD/BRAIN W/O DYE: CPT

## 2024-01-06 PROCEDURE — 700101 HCHG RX REV CODE 250: Performed by: EMERGENCY MEDICINE

## 2024-01-06 PROCEDURE — 700102 HCHG RX REV CODE 250 W/ 637 OVERRIDE(OP): Performed by: EMERGENCY MEDICINE

## 2024-01-06 PROCEDURE — 700111 HCHG RX REV CODE 636 W/ 250 OVERRIDE (IP): Mod: JZ | Performed by: INTERNAL MEDICINE

## 2024-01-06 PROCEDURE — 83605 ASSAY OF LACTIC ACID: CPT

## 2024-01-06 PROCEDURE — 87798 DETECT AGENT NOS DNA AMP: CPT

## 2024-01-06 PROCEDURE — 85025 COMPLETE CBC W/AUTO DIFF WBC: CPT

## 2024-01-06 PROCEDURE — A9270 NON-COVERED ITEM OR SERVICE: HCPCS | Performed by: EMERGENCY MEDICINE

## 2024-01-06 PROCEDURE — 82962 GLUCOSE BLOOD TEST: CPT

## 2024-01-06 PROCEDURE — 85610 PROTHROMBIN TIME: CPT

## 2024-01-06 PROCEDURE — 99233 SBSQ HOSP IP/OBS HIGH 50: CPT | Performed by: INTERNAL MEDICINE

## 2024-01-06 PROCEDURE — 87486 CHLMYD PNEUM DNA AMP PROBE: CPT

## 2024-01-06 PROCEDURE — 94003 VENT MGMT INPAT SUBQ DAY: CPT

## 2024-01-06 PROCEDURE — 87581 M.PNEUMON DNA AMP PROBE: CPT

## 2024-01-06 PROCEDURE — 94640 AIRWAY INHALATION TREATMENT: CPT

## 2024-01-06 PROCEDURE — 36600 WITHDRAWAL OF ARTERIAL BLOOD: CPT

## 2024-01-06 PROCEDURE — 99292 CRITICAL CARE ADDL 30 MIN: CPT | Performed by: EMERGENCY MEDICINE

## 2024-01-06 PROCEDURE — 700105 HCHG RX REV CODE 258: Performed by: EMERGENCY MEDICINE

## 2024-01-06 PROCEDURE — 80053 COMPREHEN METABOLIC PANEL: CPT

## 2024-01-06 PROCEDURE — 82803 BLOOD GASES ANY COMBINATION: CPT

## 2024-01-06 PROCEDURE — 94799 UNLISTED PULMONARY SVC/PX: CPT

## 2024-01-06 PROCEDURE — 770022 HCHG ROOM/CARE - ICU (200)

## 2024-01-06 PROCEDURE — 700111 HCHG RX REV CODE 636 W/ 250 OVERRIDE (IP): Mod: JZ | Performed by: EMERGENCY MEDICINE

## 2024-01-06 PROCEDURE — 83735 ASSAY OF MAGNESIUM: CPT

## 2024-01-06 PROCEDURE — 71045 X-RAY EXAM CHEST 1 VIEW: CPT

## 2024-01-06 PROCEDURE — 84100 ASSAY OF PHOSPHORUS: CPT

## 2024-01-06 RX ORDER — MAGNESIUM SULFATE HEPTAHYDRATE 40 MG/ML
2 INJECTION, SOLUTION INTRAVENOUS ONCE
Status: COMPLETED | OUTPATIENT
Start: 2024-01-06 | End: 2024-01-06

## 2024-01-06 RX ORDER — METHYLPREDNISOLONE SODIUM SUCCINATE 125 MG/2ML
125 INJECTION, POWDER, LYOPHILIZED, FOR SOLUTION INTRAMUSCULAR; INTRAVENOUS ONCE
Status: COMPLETED | OUTPATIENT
Start: 2024-01-06 | End: 2024-01-06

## 2024-01-06 RX ORDER — DEXMEDETOMIDINE HYDROCHLORIDE 4 UG/ML
.1-1.5 INJECTION, SOLUTION INTRAVENOUS CONTINUOUS
Status: DISCONTINUED | OUTPATIENT
Start: 2024-01-06 | End: 2024-01-11 | Stop reason: HOSPADM

## 2024-01-06 RX ORDER — METHYLPREDNISOLONE SODIUM SUCCINATE 125 MG/2ML
62.5 INJECTION, POWDER, LYOPHILIZED, FOR SOLUTION INTRAMUSCULAR; INTRAVENOUS DAILY
Status: COMPLETED | OUTPATIENT
Start: 2024-01-07 | End: 2024-01-09

## 2024-01-06 RX ADMIN — METHYLPREDNISOLONE SODIUM SUCCINATE 125 MG: 125 INJECTION, POWDER, FOR SOLUTION INTRAMUSCULAR; INTRAVENOUS at 15:28

## 2024-01-06 RX ADMIN — FENTANYL CITRATE 200 MCG: 50 INJECTION, SOLUTION INTRAMUSCULAR; INTRAVENOUS at 20:45

## 2024-01-06 RX ADMIN — IPRATROPIUM BROMIDE AND ALBUTEROL SULFATE 3 ML: 2.5; .5 SOLUTION RESPIRATORY (INHALATION) at 22:12

## 2024-01-06 RX ADMIN — HALOPERIDOL LACTATE 5 MG: 5 INJECTION, SOLUTION INTRAMUSCULAR at 23:45

## 2024-01-06 RX ADMIN — IPRATROPIUM BROMIDE AND ALBUTEROL SULFATE 3 ML: 2.5; .5 SOLUTION RESPIRATORY (INHALATION) at 09:54

## 2024-01-06 RX ADMIN — FAMOTIDINE 20 MG: 20 TABLET ORAL at 06:19

## 2024-01-06 RX ADMIN — IPRATROPIUM BROMIDE AND ALBUTEROL SULFATE 3 ML: 2.5; .5 SOLUTION RESPIRATORY (INHALATION) at 02:30

## 2024-01-06 RX ADMIN — IPRATROPIUM BROMIDE AND ALBUTEROL SULFATE 3 ML: 2.5; .5 SOLUTION RESPIRATORY (INHALATION) at 14:07

## 2024-01-06 RX ADMIN — Medication 200 MCG/HR: at 10:18

## 2024-01-06 RX ADMIN — AMPICILLIN SODIUM, SULBACTAM SODIUM 3 G: 2; 1 INJECTION, POWDER, FOR SOLUTION INTRAMUSCULAR; INTRAVENOUS at 00:01

## 2024-01-06 RX ADMIN — FENTANYL CITRATE 200 MCG: 50 INJECTION, SOLUTION INTRAMUSCULAR; INTRAVENOUS at 09:45

## 2024-01-06 RX ADMIN — AMPICILLIN SODIUM, SULBACTAM SODIUM 3 G: 2; 1 INJECTION, POWDER, FOR SOLUTION INTRAMUSCULAR; INTRAVENOUS at 06:39

## 2024-01-06 RX ADMIN — DOCUSATE SODIUM 50 MG AND SENNOSIDES 8.6 MG 2 TABLET: 8.6; 5 TABLET, FILM COATED ORAL at 17:16

## 2024-01-06 RX ADMIN — DEXMEDETOMIDINE 0.5 MCG/KG/HR: 100 INJECTION, SOLUTION INTRAVENOUS at 08:38

## 2024-01-06 RX ADMIN — FAMOTIDINE 20 MG: 10 INJECTION INTRAVENOUS at 17:15

## 2024-01-06 RX ADMIN — AMPICILLIN SODIUM, SULBACTAM SODIUM 3 G: 2; 1 INJECTION, POWDER, FOR SOLUTION INTRAMUSCULAR; INTRAVENOUS at 18:01

## 2024-01-06 RX ADMIN — MAGNESIUM SULFATE HEPTAHYDRATE 2 G: 2 INJECTION, SOLUTION INTRAVENOUS at 07:37

## 2024-01-06 RX ADMIN — IPRATROPIUM BROMIDE AND ALBUTEROL SULFATE 3 ML: 2.5; .5 SOLUTION RESPIRATORY (INHALATION) at 18:14

## 2024-01-06 RX ADMIN — IPRATROPIUM BROMIDE AND ALBUTEROL SULFATE 3 ML: 2.5; .5 SOLUTION RESPIRATORY (INHALATION) at 06:42

## 2024-01-06 RX ADMIN — PROPOFOL 50 MCG/KG/MIN: 10 INJECTION, EMULSION INTRAVENOUS at 10:56

## 2024-01-06 RX ADMIN — AMPICILLIN SODIUM, SULBACTAM SODIUM 3 G: 2; 1 INJECTION, POWDER, FOR SOLUTION INTRAMUSCULAR; INTRAVENOUS at 11:34

## 2024-01-06 RX ADMIN — FENTANYL CITRATE 200 MCG: 50 INJECTION, SOLUTION INTRAMUSCULAR; INTRAVENOUS at 17:15

## 2024-01-06 RX ADMIN — HALOPERIDOL LACTATE 5 MG: 5 INJECTION, SOLUTION INTRAMUSCULAR at 06:15

## 2024-01-06 RX ADMIN — PROPOFOL 80 MCG/KG/MIN: 10 INJECTION, EMULSION INTRAVENOUS at 19:35

## 2024-01-06 RX ADMIN — HALOPERIDOL LACTATE 5 MG: 5 INJECTION, SOLUTION INTRAMUSCULAR at 10:08

## 2024-01-06 RX ADMIN — PROPOFOL 70 MCG/KG/MIN: 10 INJECTION, EMULSION INTRAVENOUS at 15:23

## 2024-01-06 RX ADMIN — HALOPERIDOL LACTATE 5 MG: 5 INJECTION, SOLUTION INTRAMUSCULAR at 16:48

## 2024-01-06 RX ADMIN — PROPOFOL 50 MCG/KG/MIN: 10 INJECTION, EMULSION INTRAVENOUS at 04:38

## 2024-01-06 RX ADMIN — FENTANYL CITRATE 200 MCG: 50 INJECTION, SOLUTION INTRAMUSCULAR; INTRAVENOUS at 08:15

## 2024-01-06 RX ADMIN — Medication 300 MCG/HR: at 16:32

## 2024-01-06 RX ADMIN — FENTANYL CITRATE 200 MCG: 50 INJECTION, SOLUTION INTRAMUSCULAR; INTRAVENOUS at 04:52

## 2024-01-06 RX ADMIN — AMPICILLIN SODIUM, SULBACTAM SODIUM 3 G: 2; 1 INJECTION, POWDER, FOR SOLUTION INTRAMUSCULAR; INTRAVENOUS at 23:15

## 2024-01-06 RX ADMIN — ENOXAPARIN SODIUM 40 MG: 100 INJECTION SUBCUTANEOUS at 17:15

## 2024-01-06 RX ADMIN — PROPOFOL 80 MCG/KG/MIN: 10 INJECTION, EMULSION INTRAVENOUS at 23:06

## 2024-01-06 RX ADMIN — DOCUSATE SODIUM 50 MG AND SENNOSIDES 8.6 MG 2 TABLET: 8.6; 5 TABLET, FILM COATED ORAL at 06:19

## 2024-01-06 ASSESSMENT — PAIN DESCRIPTION - PAIN TYPE
TYPE: ACUTE PAIN

## 2024-01-06 ASSESSMENT — FIBROSIS 4 INDEX: FIB4 SCORE: 0.39

## 2024-01-06 NOTE — PROGRESS NOTES
Oncology/Hematology Progress Note               Author: Philip Hebert M.D. Date & Time created: 1/6/2024  10:23 AM     Interval History:  Patient had excisional lymph node biopsy from left axilla, remains on ventilator this morning    Review of Systems:  Review of Systems   Unable to perform ROS: Intubated       Physical Exam:  Physical Exam  Vitals and nursing note reviewed.   HENT:      Head: Normocephalic and atraumatic.      Right Ear: External ear normal.      Left Ear: External ear normal.      Nose: Nose normal.      Mouth/Throat:      Mouth: Mucous membranes are moist.      Pharynx: Oropharynx is clear.   Eyes:      Extraocular Movements: Extraocular movements intact.      Conjunctiva/sclera: Conjunctivae normal.      Pupils: Pupils are equal, round, and reactive to light.   Cardiovascular:      Rate and Rhythm: Normal rate and regular rhythm.      Pulses: Normal pulses.      Heart sounds: Normal heart sounds.   Pulmonary:      Effort: Pulmonary effort is normal.      Breath sounds: Normal breath sounds.   Abdominal:      General: Abdomen is flat. Bowel sounds are normal.      Palpations: Abdomen is soft.   Musculoskeletal:         General: Normal range of motion.      Cervical back: Normal range of motion.   Lymphadenopathy:      Cervical: Cervical adenopathy present.   Skin:     General: Skin is warm and dry.   Neurological:      Mental Status: She is disoriented.      Coordination: Coordination abnormal.         Labs:  Recent Labs     01/05/24  0143 01/05/24  0356 01/06/24  0407   ISTATAPH 7.307* 7.376* 7.374*   ISTATAPCO2 56.8* 46.4* 46.6*   ISTATAPO2 72 74 82   ISTATATCO2 30 29 29   [id number] 92* 94 96   ISTATARTHCO3 28.4* 27.2* 27.2*   ISTATARTBE 1 2 2   ISTATTEMP 98.6 F 98.1 F 97.7 F   ISTATFIO2 40 40 50   ISTATSPEC Arterial Arterial Arterial   ISTATAPHTC 7.307* 7.380* 7.381*   [id number] 72 73 80           Recent Labs     01/04/24  0713 01/05/24  0202 01/06/24  0420   SODIUM 135 137 142    POTASSIUM 4.7 4.7 4.2   CHLORIDE 99 102 106   CO2 22 26 26   BUN 10 11 7*   CREATININE 0.79 0.66 0.59   MAGNESIUM  --  2.3 1.7   PHOSPHORUS  --  3.5 2.8   CALCIUM 8.7 8.6 8.1*       Recent Labs     24  0724  0202 24  0420   ALTSGPT 18 18 12   ASTSGOT 22 19 22   ALKPHOSPHAT 94 85 77   TBILIRUBIN <0.2 0.2 <0.2   DBILIRUBIN <0.2  --   --    GLUCOSE 234* 100* 96       Recent Labs     24  0724  02024  0355 24  042   RBC 4.03* 3.53*  --  3.35*   HEMOGLOBIN 11.9* 10.7*  --  10.0*   HEMATOCRIT 35.9* 32.8*  --  32.1*   PLATELETCT 418 363  --  363   PROTHROMBTM 15.2*  --  15.2* 15.0*   APTT 31.0  --   --   --    INR 1.18*  --  1.18* 1.17*       Recent Labs     24  07242 24  1000 24  0420   WBC 22.5* 22.1*  --  11.0*   NEUTSPOLYS 93.90* 89.10*  --  77.60*   LYMPHOCYTES 4.40* 3.70*  --  9.30*   MONOCYTES 1.70 6.00  --  8.20   EOSINOPHILS 0.00 0.30 2 4.30   BASOPHILS 0.00 0.40  --  0.40   ASTSGOT 22 19  --  22   ALTSGPT 18 18  --  12   ALKPHOSPHAT 94 85  --  77   TBILIRUBIN <0.2 0.2  --  <0.2       Recent Labs     24  02024  042   SODIUM 135 137 142   POTASSIUM 4.7 4.7 4.2   CHLORIDE 99 102 106   CO2 22 26 26   GLUCOSE 234* 100* 96   BUN 10 11 7*   CREATININE 0.79 0.66 0.59   CALCIUM 8.7 8.6 8.1*       Hemodynamics:  Temp (24hrs), Av.6 °C (99.7 °F), Min:37.6 °C (99.6 °F), Max:37.6 °C (99.7 °F)  Temperature: 37.6 °C (99.7 °F), Monitored Temp: 37.9 °C (100.2 °F)  Pulse  Av  Min: 67  Max: 113   Blood Pressure: 92/46     Respiratory:  Vent Mode: APVCMV, Rate (breaths/min): 28, PEEP/CPAP: 8, PIP: 28, MAP: 14 Respiration: (!) 28, Pulse Oximetry: 96 %     Work Of Breathing / Effort: Vented  RUL Breath Sounds: Inspiratory Wheezes, RML Breath Sounds: Inspiratory Wheezes, RLL Breath Sounds: Inspiratory Wheezes, IVAN Breath Sounds: Inspiratory Wheezes, LLL Breath Sounds: Inspiratory Wheezes  Fluids:    Intake/Output  Summary (Last 24 hours) at 1/5/2024 1308  Last data filed at 1/5/2024 1151  Gross per 24 hour   Intake 975.92 ml   Output 1670 ml   Net -694.08 ml     Weight: 62.7 kg (138 lb 3.7 oz)  GI/Nutrition:  Orders Placed This Encounter   Procedures    Diet NPO Restrict to: Strict (okay to receive meds through the NG/OG tube)     Standing Status:   Standing     Number of Occurrences:   1     Order Specific Question:   Type:     Answer:   Now [1]     Order Specific Question:   Diet NPO Restrict to:     Answer:   Strict [1]     Comments:   okay to receive meds through the NG/OG tube     Medical Decision Making, by Problem:  Active Hospital Problems    Diagnosis     *Acute respiratory failure with hypoxia (HCC) [J96.01]     Lymphadenopathy [R59.1]     Pulmonary emboli (HCC) [I26.99]     Asthma [J45.909]        Plan:  Lymphoma - awaiting pathology from excisional lymph node biopsy. May need to consider initial cycle of chemotherapy or single agent Rituxan to debulk disease. Eventually would benefit from PET/CT and bone marrow biopsy to complete staging. LP may also be indicated depending on pathology.  Respiratory failure - ventilator management per critical care      Case discussed with parents at bedside     Quality-Core Measures   Poole catheter::  Critically Ill - Requiring Accurate Measurement of Urinary Output  DVT prophylaxis pharmacological::  Enoxaparin (Lovenox)  DVT prophylaxis - mechanical:  SCDs  Assessed for rehabilitation services:  Patient unable to tolerate rehabilitation therapeutic regimen    Philip Hebert M.D.

## 2024-01-06 NOTE — CARE PLAN
The patient is Watcher - Medium risk of patient condition declining or worsening    Shift Goals  Clinical Goals: patient safety, airway tolereance.  Patient Goals: YOLY  Family Goals: Have questions answered; Stay with patient      Problem: Pain - Standard  Goal: Alleviation of pain or a reduction in pain to the patient’s comfort goal  Description: Target End Date:  Prior to discharge or change in level of care    Document on Vitals flowsheet    1.  Document pain using the appropriate pain scale per order or unit policy  2.  Educate and implement non-pharmacologic comfort measures (i.e. relaxation, distraction, massage, cold/heat therapy, etc.)  3.  Pain management medications as ordered  4.  Reassess pain after pain med administration per policy  5.  If opiods administered assess patient's response to pain medication is appropriate per POSS sedation scale  6.  Follow pain management plan developed in collaboration with patient and interdisciplinary team (including palliative care or pain specialists if applicable)  Outcome: Progressing     Problem: Skin Integrity  Goal: Skin integrity is maintained or improved  Description: Target End Date:  Prior to discharge or change in level of care    Document interventions on Skin Risk/Germ[place] flowsheet groups and corresponding LDA    1.  Assess and monitor skin integrity, appearance and/or temperature  2.  Assess risk factors for impaired skin integrity and/or pressures ulcers  3.  Implement precautions to protect skin integrity in collaboration with interdisciplinary team  4.  Implement pressure ulcer prevention protocol if at risk for skin breakdown  5.  Confirm wound care consult if at risk for skin breakdown  6.  Ensure patient use of pressure relieving devices  (Low air loss bed, waffle overlay, heel protectors, ROHO cushion, etc)  Outcome: Progressing     Problem: Fall Risk  Goal: Patient will remain free from falls  Description: Target End Date:  Prior to discharge or  change in level of care    Document interventions on the [facility] Fall Risk Assessment    1.  Assess for fall risk factors  2.  Implement fall precautions  Outcome: Progressing     Problem: Safety - Medical Restraint  Goal: Remains free of injury from restraints (Restraint for Interference with Medical Device)  Description: INTERVENTIONS:  1. Determine that other, less restrictive measures have been tried or would not be effective before applying the restraint  2. Evaluate the patient's condition at the time of restraint application  3. Educate patient/family regarding the reason for restraint  4. Q2H: Monitor safety, psychosocial status, comfort, circulation, respiratory status, LOC, nutrition and hydration  Outcome: Progressing  Goal: Free from restraint(s) (Restraint for Interference with Medical Device)  Description: INTERVENTIONS:  1.  ONCE/SHIFT or MINIMUM Q12H: Assess and document the continuing need for restraints  2.  Q24H: Continued use of restraint requires LIP to perform face to face examination and written order  3.  Identify and implement measures to help patient regain control  4.  Educate patient/family on discontinuation criteria   5.  Assess patient's understanding and retention of education provided  6.  Assess readiness for release & initiate progressive release per protocol  7.  Identify and document criteria for restraints  Outcome: Progressing         Problem: Knowledge Deficit - Standard  Goal: Patient and family/care givers will demonstrate understanding of plan of care, disease process/condition, diagnostic tests and medications  Description: Target End Date:  1-3 days or as soon as patient condition allows    Document in Patient Education    1.  Patient and family/caregiver oriented to unit, equipment, visitation policy and means for communicating concern  2.  Complete/review Learning Assessment  3.  Assess knowledge level of disease process/condition, treatment plan, diagnostic tests  and medications  4.  Explain disease process/condition, treatment plan, diagnostic tests and medications  Outcome: Not Progressing

## 2024-01-06 NOTE — CARE PLAN
The patient is Watcher - Medium risk of patient condition declining or worsening    Shift Goals  Clinical Goals: Patient safety; Reduce agitation; Hemodynamic stability  Patient Goals: YOLY  Family Goals: Have questions answered; Stay with patient    Progress made toward(s) clinical / shift goals:    Problem: Knowledge Deficit - Standard  Goal: Patient and family/care givers will demonstrate understanding of plan of care, disease process/condition, diagnostic tests and medications  Outcome: Progressing     Problem: Pain - Standard  Goal: Alleviation of pain or a reduction in pain to the patient’s comfort goal  Outcome: Progressing     Problem: Skin Integrity  Goal: Skin integrity is maintained or improved  Outcome: Progressing     Problem: Fall Risk  Goal: Patient will remain free from falls  Outcome: Progressing     Problem: Safety - Medical Restraint  Goal: Remains free of injury from restraints (Restraint for Interference with Medical Device)  Outcome: Progressing  Goal: Free from restraint(s) (Restraint for Interference with Medical Device)  Outcome: Progressing       Patient is not progressing towards the following goals: None

## 2024-01-06 NOTE — CARE PLAN
Problem: Bronchoconstriction  Goal: Improve in air movement and diminished wheezing  Description: Target End Date:  2 to 3 days    1.  Implement inhaled treatments  2.  Evaluate and manage medication effects  Outcome: Progressing   Duo Q4      Problem: Ventilation  Goal: Ability to achieve and maintain unassisted ventilation or tolerate decreased levels of ventilator support  Description: Target End Date:  4 days     Document on Vent flowsheet    1.  Support and monitor invasive and noninvasive mechanical ventilation  2.  Monitor ventilator weaning response  3.  Perform ventilator associated pneumonia prevention interventions  4.  Manage ventilation therapy by monitoring diagnostic test results  Outcome: Progressing     Ventilator Daily Summary    Vent Day #3    Airway: 7.5/25     Ventilator settings: 28/330/8/50    Weaning trials:     Respiratory Procedures:     Plan: Continue current ventilator settings and wean mechanical ventilation as tolerated per physician orders.

## 2024-01-06 NOTE — PROGRESS NOTES
"Critical Care Progress Note    Date of admission  1/4/2024    Chief Complaint  \"33 y.o. female who presented 1/4/2024 with a past medical history of anxiety, asthma, and a history of progressive lymphadenopathy over the last several months to the ED yesterday with acute dyspnea which she described as to the emergency physician as being fairly sudden onset, the patient has had multiple ED visits for respiratory illnesses and exacerbations related to inhalations and exposures and has had increasing episodes over the last month or 2.  In the ED she was reported as having bilateral wheezing tachypnea and accessory muscle use and tripoding, and was treated with bronchodilators, steroids and magnesium and low-dose ketamine.  She was then given some Versed. She was over the course of her ED stay she continued to worsen and have recurrent wheezing and was given epinephrine antihistamines as well as further anxiolytics.  She was also treated with antibiotics for possible pneumonia.  Ultimately she developed some hypoxemia and worsening respiratory distress and being unable to sit upright in and maintain her own airway and with reported intermittent stridor she was intubated.     He had a CT scan of her chest and soft tissue of her neck which was concerning for paratracheal mass, lymphadenopathy in her cervical and mediastinal and axillary region, and was transferred to [facility] [place] [facility] for further workup.     At arrival here she is intubated sedated on mechanical ventilation.\"    Hospital Course  1/4-Admit ICU, IMV, new mediastinal mass and LAD  1/5-BAL done, to OR with ACGS for node biopsy, difficult to sedate on prop and fent, added LDK, BAL + empiric abx for dense secretions and possible PNA  1/6-VD 3, failed SAT for agitation and desaturations, very difficult to sedate    Interval Problem Update  Reviewed last 24 hour events:    Severe agitation overnight  Multiple fentanyl pushes, Haldol, Ketamine increased    Repeat CT PA with no " PE, BLE US no DVT  CT with diffuse GGOs, possible pneumonia/pneumonitis    Neuro:   LDK 0.4 mcg/kg/min  Prop 50  Fent @ 250    CV:  HR 80s-105  SBP     Resp:   APV-  28/330/8/0.5  SP02 95%  7.4/46/82/96%  CXR stable      I/O: +1L  UOP: 1525    Tmax: AF  Heme: WBC 11    Abx:   Unasyn    Micro:  1/5- BAL NGTD  BCxs pending  PCT 0.79    Endo: BG low range normal    LDA: PIVs  SUP: H2RA  VTE: Enox  Diet: TF      Review of Systems  Review of Systems   Unable to perform ROS: Critical illness        Vital Signs for last 24 hours   Temp:  [37.6 °C (99.6 °F)-37.6 °C (99.7 °F)] 37.6 °C (99.7 °F)  Pulse:  [] 82  Resp:  [18-36] 28  BP: ()/(39-69) 89/46  SpO2:  [91 %-99 %] 96 %    Hemodynamic parameters for last 24 hours       Respiratory Information for the last 24 hours  Vent Mode: APVCMV  Rate (breaths/min): 28  Vt Target (mL): 330  PEEP/CPAP: 8  MAP: 13  Control VTE (exp VT): 330    Physical Exam   Physical Exam  Constitutional:       General: She is not in acute distress.     Appearance: She is ill-appearing.   HENT:      Head: Normocephalic.      Mouth/Throat:      Mouth: Mucous membranes are moist.   Eyes:      Extraocular Movements: Extraocular movements intact.      Pupils: Pupils are equal, round, and reactive to light.   Cardiovascular:      Rate and Rhythm: Regular rhythm. Tachycardia present.      Heart sounds: No murmur heard.  Pulmonary:      Effort: Respiratory distress present.      Breath sounds: No wheezing.      Comments: He does have a single wheezing in the center of her chest which is associated with Ventilator cycle, she does not have diffuse wheezing in her distal airways, she has good airflow no obstructive physiology on the ventilator  Musculoskeletal:      Right lower leg: No edema.      Left lower leg: No edema.   Lymphadenopathy:      Cervical: Cervical adenopathy present.   Skin:     General: Skin is warm.      Capillary Refill: Capillary refill takes less than 2 seconds.    Neurological:      Comments: Very agitated, no follow, trying to sit up and crawl out of bed when I wake her  She is RADAMES DAS           Medications  Current Facility-Administered Medications   Medication Dose Route Frequency Provider Last Rate Last Admin    dexmedetomidine (PRECEDEX) 400 mcg/100mL NS premix infusion  0.1-1.5 mcg/kg/hr (Ideal) Intravenous Continuous Murray Morris M.D. 13.7 mL/hr at 01/06/24 1100 1 mcg/kg/hr at 01/06/24 1100    methylPREDNISolone sod succ (SOLU-MEDROL) 125 MG injection 125 mg  125 mg Intravenous Once Murray Morris M.D.        [START ON 1/7/2024] methylPREDNISolone sod succ (SOLU-MEDROL) 125 MG injection 62.5 mg  62.5 mg Intravenous DAILY Murray Morris M.D.        ketamine (Ketalar) 500 mg in 250 mL NS infusion (continuous for pain)  0.2 mg/kg/hr Intravenous Continuous Murray Morris M.D.   Stopped at 01/06/24 0754    haloperidol lactate (Haldol) injection 5 mg  5 mg Intravenous Q4HRS PRN Murray Morris M.D.   5 mg at 01/06/24 1008    norepinephrine (Levophed) 8 mg in 250 mL NS infusion (premix)  0-1 mcg/kg/min (Ideal) Intravenous Continuous Murray Morris M.D.   Dose not Required at 01/05/24 1000    enoxaparin (Lovenox) inj 40 mg  40 mg Subcutaneous DAILY AT 1800 Philip Hebert M.D.   40 mg at 01/05/24 1740    fentaNYL (Sublimaze) injection 100 mcg  100 mcg Intravenous Q15 MIN PRN Murray Morris M.D.   100 mcg at 01/05/24 2035    And    fentaNYL (Sublimaze) injection 200 mcg  200 mcg Intravenous Q15 MIN PRN Murray Morris M.D.   200 mcg at 01/06/24 0945    And    fentaNYL (SUBLIMAZE) 50 mcg/mL in 50mL (Continuous Infusion)   Intravenous Continuous Murray Morris M.D. 6 mL/hr at 01/06/24 1127 300 mcg/hr at 01/06/24 1127    And    propofol (DIPRIVAN) injection  0-80 mcg/kg/min (Ideal) Intravenous Continuous Murray Morris M.D. 23 mL/hr at 01/06/24 1259 70 mcg/kg/min at 01/06/24 1259    ampicillin/sulbactam (Unasyn) 3 g in  mL IVPB   3 g Intravenous Q6HRS Murray Morris M.D.   Stopped at 01/06/24 1204    senna-docusate (Pericolace Or Senokot S) 8.6-50 MG per tablet 2 Tablet  2 Tablet Oral BID Murray Morris M.D.   2 Tablet at 01/06/24 0619    And    polyethylene glycol/lytes (Miralax) Packet 1 Packet  1 Packet Oral QDAY PRN Murray Morris M.D.        And    magnesium hydroxide (Milk Of Magnesia) suspension 30 mL  30 mL Oral QDAY PRN Murray Morris M.D.        And    bisacodyl (Dulcolax) suppository 10 mg  10 mg Rectal QDAY PRN Murray Morris M.D.        insulin regular (HumuLIN R,NovoLIN R) injection  2-9 Units Subcutaneous Q6HRS Murray Morris M.D.   3 Units at 01/04/24 0733    And    dextrose 10 % BOLUS 25 g  25 g Intravenous Q15 MIN PRN Murray Morris M.D. 999 mL/hr at 01/05/24 1808 25 g at 01/05/24 1808    Respiratory Therapy Consult   Nebulization Continuous RT Murray Morris M.D.        famotidine (Pepcid) tablet 20 mg  20 mg Enteral Tube Q12HRS Murray Morris M.D.   20 mg at 01/06/24 0619    Or    famotidine (Pepcid) injection 20 mg  20 mg Intravenous Q12HRS Murray Morris M.D.   20 mg at 01/05/24 1740    MD Alert...ICU Electrolyte Replacement per Pharmacy   Other PHARMACY TO DOSE Murray Morris M.D.        lidocaine (Xylocaine) 1 % injection 2 mL  2 mL Tracheal Tube Q30 MIN PRN Murray Morris M.D.   5 mL at 01/05/24 0937    ipratropium-albuterol (DUONEB) nebulizer solution  3 mL Nebulization Q4HRS (RT) Murray Morris M.D.   3 mL at 01/06/24 1407       Fluids    Intake/Output Summary (Last 24 hours) at 1/6/2024 1442  Last data filed at 1/6/2024 1302  Gross per 24 hour   Intake 1996.15 ml   Output 935 ml   Net 1061.15 ml       Laboratory  Recent Labs     01/05/24  0143 01/05/24  0356 01/06/24  0407   ISTATAPH 7.307* 7.376* 7.374*   ISTATAPCO2 56.8* 46.4* 46.6*   ISTATAPO2 72 74 82   ISTATATCO2 30 29 29   [id number] 92* 94 96   ISTATARTHCO3 28.4* 27.2* 27.2*   ISTATARTBE 1 2 2   ISTATTEMP  98.6 F 98.1 F 97.7 F   ISTATFIO2 40 40 50   ISTATSPEC Arterial Arterial Arterial   ISTATAPHTC 7.307* 7.380* 7.381*   [id number] 72 73 80         Recent Labs     01/04/24  0713 01/05/24  0202 01/06/24  0420   SODIUM 135 137 142   POTASSIUM 4.7 4.7 4.2   CHLORIDE 99 102 106   CO2 22 26 26   BUN 10 11 7*   CREATININE 0.79 0.66 0.59   MAGNESIUM  --  2.3 1.7   PHOSPHORUS  --  3.5 2.8   CALCIUM 8.7 8.6 8.1*     Recent Labs     01/04/24  0713 01/05/24  0202 01/06/24  0420   ALTSGPT 18 18 12   ASTSGOT 22 19 22   ALKPHOSPHAT 94 85 77   TBILIRUBIN <0.2 0.2 <0.2   DBILIRUBIN <0.2  --   --    GLUCOSE 234* 100* 96     Recent Labs     01/04/24  0713 01/05/24 0202 01/05/24  1000 01/06/24  0420   WBC 22.5* 22.1*  --  11.0*   NEUTSPOLYS 93.90* 89.10*  --  77.60*   LYMPHOCYTES 4.40* 3.70*  --  9.30*   MONOCYTES 1.70 6.00  --  8.20   EOSINOPHILS 0.00 0.30 2 4.30   BASOPHILS 0.00 0.40  --  0.40   ASTSGOT 22 19  --  22   ALTSGPT 18 18  --  12   ALKPHOSPHAT 94 85  --  77   TBILIRUBIN <0.2 0.2  --  <0.2     Recent Labs     01/04/24 0713 01/05/24 0202 01/05/24  0355 01/06/24  0420   RBC 4.03* 3.53*  --  3.35*   HEMOGLOBIN 11.9* 10.7*  --  10.0*   HEMATOCRIT 35.9* 32.8*  --  32.1*   PLATELETCT 418 363  --  363   PROTHROMBTM 15.2*  --  15.2* 15.0*   APTT 31.0  --   --   --    INR 1.18*  --  1.18* 1.17*       Imaging  X-Ray:  I have personally reviewed the images and compared with prior images.    Assessment/Plan  * Acute respiratory failure with hypoxia (HCC)- (present on admission)  Assessment & Plan  Intubated 1/4 at outside facility for refractory respiratory distress, stridor and wheezing    Not clear what the inciting event was, given secretions, leukocytosis and bronchoscopy findings, will give empiric unasyn and follow cultures  PCT 0.79 equivocal  Deescalate as able    1/6- Continues to fail SAT for severe agitation and desaturations, patient a danger to herself and nearly self-extubated  Very difficult to keep her sedated  Will  attempt cross titration to dex today    Uncertain whether she will be able to breath spontaneously with extubation given the fact she was reported as having stridor and respiratory distress possibly on the basis of extrinsic airway compression in outside hospital. Will continue to work on sedation lightening to assess spontaneous breathing prior to extubation    RT/O2 protocols  Daily and PRN VBG/ABGs  Titration of ventilator to optimize lung protection, gas exchange and acid-base status   Sedation as tolerated/indicated  Daily CXR  HOB >30 degrees and chlorhexidine for VAP prevention  Pepcid for GI prophylaxis  SAT/SBT when able (ABCDEF Bundle)  Early mobility        Lymphadenopathy- (present on admission)  Assessment & Plan  1/4 Chest CT with peritracheal, cervical and axillary LAD, with multi-station mediastinal LAD  Peritracheal mass represents a LN, likely lymphproliferative disorder  1/5- L axillary LN biopsy, awaiting path    No plan for chemo induction or XRT until path results    Onc consult    TLS labs        Asthma- (present on admission)  Assessment & Plan  Reported wheezing at outside hospital  S/P nebs and steroids    No significant bronchspasm here and no obstructive physiology on the vent  Duonebs for now    1/6- Will start steroids today in the event she has some RAD component and for potential reduction in LAD   followed by 60mg qd x 3 days           VTE:  Lovenox  Ulcer: H2 Antagonist  Lines: Poole Catheter  Ongoing indication addressed    I have performed a physical exam and reviewed and updated ROS and Plan today (1/6/2024). In review of yesterday's note (1/5/2024), there are no changes except as documented above.     Discussed patient condition and risk of morbidity and/or mortality with Family, RN, RT, Therapies, Pharmacy, and oncology  The patient remains critically ill.  Critical care time = 85 minutes in directly providing and coordinating critical care and extensive data review.  No  time overlap and excludes procedures.

## 2024-01-06 NOTE — PROGRESS NOTES
APRN Nicole updated on patient's increased agitation, hypotension, and decreasing urine output. New orders received and implemented.

## 2024-01-06 NOTE — PROCEDURES
Procedure Note    Date: 1/5/2024      Procedure: Bronchoscopy with bronchoalveolar lavage and therapeutic aspiration of secretions from bronchi      Anesthesia: General, administered by myself, please refer to notes for agents and dosing    Indication: New respiratory failure, stridor, RML infiltrate,     Consent: Informed consent obtained from patient or designated decision maker after explaining the benefits/risks of the procedure including but not limited to bleeding, infection, airway trauma or loss therof, pneumothorax/hemothorax, arrythmia, or death. Patient or surrogate expressed understanding and agreement.    Time-out: Verbal consent was obtained. Immediately prior to procedure, a time out was called to verify the correct patient, procedure, equipment, support staff and site/side marked as required.     Procedure: After obtaining consent, a time-out was performed. Respiratory therapy and nursing at bedside throughout procedure. Patient provided sedation and analgesia throughout the procedure. Placed on full ventilator support with an FiO2 of 100% throughout the procedure. Using a fiberoptic bronchoscope, trachea entered via ETT.      10 mL of local anesthetic sprayed at the agustin (2% lidocaine) achieving appropriate comfort level for patient.     Airways visualized directly and the following intervention was performed: diagnostic and therapeutic lavage with all areas of lungs suctioned to clear. Findings as below. Patient tolerated procedure well without any difficulties and left in care of bedside nurse/RT.       Findings: Upper airway - Not visualized as bronchoscope passed through ETT.            Trachea to agustin - normal appearing mucosa without lesions or mass, ETT tip measured 4cm initially then advanced to 2 cm from the agustin.          Right proximal and distal airways - normal appearing mucosa without mass/lesion/anatomic variance, secretions: thick, whitish secretions in multiple distal  airway, difficult to clear, multiple washes to all segments          Left proximal and distal airways - normal appearing mucosa without mass/lesion/anatomic variance, secretions: some thick white secretions, less so román right          Samples - BAL to RML of bronchi    Complications: none      Murray Morris MD  Critical Care Medicine

## 2024-01-06 NOTE — PROGRESS NOTES
"  DATE: 1/5/2024      INTERVAL EVENTS:  Condition unchanged.  Axilla incision intact, no appreciable hematoma    PHYSICAL EXAMINATION:  Vital Signs: BP 97/54   Pulse 83   Resp (!) 28   Ht 1.626 m (5' 4.02\")   Wt 59.3 kg (130 lb 11.7 oz)   SpO2 100%   Left axilla wound intact  No induration or hematoma    LABORATORY VALUES:  Recent Labs     01/03/24 0030 01/04/24 0713 01/05/24  0202   WBC 24.7* 22.5* 22.1*   RBC 4.14* 4.03* 3.53*   HEMOGLOBIN 12.4 11.9* 10.7*   HEMATOCRIT 36.4* 35.9* 32.8*   MCV 87.9 89.1 92.9   MCH 30.0 29.5 30.3   MCHC 34.1 33.1 32.6   RDW 44.6 47.0 51.8*   PLATELETCT 430 418 363   MPV 8.5* 8.5* 8.6*     Recent Labs     01/03/24 2053 01/04/24 0713 01/05/24  0202   SODIUM 134* 135 137   POTASSIUM 3.9 4.7 4.7   CHLORIDE 96 99 102   CO2 23 22 26   GLUCOSE 122* 234* 100*   BUN 12 10 11   CREATININE 0.82 0.79 0.66   CALCIUM 9.4 8.7 8.6     Recent Labs     01/03/24 2053 01/04/24 0713 01/05/24 0202 01/05/24  0355   ASTSGOT 26 22 19  --    ALTSGPT 19 18 18  --    TBILIRUBIN 0.2 <0.2 0.2  --    IBILIRUBIN  --  see below  --   --    DBILIRUBIN  --  <0.2  --   --    ALKPHOSPHAT 105* 94 85  --    GLOBULIN 4.0*  --  3.5  --    INR  --  1.18*  --  1.18*     Recent Labs     01/04/24 0713 01/05/24 0355   APTT 31.0  --    INR 1.18* 1.18*        IMAGING:  CT-CTA CHEST PULMONARY ARTERY W/ RECONS   Final Result         1.  No definite evidence of pulmonary embolism.   2.  Mild mixed change in presumed multifocal pneumonia.   3.  Extensive lymphadenopathy likely lymphoproliferative disorder such as lymphoma..      Fleischner Society pulmonary nodule recommendations:         EC-ECHOCARDIOGRAM COMPLETE W/O CONT   Final Result      US-EXTREMITY VENOUS LOWER BILAT   Final Result      DX-CHEST-PORTABLE (1 VIEW)   Final Result         1. No significant interval change.      DX-CHEST-PORTABLE (1 VIEW)   Final Result      1.  Interstitial prominence, new from prior exam.   2.  Multifocal mediastinal prominence as " evaluated on CT chest.   3.  Support apparatus as above.          ASSESSMENT AND PLAN:  Lymphadenopathy    -s/p left axillary lymph node excisional biopsy  - Wound intact, no concerns  - All sutures absorbable, Dermabond will fall away in 7-14 days  - No specific wound care instructions  - No need to follow up with general surgery  - KRISTA álvarez will sign off at this time       ____________________________________     Philip Hernandez M.D.    DD: 1/5/2024  4:15 PM

## 2024-01-07 ENCOUNTER — APPOINTMENT (OUTPATIENT)
Dept: RADIOLOGY | Facility: MEDICAL CENTER | Age: 34
DRG: 823 | End: 2024-01-07
Attending: EMERGENCY MEDICINE
Payer: MEDICAID

## 2024-01-07 LAB
ALBUMIN SERPL BCP-MCNC: 2.7 G/DL (ref 3.2–4.9)
ALBUMIN/GLOB SERPL: 0.8 G/DL
ALP SERPL-CCNC: 82 U/L (ref 30–99)
ALT SERPL-CCNC: 20 U/L (ref 2–50)
ANION GAP SERPL CALC-SCNC: 10 MMOL/L (ref 7–16)
AST SERPL-CCNC: 36 U/L (ref 12–45)
BACTERIA BRONCH AEROBE CULT: NORMAL
BASOPHILS # BLD AUTO: 0.2 % (ref 0–1.8)
BASOPHILS # BLD: 0.02 K/UL (ref 0–0.12)
BILIRUB SERPL-MCNC: <0.2 MG/DL (ref 0.1–1.5)
BUN SERPL-MCNC: 9 MG/DL (ref 8–22)
CALCIUM ALBUM COR SERPL-MCNC: 9.2 MG/DL (ref 8.5–10.5)
CALCIUM SERPL-MCNC: 8.2 MG/DL (ref 8.5–10.5)
CHLORIDE SERPL-SCNC: 107 MMOL/L (ref 96–112)
CO2 SERPL-SCNC: 25 MMOL/L (ref 20–33)
CREAT SERPL-MCNC: 0.46 MG/DL (ref 0.5–1.4)
EOSINOPHIL # BLD AUTO: 0 K/UL (ref 0–0.51)
EOSINOPHIL NFR BLD: 0 % (ref 0–6.9)
ERYTHROCYTE [DISTWIDTH] IN BLOOD BY AUTOMATED COUNT: 50.4 FL (ref 35.9–50)
GFR SERPLBLD CREATININE-BSD FMLA CKD-EPI: 130 ML/MIN/1.73 M 2
GLOBULIN SER CALC-MCNC: 3.4 G/DL (ref 1.9–3.5)
GLUCOSE BLD STRIP.AUTO-MCNC: 112 MG/DL (ref 65–99)
GLUCOSE BLD STRIP.AUTO-MCNC: 120 MG/DL (ref 65–99)
GLUCOSE BLD STRIP.AUTO-MCNC: 122 MG/DL (ref 65–99)
GLUCOSE SERPL-MCNC: 124 MG/DL (ref 65–99)
GRAM STN SPEC: NORMAL
HCT VFR BLD AUTO: 32.6 % (ref 37–47)
HGB BLD-MCNC: 10.3 G/DL (ref 12–16)
IMM GRANULOCYTES # BLD AUTO: 0.06 K/UL (ref 0–0.11)
IMM GRANULOCYTES NFR BLD AUTO: 0.5 % (ref 0–0.9)
INR PPP: 1.15 (ref 0.87–1.13)
LYMPHOCYTES # BLD AUTO: 0.6 K/UL (ref 1–4.8)
LYMPHOCYTES NFR BLD: 4.8 % (ref 22–41)
MAGNESIUM SERPL-MCNC: 2.2 MG/DL (ref 1.5–2.5)
MCH RBC QN AUTO: 29.7 PG (ref 27–33)
MCHC RBC AUTO-ENTMCNC: 31.6 G/DL (ref 32.2–35.5)
MCV RBC AUTO: 93.9 FL (ref 81.4–97.8)
MONOCYTES # BLD AUTO: 0.61 K/UL (ref 0–0.85)
MONOCYTES NFR BLD AUTO: 4.8 % (ref 0–13.4)
NEUTROPHILS # BLD AUTO: 11.33 K/UL (ref 1.82–7.42)
NEUTROPHILS NFR BLD: 89.7 % (ref 44–72)
NRBC # BLD AUTO: 0 K/UL
NRBC BLD-RTO: 0 /100 WBC (ref 0–0.2)
PHOSPHATE SERPL-MCNC: 4.1 MG/DL (ref 2.5–4.5)
PLATELET # BLD AUTO: 391 K/UL (ref 164–446)
PMV BLD AUTO: 8.5 FL (ref 9–12.9)
POTASSIUM SERPL-SCNC: 4.3 MMOL/L (ref 3.6–5.5)
PROT SERPL-MCNC: 6.1 G/DL (ref 6–8.2)
PROTHROMBIN TIME: 14.9 SEC (ref 12–14.6)
RBC # BLD AUTO: 3.47 M/UL (ref 4.2–5.4)
SIGNIFICANT IND 70042: NORMAL
SITE SITE: NORMAL
SODIUM SERPL-SCNC: 142 MMOL/L (ref 135–145)
SOURCE SOURCE: NORMAL
TRIGL SERPL-MCNC: 82 MG/DL (ref 0–149)
URATE SERPL-MCNC: 2.1 MG/DL (ref 1.9–8.2)
WBC # BLD AUTO: 12.6 K/UL (ref 4.8–10.8)

## 2024-01-07 PROCEDURE — 84550 ASSAY OF BLOOD/URIC ACID: CPT

## 2024-01-07 PROCEDURE — 80053 COMPREHEN METABOLIC PANEL: CPT

## 2024-01-07 PROCEDURE — 700105 HCHG RX REV CODE 258: Performed by: EMERGENCY MEDICINE

## 2024-01-07 PROCEDURE — 94799 UNLISTED PULMONARY SVC/PX: CPT

## 2024-01-07 PROCEDURE — 85025 COMPLETE CBC W/AUTO DIFF WBC: CPT

## 2024-01-07 PROCEDURE — 94003 VENT MGMT INPAT SUBQ DAY: CPT

## 2024-01-07 PROCEDURE — 700111 HCHG RX REV CODE 636 W/ 250 OVERRIDE (IP): Performed by: NURSE PRACTITIONER

## 2024-01-07 PROCEDURE — 84100 ASSAY OF PHOSPHORUS: CPT

## 2024-01-07 PROCEDURE — 700102 HCHG RX REV CODE 250 W/ 637 OVERRIDE(OP): Performed by: EMERGENCY MEDICINE

## 2024-01-07 PROCEDURE — 770022 HCHG ROOM/CARE - ICU (200)

## 2024-01-07 PROCEDURE — 700111 HCHG RX REV CODE 636 W/ 250 OVERRIDE (IP): Mod: JZ | Performed by: INTERNAL MEDICINE

## 2024-01-07 PROCEDURE — A9270 NON-COVERED ITEM OR SERVICE: HCPCS | Performed by: EMERGENCY MEDICINE

## 2024-01-07 PROCEDURE — 83735 ASSAY OF MAGNESIUM: CPT

## 2024-01-07 PROCEDURE — 99291 CRITICAL CARE FIRST HOUR: CPT | Performed by: EMERGENCY MEDICINE

## 2024-01-07 PROCEDURE — 99233 SBSQ HOSP IP/OBS HIGH 50: CPT | Performed by: INTERNAL MEDICINE

## 2024-01-07 PROCEDURE — 700101 HCHG RX REV CODE 250: Performed by: EMERGENCY MEDICINE

## 2024-01-07 PROCEDURE — 84478 ASSAY OF TRIGLYCERIDES: CPT

## 2024-01-07 PROCEDURE — 71045 X-RAY EXAM CHEST 1 VIEW: CPT

## 2024-01-07 PROCEDURE — 82962 GLUCOSE BLOOD TEST: CPT | Mod: 91

## 2024-01-07 PROCEDURE — 94640 AIRWAY INHALATION TREATMENT: CPT

## 2024-01-07 PROCEDURE — 700111 HCHG RX REV CODE 636 W/ 250 OVERRIDE (IP): Performed by: EMERGENCY MEDICINE

## 2024-01-07 PROCEDURE — 85610 PROTHROMBIN TIME: CPT

## 2024-01-07 PROCEDURE — 99292 CRITICAL CARE ADDL 30 MIN: CPT | Performed by: EMERGENCY MEDICINE

## 2024-01-07 RX ORDER — MIDAZOLAM HYDROCHLORIDE 1 MG/ML
4 INJECTION INTRAMUSCULAR; INTRAVENOUS ONCE
Status: COMPLETED | OUTPATIENT
Start: 2024-01-07 | End: 2024-01-07

## 2024-01-07 RX ORDER — BISACODYL 10 MG
10 SUPPOSITORY, RECTAL RECTAL
Status: DISCONTINUED | OUTPATIENT
Start: 2024-01-07 | End: 2024-01-11 | Stop reason: HOSPADM

## 2024-01-07 RX ORDER — HALOPERIDOL 5 MG/ML
5 INJECTION INTRAMUSCULAR ONCE
Status: COMPLETED | OUTPATIENT
Start: 2024-01-07 | End: 2024-01-07

## 2024-01-07 RX ORDER — POLYETHYLENE GLYCOL 3350 17 G/17G
1 POWDER, FOR SOLUTION ORAL
Status: DISCONTINUED | OUTPATIENT
Start: 2024-01-07 | End: 2024-01-11 | Stop reason: HOSPADM

## 2024-01-07 RX ORDER — QUETIAPINE FUMARATE 100 MG/1
100 TABLET, FILM COATED ORAL ONCE
Status: COMPLETED | OUTPATIENT
Start: 2024-01-07 | End: 2024-01-07

## 2024-01-07 RX ORDER — DIPHENHYDRAMINE HYDROCHLORIDE 50 MG/ML
50 INJECTION INTRAMUSCULAR; INTRAVENOUS ONCE
Status: COMPLETED | OUTPATIENT
Start: 2024-01-07 | End: 2024-01-07

## 2024-01-07 RX ORDER — AMOXICILLIN 250 MG
2 CAPSULE ORAL 2 TIMES DAILY
Status: DISCONTINUED | OUTPATIENT
Start: 2024-01-07 | End: 2024-01-11 | Stop reason: HOSPADM

## 2024-01-07 RX ADMIN — FENTANYL CITRATE 100 MCG: 50 INJECTION, SOLUTION INTRAMUSCULAR; INTRAVENOUS at 19:25

## 2024-01-07 RX ADMIN — METHYLPREDNISOLONE SODIUM SUCCINATE 62.5 MG: 125 INJECTION, POWDER, FOR SOLUTION INTRAMUSCULAR; INTRAVENOUS at 05:04

## 2024-01-07 RX ADMIN — IPRATROPIUM BROMIDE AND ALBUTEROL SULFATE 3 ML: 2.5; .5 SOLUTION RESPIRATORY (INHALATION) at 09:40

## 2024-01-07 RX ADMIN — HALOPERIDOL LACTATE 5 MG: 5 INJECTION, SOLUTION INTRAMUSCULAR at 18:17

## 2024-01-07 RX ADMIN — DOCUSATE SODIUM 50 MG AND SENNOSIDES 8.6 MG 2 TABLET: 8.6; 5 TABLET, FILM COATED ORAL at 18:13

## 2024-01-07 RX ADMIN — FENTANYL CITRATE 200 MCG: 50 INJECTION, SOLUTION INTRAMUSCULAR; INTRAVENOUS at 15:42

## 2024-01-07 RX ADMIN — FENTANYL CITRATE 100 MCG: 50 INJECTION, SOLUTION INTRAMUSCULAR; INTRAVENOUS at 00:05

## 2024-01-07 RX ADMIN — MIDAZOLAM HYDROCHLORIDE 4 MG: 1 INJECTION, SOLUTION INTRAMUSCULAR; INTRAVENOUS at 20:56

## 2024-01-07 RX ADMIN — ENOXAPARIN SODIUM 40 MG: 100 INJECTION SUBCUTANEOUS at 17:36

## 2024-01-07 RX ADMIN — DEXMEDETOMIDINE 1.5 MCG/KG/HR: 100 INJECTION, SOLUTION INTRAVENOUS at 22:27

## 2024-01-07 RX ADMIN — IPRATROPIUM BROMIDE AND ALBUTEROL SULFATE 3 ML: 2.5; .5 SOLUTION RESPIRATORY (INHALATION) at 06:25

## 2024-01-07 RX ADMIN — AMPICILLIN SODIUM, SULBACTAM SODIUM 3 G: 2; 1 INJECTION, POWDER, FOR SOLUTION INTRAMUSCULAR; INTRAVENOUS at 17:35

## 2024-01-07 RX ADMIN — AMPICILLIN SODIUM, SULBACTAM SODIUM 3 G: 2; 1 INJECTION, POWDER, FOR SOLUTION INTRAMUSCULAR; INTRAVENOUS at 05:21

## 2024-01-07 RX ADMIN — Medication 350 MCG/HR: at 00:21

## 2024-01-07 RX ADMIN — IPRATROPIUM BROMIDE AND ALBUTEROL SULFATE 3 ML: 2.5; .5 SOLUTION RESPIRATORY (INHALATION) at 13:49

## 2024-01-07 RX ADMIN — DIPHENHYDRAMINE HYDROCHLORIDE 50 MG: 50 INJECTION, SOLUTION INTRAMUSCULAR; INTRAVENOUS at 09:24

## 2024-01-07 RX ADMIN — DOCUSATE SODIUM 50 MG AND SENNOSIDES 8.6 MG 2 TABLET: 8.6; 5 TABLET, FILM COATED ORAL at 05:05

## 2024-01-07 RX ADMIN — FENTANYL CITRATE 200 MCG: 50 INJECTION, SOLUTION INTRAMUSCULAR; INTRAVENOUS at 17:30

## 2024-01-07 RX ADMIN — QUETIAPINE FUMARATE 100 MG: 100 TABLET ORAL at 18:12

## 2024-01-07 RX ADMIN — HALOPERIDOL LACTATE 5 MG: 5 INJECTION, SOLUTION INTRAMUSCULAR at 22:29

## 2024-01-07 RX ADMIN — FENTANYL CITRATE 200 MCG: 50 INJECTION, SOLUTION INTRAMUSCULAR; INTRAVENOUS at 20:49

## 2024-01-07 RX ADMIN — Medication 350 MCG/HR: at 04:42

## 2024-01-07 RX ADMIN — FENTANYL CITRATE 100 MCG: 50 INJECTION, SOLUTION INTRAMUSCULAR; INTRAVENOUS at 07:43

## 2024-01-07 RX ADMIN — Medication 100 MCG/HR: at 20:37

## 2024-01-07 RX ADMIN — FENTANYL CITRATE 100 MCG: 50 INJECTION, SOLUTION INTRAMUSCULAR; INTRAVENOUS at 23:28

## 2024-01-07 RX ADMIN — PROPOFOL 80 MCG/KG/MIN: 10 INJECTION, EMULSION INTRAVENOUS at 06:24

## 2024-01-07 RX ADMIN — DEXMEDETOMIDINE 0.2 MCG/KG/HR: 100 INJECTION, SOLUTION INTRAVENOUS at 12:16

## 2024-01-07 RX ADMIN — PROPOFOL 80 MCG/KG/MIN: 10 INJECTION, EMULSION INTRAVENOUS at 02:40

## 2024-01-07 RX ADMIN — DEXMEDETOMIDINE 1.5 MCG/KG/HR: 100 INJECTION, SOLUTION INTRAVENOUS at 17:32

## 2024-01-07 RX ADMIN — FAMOTIDINE 20 MG: 20 TABLET ORAL at 05:05

## 2024-01-07 RX ADMIN — IPRATROPIUM BROMIDE AND ALBUTEROL SULFATE 3 ML: 2.5; .5 SOLUTION RESPIRATORY (INHALATION) at 18:55

## 2024-01-07 RX ADMIN — HALOPERIDOL LACTATE 5 MG: 5 INJECTION, SOLUTION INTRAMUSCULAR at 14:06

## 2024-01-07 RX ADMIN — IPRATROPIUM BROMIDE AND ALBUTEROL SULFATE 3 ML: 2.5; .5 SOLUTION RESPIRATORY (INHALATION) at 03:01

## 2024-01-07 RX ADMIN — FAMOTIDINE 20 MG: 20 TABLET ORAL at 17:30

## 2024-01-07 RX ADMIN — FENTANYL CITRATE 100 MCG: 50 INJECTION, SOLUTION INTRAMUSCULAR; INTRAVENOUS at 20:17

## 2024-01-07 RX ADMIN — AMPICILLIN SODIUM, SULBACTAM SODIUM 3 G: 2; 1 INJECTION, POWDER, FOR SOLUTION INTRAMUSCULAR; INTRAVENOUS at 12:18

## 2024-01-07 RX ADMIN — IPRATROPIUM BROMIDE AND ALBUTEROL SULFATE 3 ML: 2.5; .5 SOLUTION RESPIRATORY (INHALATION) at 22:35

## 2024-01-07 RX ADMIN — PROPOFOL 80 MCG/KG/MIN: 10 INJECTION, EMULSION INTRAVENOUS at 09:07

## 2024-01-07 RX ADMIN — HALOPERIDOL LACTATE 5 MG: 5 INJECTION, SOLUTION INTRAMUSCULAR at 09:23

## 2024-01-07 RX ADMIN — MIDAZOLAM HYDROCHLORIDE 4 MG: 1 INJECTION, SOLUTION INTRAMUSCULAR; INTRAVENOUS at 09:23

## 2024-01-07 ASSESSMENT — PAIN DESCRIPTION - PAIN TYPE
TYPE: ACUTE PAIN

## 2024-01-07 ASSESSMENT — FIBROSIS 4 INDEX: FIB4 SCORE: 0.66

## 2024-01-07 NOTE — PROGRESS NOTES
Oncology/Hematology Progress Note               Author: Philip Hebert M.D. Date & Time created: 1/7/2024  10:43 AM     Interval History:  Patient had excisional lymph node biopsy from left axilla, remains on ventilator and requiring high doses of sedative drugs    Review of Systems:  Review of Systems   Unable to perform ROS: Intubated       Physical Exam:  Physical Exam  Vitals and nursing note reviewed.   HENT:      Head: Normocephalic and atraumatic.      Right Ear: External ear normal.      Left Ear: External ear normal.      Nose: Nose normal.      Mouth/Throat:      Mouth: Mucous membranes are moist.      Pharynx: Oropharynx is clear.   Eyes:      Extraocular Movements: Extraocular movements intact.      Conjunctiva/sclera: Conjunctivae normal.      Pupils: Pupils are equal, round, and reactive to light.   Cardiovascular:      Rate and Rhythm: Normal rate and regular rhythm.      Pulses: Normal pulses.      Heart sounds: Normal heart sounds.   Pulmonary:      Effort: Pulmonary effort is normal.      Breath sounds: Normal breath sounds.   Abdominal:      General: Abdomen is flat. Bowel sounds are normal.      Palpations: Abdomen is soft.   Musculoskeletal:         General: Normal range of motion.      Cervical back: Normal range of motion.   Lymphadenopathy:      Cervical: Cervical adenopathy present.   Skin:     General: Skin is warm and dry.   Neurological:      Mental Status: She is disoriented.      Coordination: Coordination abnormal.         Labs:  Recent Labs     01/05/24  0143 01/05/24  0356 01/06/24  0407   ISTATAPH 7.307* 7.376* 7.374*   ISTATAPCO2 56.8* 46.4* 46.6*   ISTATAPO2 72 74 82   ISTATATCO2 30 29 29   [id number] 92* 94 96   ISTATARTHCO3 28.4* 27.2* 27.2*   ISTATARTBE 1 2 2   ISTATTEMP 98.6 F 98.1 F 97.7 F   ISTATFIO2 40 40 50   ISTATSPEC Arterial Arterial Arterial   ISTATAPHTC 7.307* 7.380* 7.381*   [id number] 72 73 80           Recent Labs     01/05/24  0202 01/06/24  0420 01/07/24  0429    SODIUM 137 142 142   POTASSIUM 4.7 4.2 4.3   CHLORIDE 102 106 107   CO2 26 26 25   BUN 11 7* 9   CREATININE 0.66 0.59 0.46*   MAGNESIUM 2.3 1.7 2.2   PHOSPHORUS 3.5 2.8 4.1   CALCIUM 8.6 8.1* 8.2*       Recent Labs     24  0202 24  0420 24   ALTSGPT 18 12 20   ASTSGOT 19 22 36   ALKPHOSPHAT 85 77 82   TBILIRUBIN 0.2 <0.2 <0.2   GLUCOSE 100* 96 124*       Recent Labs     24  0202 24  0355 24   RBC 3.53*  --  3.35* 3.47*   HEMOGLOBIN 10.7*  --  10.0* 10.3*   HEMATOCRIT 32.8*  --  32.1* 32.6*   PLATELETCT 363  --  363 391   PROTHROMBTM  --  15.2* 15.0* 14.9*   INR  --  1.18* 1.17* 1.15*       Recent Labs     24  02024  1000 24   WBC 22.1*  --  11.0* 12.6*   NEUTSPOLYS 89.10*  --  77.60* 89.70*   LYMPHOCYTES 3.70*  --  9.30* 4.80*   MONOCYTES 6.00  --  8.20 4.80   EOSINOPHILS 0.30 2 4.30 0.00   BASOPHILS 0.40  --  0.40 0.20   ASTSGOT 19  --  22 36   ALTSGPT 18  --  12 20   ALKPHOSPHAT 85  --  77 82   TBILIRUBIN 0.2  --  <0.2 <0.2       Recent Labs     24  02024  04224   SODIUM 137 142 142   POTASSIUM 4.7 4.2 4.3   CHLORIDE 102 106 107   CO2 26 26 25   GLUCOSE 100* 96 124*   BUN 11 7* 9   CREATININE 0.66 0.59 0.46*   CALCIUM 8.6 8.1* 8.2*       Hemodynamics:  No data recorded.  Monitored Temp: 36.5 °C (97.7 °F)  Pulse  Av.7  Min: 67  Max: 113   Blood Pressure: 98/54     Respiratory:  Vent Mode: APVCMV, Rate (breaths/min): 28, PEEP/CPAP: 8, PIP: 22, MAP: 12 Respiration: (!) 28, Pulse Oximetry: 93 %     Work Of Breathing / Effort: Vented  RUL Breath Sounds: Clear, RML Breath Sounds: Clear, RLL Breath Sounds: Inspiratory Wheezes;Crackles, IVAN Breath Sounds: Clear, LLL Breath Sounds: Inspiratory Wheezes;Crackles  Fluids:    Intake/Output Summary (Last 24 hours) at 2024 1308  Last data filed at 2024 1151  Gross per 24 hour   Intake 975.92 ml   Output 1670 ml   Net -694.08 ml      Weight: 63.6 kg (140 lb 3.4 oz)  GI/Nutrition:  Orders Placed This Encounter   Procedures    Diet NPO Restrict to: Strict (okay to receive meds through the NG/OG tube)     Standing Status:   Standing     Number of Occurrences:   1     Order Specific Question:   Type:     Answer:   Now [1]     Order Specific Question:   Diet NPO Restrict to:     Answer:   Strict [1]     Comments:   okay to receive meds through the NG/OG tube     Medical Decision Making, by Problem:  Active Hospital Problems    Diagnosis     *Acute respiratory failure with hypoxia (HCC) [J96.01]     Lymphadenopathy [R59.1]     Pulmonary emboli (HCC) [I26.99]     Asthma [J45.909]        Plan:  Lymphoma - awaiting pathology from excisional lymph node biopsy. May need to consider initial cycle of chemotherapy or single agent Rituxan to debulk disease. Eventually would benefit from PET/CT and bone marrow biopsy to complete staging. LP may also be indicated depending on pathology.  Respiratory failure - ventilator management per critical care      Case discussed with critical care    Quality-Core Measures   Poole catheter::  Critically Ill - Requiring Accurate Measurement of Urinary Output  DVT prophylaxis pharmacological::  Enoxaparin (Lovenox)  DVT prophylaxis - mechanical:  SCDs  Assessed for rehabilitation services:  Patient unable to tolerate rehabilitation therapeutic regimen    Philip Hebert M.D.

## 2024-01-07 NOTE — CARE PLAN
The patient is Watcher - Medium risk of patient condition declining or worsening    Shift Goals  Clinical Goals: wean sedation, follow commands  Patient Goals: n/a  Family Goals: wean sedation/follow commands    Progress made toward(s) clinical / shift goals:    Problem: Pain - Standard  Goal: Alleviation of pain or a reduction in pain to the patient’s comfort goal  Outcome: Not Progressing     Problem: Skin Integrity  Goal: Skin integrity is maintained or improved  Outcome: Progressing     Problem: Safety - Medical Restraint  Goal: Remains free of injury from restraints (Restraint for Interference with Medical Device)  Outcome: Not Progressing       Patient is not progressing towards the following goals:      Problem: Pain - Standard  Goal: Alleviation of pain or a reduction in pain to the patient’s comfort goal  Outcome: Not Progressing     Problem: Safety - Medical Restraint  Goal: Remains free of injury from restraints (Restraint for Interference with Medical Device)  Outcome: Not Progressing

## 2024-01-07 NOTE — PROGRESS NOTES
"Critical Care Progress Note    Date of admission  1/4/2024    Chief Complaint  \"33 y.o. female who presented 1/4/2024 with a past medical history of anxiety, asthma, and a history of progressive lymphadenopathy over the last several months to the ED yesterday with acute dyspnea which she described as to the emergency physician as being fairly sudden onset, the patient has had multiple ED visits for respiratory illnesses and exacerbations related to inhalations and exposures and has had increasing episodes over the last month or 2.  In the ED she was reported as having bilateral wheezing tachypnea and accessory muscle use and tripoding, and was treated with bronchodilators, steroids and magnesium and low-dose ketamine.  She was then given some Versed. She was over the course of her ED stay she continued to worsen and have recurrent wheezing and was given epinephrine antihistamines as well as further anxiolytics.  She was also treated with antibiotics for possible pneumonia.  Ultimately she developed some hypoxemia and worsening respiratory distress and being unable to sit upright in and maintain her own airway and with reported intermittent stridor she was intubated.     He had a CT scan of her chest and soft tissue of her neck which was concerning for paratracheal mass, lymphadenopathy in her cervical and mediastinal and axillary region, and was transferred to [facility] for further workup.     At arrival here she is intubated sedated on mechanical ventilation.\"    Hospital Course  1/4-Admit ICU, IMV, new mediastinal mass and LAD  1/5-BAL done, to OR with ACGS for node biopsy, difficult to sedate on prop and fent, added LDK, BAL + empiric abx for dense secretions and possible PNA  1/6-VD 3, failed SAT multiple times today for agitation and desaturations, very difficult to sedate, started steroids for RAD and possible bronchospasm  1/7-VD 4, unable to SAT this AM due to severe agitation and desats, will transition to LDK " and dex today, with adjunct haldol and seroquel as need to try to SBT.    Interval Problem Update  Reviewed last 24 hour events:  Attempted cross titration to dex/LDK yesterday, unable, patient agitation and flailing and putting self in danger, had to re-sedate w prop    Neuro:   Fent 350  Prop 80    CV:  HR 80s  SBP       Resp:   APV  28/330/8/0.4  SP02 94-98%  CXR NAD    GI: No BM    I/O: -314  UOP: 1700    Tmax: AF  Heme: WBC 12    Abx:   (1/5- ) Unasyn    Micro:   PCT 0.79  RCP neg  BAL NGTD  Bcxs NGTD    Endo: BG WTR    LDA: PIV  SUP: H2RA  VTE: enox  Diet: TF        Review of Systems  Review of Systems   Unable to perform ROS: Critical illness        Vital Signs for last 24 hours   Pulse:  [] 78  Resp:  [16-43] 20  BP: ()/(46-77) 124/63  SpO2:  [90 %-99 %] 93 %    Hemodynamic parameters for last 24 hours       Respiratory Information for the last 24 hours  Vent Mode: ASV  Rate (breaths/min): 28  Vt Target (mL): 350  PEEP/CPAP: 8  MAP: 12  Control VTE (exp VT): 404    Physical Exam   Physical Exam  Constitutional:       General: She is not in acute distress.     Appearance: She is ill-appearing.   HENT:      Head: Normocephalic.      Mouth/Throat:      Mouth: Mucous membranes are moist.   Eyes:      Extraocular Movements: Extraocular movements intact.      Pupils: Pupils are equal, round, and reactive to light.   Cardiovascular:      Rate and Rhythm: Regular rhythm. Tachycardia present.      Heart sounds: No murmur heard.  Pulmonary:      Effort: Respiratory distress present.      Breath sounds: No wheezing.      Comments: He does have a single wheezing in the center of her chest which is associated with Ventilator cycle, she does not have diffuse wheezing in her distal airways, she has good airflow no obstructive physiology on the ventilator  Musculoskeletal:      Right lower leg: No edema.      Left lower leg: No edema.   Lymphadenopathy:      Cervical: Cervical adenopathy present.   Skin:      General: Skin is warm.      Capillary Refill: Capillary refill takes less than 2 seconds.   Neurological:      Comments: Very deeply sedated on propofol  Desats with agitation  She doesn't follow, but responds briskly to light stim and becomes very agitated    She is CRUM    PIPPA  No clonus, no asymetries           Medications  Current Facility-Administered Medications   Medication Dose Route Frequency Provider Last Rate Last Admin    senna-docusate (Pericolace Or Senokot S) 8.6-50 MG per tablet 2 Tablet  2 Tablet Enteral Tube BID Murray Morris M.D.   2 Tablet at 01/07/24 1813    And    polyethylene glycol/lytes (Miralax) Packet 1 Packet  1 Packet Enteral Tube QDAY PRN Murray Morris M.D.        And    magnesium hydroxide (Milk Of Magnesia) suspension 30 mL  30 mL Enteral Tube QDAY PRN Murray Morris M.D.        And    bisacodyl (Dulcolax) suppository 10 mg  10 mg Rectal QDAY PRN Murray Morris M.D.        dexmedetomidine (PRECEDEX) 400 mcg/100mL NS premix infusion  0.1-1.5 mcg/kg/hr (Ideal) Intravenous Continuous Murray Morris M.D. 20.5 mL/hr at 01/07/24 1732 1.5 mcg/kg/hr at 01/07/24 1732    methylPREDNISolone sod succ (SOLU-MEDROL) 125 MG injection 62.5 mg  62.5 mg Intravenous DAILY Murray Morris M.D.   62.5 mg at 01/07/24 0504    ketamine (Ketalar) 500 mg in 250 mL NS infusion (continuous for pain)  0.3 mg/kg/hr Intravenous Continuous Murray Morris M.D. 8.9 mL/hr at 01/07/24 1411 0.3 mg/kg/hr at 01/07/24 1411    haloperidol lactate (Haldol) injection 5 mg  5 mg Intravenous Q4HRS PRN Murray Morris M.D.   5 mg at 01/07/24 1817    enoxaparin (Lovenox) inj 40 mg  40 mg Subcutaneous DAILY AT 1800 Philip Hebert M.D.   40 mg at 01/07/24 1736    fentaNYL (Sublimaze) injection 100 mcg  100 mcg Intravenous Q15 MIN PRN Murray Morris M.D.   100 mcg at 01/07/24 2017    And    fentaNYL (Sublimaze) injection 200 mcg  200 mcg Intravenous Q15 MIN PRN Murray Morris M.D.   200 mcg  at 01/07/24 2049    And    fentaNYL (SUBLIMAZE) 50 mcg/mL in 50mL (Continuous Infusion)   Intravenous Continuous Murray Morris M.D. 2 mL/hr at 01/07/24 2037 100 mcg/hr at 01/07/24 2037    And    propofol (DIPRIVAN) injection  0-80 mcg/kg/min (Ideal) Intravenous Continuous Murray Morris M.D.   Stopped at 01/07/24 1358    ampicillin/sulbactam (Unasyn) 3 g in  mL IVPB  3 g Intravenous Q6HRS Murray Morris M.D.   Stopped at 01/07/24 1805    insulin regular (HumuLIN R,NovoLIN R) injection  2-9 Units Subcutaneous Q6HRS Murray Morris M.D.   3 Units at 01/04/24 0733    And    dextrose 10 % BOLUS 25 g  25 g Intravenous Q15 MIN PRN Murray Morris M.D. 999 mL/hr at 01/05/24 1808 25 g at 01/05/24 1808    Respiratory Therapy Consult   Nebulization Continuous RT Murray Morris M.D.        famotidine (Pepcid) tablet 20 mg  20 mg Enteral Tube Q12HRS Murary Morris M.D.   20 mg at 01/07/24 1730    Or    famotidine (Pepcid) injection 20 mg  20 mg Intravenous Q12HRS Murray Morris M.D.   20 mg at 01/06/24 1715    MD Alert...ICU Electrolyte Replacement per Pharmacy   Other PHARMACY TO DOSE Murray Morris M.D.        lidocaine (Xylocaine) 1 % injection 2 mL  2 mL Tracheal Tube Q30 MIN PRN Murray Morris M.D.   5 mL at 01/05/24 0937    ipratropium-albuterol (DUONEB) nebulizer solution  3 mL Nebulization Q4HRS (RT) Murray Morris M.D.   3 mL at 01/07/24 1855       Fluids    Intake/Output Summary (Last 24 hours) at 1/7/2024 2108  Last data filed at 1/7/2024 1842  Gross per 24 hour   Intake 1034.4 ml   Output 1100 ml   Net -65.6 ml       Laboratory  Recent Labs     01/05/24  0143 01/05/24  0356 01/06/24  0407   ISTATAPH 7.307* 7.376* 7.374*   ISTATAPCO2 56.8* 46.4* 46.6*   ISTATAPO2 72 74 82   ISTATATCO2 30 29 29   OPRDHQT3UPM 92* 94 96   ISTATARTHCO3 28.4* 27.2* 27.2*   ISTATARTBE 1 2 2   ISTATTEMP 98.6 F 98.1 F 97.7 F   ISTATFIO2 40 40 50   ISTATSPEC Arterial Arterial Arterial    ISTATAPHTC 7.307* 7.380* 7.381*   [id number] 72 73 80         Recent Labs     01/05/24  0202 01/06/24 0420 01/07/24 0429   SODIUM 137 142 142   POTASSIUM 4.7 4.2 4.3   CHLORIDE 102 106 107   CO2 26 26 25   BUN 11 7* 9   CREATININE 0.66 0.59 0.46*   MAGNESIUM 2.3 1.7 2.2   PHOSPHORUS 3.5 2.8 4.1   CALCIUM 8.6 8.1* 8.2*     Recent Labs     01/05/24  0202 01/06/24 0420 01/07/24 0429   ALTSGPT 18 12 20   ASTSGOT 19 22 36   ALKPHOSPHAT 85 77 82   TBILIRUBIN 0.2 <0.2 <0.2   GLUCOSE 100* 96 124*     Recent Labs     01/05/24  0202 01/05/24  1000 01/06/24 0420 01/07/24 0429   WBC 22.1*  --  11.0* 12.6*   NEUTSPOLYS 89.10*  --  77.60* 89.70*   LYMPHOCYTES 3.70*  --  9.30* 4.80*   MONOCYTES 6.00  --  8.20 4.80   EOSINOPHILS 0.30 2 4.30 0.00   BASOPHILS 0.40  --  0.40 0.20   ASTSGOT 19  --  22 36   ALTSGPT 18  --  12 20   ALKPHOSPHAT 85  --  77 82   TBILIRUBIN 0.2  --  <0.2 <0.2     Recent Labs     01/05/24  0202 01/05/24  0355 01/06/24 0420 01/07/24 0429   RBC 3.53*  --  3.35* 3.47*   HEMOGLOBIN 10.7*  --  10.0* 10.3*   HEMATOCRIT 32.8*  --  32.1* 32.6*   PLATELETCT 363  --  363 391   PROTHROMBTM  --  15.2* 15.0* 14.9*   INR  --  1.18* 1.17* 1.15*       Imaging  X-Ray:  I have personally reviewed the images and compared with prior images.    Assessment/Plan  * Acute respiratory failure with hypoxia (HCC)- (present on admission)  Assessment & Plan  Intubated 1/4 at outside facility for refractory respiratory distress, stridor and wheezing    Not clear what the inciting event was, given secretions, leukocytosis and bronchoscopy findings, will give empiric unasyn and follow cultures  PCT 0.79 equivocal, deescalate as able    Ct chest with some GGOs, no PE.  Possible PNA versus pneumonitis.     1/6- Continues to fail SAT for severe agitation and desaturations, patient a danger to herself and nearly self-extubated  Very difficult to keep her sedated  Will attempt cross titration to dex today  1/7- Will attempt to use  adjunctive neuroleptics today (Haldol seroquel) to attempt to wean off fentanyl and propofol while maintaining some degree of sedation so as to attempt to get her to a level where we might be able to extubate.    Uncertain remains whether she will be able to breath spontaneously with extubation given the fact she was reported as having stridor and respiratory distress possibly on the basis of extrinsic airway compression in outside hospital.     That said a trial of extubation, even under suboptimal circumstances with respect to patient cooperativity with SBT may be the only way to liberate her from IMV>.    RT/O2 protocols  Daily and PRN VBG/ABGs  Titration of ventilator to optimize lung protection, gas exchange and acid-base status   Sedation as tolerated/indicated  Daily CXR  HOB >30 degrees and chlorhexidine for VAP prevention  Pepcid for GI prophylaxis  SAT/SBT when able (ABCDEF Bundle)  Early mobility        Lymphadenopathy- (present on admission)  Assessment & Plan  1/4 Chest CT with peritracheal, cervical and axillary LAD, with multi-station mediastinal LAD  Peritracheal mass represents a LN, likely lymphproliferative disorder  1/5- L axillary LN biopsy, awaiting path    No plan for chemo induction or XRT until path results    Onc consult    TLS labs stable will discontinue until chemo is initiated          Asthma- (present on admission)  Assessment & Plan  Reported wheezing at outside hospital  S/P nebs and steroids    No significant bronchspasm here and no obstructive physiology on the vent  Duonebs for now    1/6- Will start steroids today in the event she has some RAD component and for potential reduction in LAD   followed by 60mg qd x 3 days             I have performed a physical exam and reviewed and updated ROS and Plan today (1/7/2024). In review of yesterday's note (1/6/2024), there are no changes except as documented above.     Discussed patient condition and risk of morbidity and/or  mortality with Family, RN, RT, Therapies, Pharmacy, Patient, and oncology  The patient remains critically ill.  Critical care time = 85 minutes in directly providing and coordinating critical care and extensive data review.  No time overlap and excludes procedures.

## 2024-01-07 NOTE — CARE PLAN
The patient is Watcher - Medium risk of patient condition declining or worsening    Shift Goals  Clinical Goals: Patient safety; Reduce stimulation per MD; Protect airway  Patient Goals: YOLY  Family Goals: YOLY- no family present    Progress made toward(s) clinical / shift goals:    Problem: Pain - Standard  Goal: Alleviation of pain or a reduction in pain to the patient’s comfort goal  Outcome: Progressing     Problem: Fall Risk  Goal: Patient will remain free from falls  Outcome: Progressing     Problem: Safety - Medical Restraint  Goal: Remains free of injury from restraints (Restraint for Interference with Medical Device)  Outcome: Progressing  Goal: Free from restraint(s) (Restraint for Interference with Medical Device)  Outcome: Progressing       Patient is not progressing towards the following goals:

## 2024-01-08 ENCOUNTER — APPOINTMENT (OUTPATIENT)
Dept: RADIOLOGY | Facility: MEDICAL CENTER | Age: 34
DRG: 823 | End: 2024-01-08
Attending: EMERGENCY MEDICINE
Payer: MEDICAID

## 2024-01-08 DIAGNOSIS — R59.1 LYMPHADENOPATHY: ICD-10-CM

## 2024-01-08 PROBLEM — D64.9 NORMOCHROMIC NORMOCYTIC ANEMIA: Status: ACTIVE | Noted: 2024-01-08

## 2024-01-08 LAB
ALBUMIN SERPL BCP-MCNC: 2.9 G/DL (ref 3.2–4.9)
ALBUMIN/GLOB SERPL: 0.9 G/DL
ALP SERPL-CCNC: 90 U/L (ref 30–99)
ALT SERPL-CCNC: 22 U/L (ref 2–50)
ANION GAP SERPL CALC-SCNC: 12 MMOL/L (ref 7–16)
AST SERPL-CCNC: 42 U/L (ref 12–45)
BASOPHILS # BLD AUTO: 0.4 % (ref 0–1.8)
BASOPHILS # BLD: 0.05 K/UL (ref 0–0.12)
BILIRUB SERPL-MCNC: 0.3 MG/DL (ref 0.1–1.5)
BUN SERPL-MCNC: 17 MG/DL (ref 8–22)
CALCIUM ALBUM COR SERPL-MCNC: 9.2 MG/DL (ref 8.5–10.5)
CALCIUM SERPL-MCNC: 8.3 MG/DL (ref 8.5–10.5)
CHLORIDE SERPL-SCNC: 109 MMOL/L (ref 96–112)
CO2 SERPL-SCNC: 25 MMOL/L (ref 20–33)
CREAT SERPL-MCNC: 0.66 MG/DL (ref 0.5–1.4)
CYTOLOGY REG CYTOL: NORMAL
EOSINOPHIL # BLD AUTO: 0.07 K/UL (ref 0–0.51)
EOSINOPHIL NFR BLD: 0.6 % (ref 0–6.9)
ERYTHROCYTE [DISTWIDTH] IN BLOOD BY AUTOMATED COUNT: 47.9 FL (ref 35.9–50)
ERYTHROCYTE [SEDIMENTATION RATE] IN BLOOD BY WESTERGREN METHOD: 111 MM/HOUR (ref 0–25)
GFR SERPLBLD CREATININE-BSD FMLA CKD-EPI: 119 ML/MIN/1.73 M 2
GLOBULIN SER CALC-MCNC: 3.3 G/DL (ref 1.9–3.5)
GLUCOSE BLD STRIP.AUTO-MCNC: 104 MG/DL (ref 65–99)
GLUCOSE BLD STRIP.AUTO-MCNC: 76 MG/DL (ref 65–99)
GLUCOSE BLD STRIP.AUTO-MCNC: 97 MG/DL (ref 65–99)
GLUCOSE SERPL-MCNC: 113 MG/DL (ref 65–99)
HCT VFR BLD AUTO: 33.3 % (ref 37–47)
HGB BLD-MCNC: 11 G/DL (ref 12–16)
HIV 1+2 AB+HIV1 P24 AG SERPL QL IA: NORMAL
IMM GRANULOCYTES # BLD AUTO: 0.04 K/UL (ref 0–0.11)
IMM GRANULOCYTES NFR BLD AUTO: 0.4 % (ref 0–0.9)
INR PPP: 1.23 (ref 0.87–1.13)
LDH SERPL L TO P-CCNC: 344 U/L (ref 107–266)
LYMPHOCYTES # BLD AUTO: 1.07 K/UL (ref 1–4.8)
LYMPHOCYTES NFR BLD: 9.6 % (ref 22–41)
MCH RBC QN AUTO: 29.7 PG (ref 27–33)
MCHC RBC AUTO-ENTMCNC: 33 G/DL (ref 32.2–35.5)
MCV RBC AUTO: 90 FL (ref 81.4–97.8)
MONOCYTES # BLD AUTO: 1.21 K/UL (ref 0–0.85)
MONOCYTES NFR BLD AUTO: 10.9 % (ref 0–13.4)
NEUTROPHILS # BLD AUTO: 8.7 K/UL (ref 1.82–7.42)
NEUTROPHILS NFR BLD: 78.1 % (ref 44–72)
NRBC # BLD AUTO: 0 K/UL
NRBC BLD-RTO: 0 /100 WBC (ref 0–0.2)
PLATELET # BLD AUTO: 430 K/UL (ref 164–446)
PMV BLD AUTO: 8.7 FL (ref 9–12.9)
POTASSIUM SERPL-SCNC: 3.8 MMOL/L (ref 3.6–5.5)
PROT SERPL-MCNC: 6.2 G/DL (ref 6–8.2)
PROTHROMBIN TIME: 15.6 SEC (ref 12–14.6)
RBC # BLD AUTO: 3.7 M/UL (ref 4.2–5.4)
SODIUM SERPL-SCNC: 146 MMOL/L (ref 135–145)
WBC # BLD AUTO: 11.1 K/UL (ref 4.8–10.8)

## 2024-01-08 PROCEDURE — 94640 AIRWAY INHALATION TREATMENT: CPT

## 2024-01-08 PROCEDURE — 700111 HCHG RX REV CODE 636 W/ 250 OVERRIDE (IP): Mod: JZ | Performed by: INTERNAL MEDICINE

## 2024-01-08 PROCEDURE — 700102 HCHG RX REV CODE 250 W/ 637 OVERRIDE(OP): Performed by: INTERNAL MEDICINE

## 2024-01-08 PROCEDURE — A9270 NON-COVERED ITEM OR SERVICE: HCPCS | Performed by: INTERNAL MEDICINE

## 2024-01-08 PROCEDURE — 85610 PROTHROMBIN TIME: CPT

## 2024-01-08 PROCEDURE — 700101 HCHG RX REV CODE 250: Performed by: EMERGENCY MEDICINE

## 2024-01-08 PROCEDURE — 85025 COMPLETE CBC W/AUTO DIFF WBC: CPT

## 2024-01-08 PROCEDURE — 71045 X-RAY EXAM CHEST 1 VIEW: CPT

## 2024-01-08 PROCEDURE — 700111 HCHG RX REV CODE 636 W/ 250 OVERRIDE (IP): Mod: JZ | Performed by: EMERGENCY MEDICINE

## 2024-01-08 PROCEDURE — 770022 HCHG ROOM/CARE - ICU (200)

## 2024-01-08 PROCEDURE — 82962 GLUCOSE BLOOD TEST: CPT

## 2024-01-08 PROCEDURE — 85652 RBC SED RATE AUTOMATED: CPT

## 2024-01-08 PROCEDURE — 99231 SBSQ HOSP IP/OBS SF/LOW 25: CPT | Performed by: STUDENT IN AN ORGANIZED HEALTH CARE EDUCATION/TRAINING PROGRAM

## 2024-01-08 PROCEDURE — 94150 VITAL CAPACITY TEST: CPT

## 2024-01-08 PROCEDURE — 94003 VENT MGMT INPAT SUBQ DAY: CPT

## 2024-01-08 PROCEDURE — 94799 UNLISTED PULMONARY SVC/PX: CPT

## 2024-01-08 PROCEDURE — G0475 HIV COMBINATION ASSAY: HCPCS

## 2024-01-08 PROCEDURE — 99292 CRITICAL CARE ADDL 30 MIN: CPT | Performed by: INTERNAL MEDICINE

## 2024-01-08 PROCEDURE — 700105 HCHG RX REV CODE 258: Performed by: EMERGENCY MEDICINE

## 2024-01-08 PROCEDURE — A9270 NON-COVERED ITEM OR SERVICE: HCPCS | Performed by: EMERGENCY MEDICINE

## 2024-01-08 PROCEDURE — 83615 LACTATE (LD) (LDH) ENZYME: CPT

## 2024-01-08 PROCEDURE — 92610 EVALUATE SWALLOWING FUNCTION: CPT

## 2024-01-08 PROCEDURE — 700102 HCHG RX REV CODE 250 W/ 637 OVERRIDE(OP): Performed by: EMERGENCY MEDICINE

## 2024-01-08 PROCEDURE — 80053 COMPREHEN METABOLIC PANEL: CPT

## 2024-01-08 PROCEDURE — 99233 SBSQ HOSP IP/OBS HIGH 50: CPT | Performed by: INTERNAL MEDICINE

## 2024-01-08 PROCEDURE — 99291 CRITICAL CARE FIRST HOUR: CPT | Performed by: INTERNAL MEDICINE

## 2024-01-08 RX ORDER — CLONIDINE HYDROCHLORIDE 0.1 MG/1
0.1 TABLET ORAL
Status: ON HOLD | COMMUNITY
Start: 2023-11-17 | End: 2024-01-09

## 2024-01-08 RX ORDER — ALPRAZOLAM 0.25 MG/1
0.25 TABLET ORAL EVERY 6 HOURS PRN
Status: DISCONTINUED | OUTPATIENT
Start: 2024-01-08 | End: 2024-01-09

## 2024-01-08 RX ORDER — FLUOXETINE HYDROCHLORIDE 20 MG/1
20 CAPSULE ORAL EVERY MORNING
Status: ON HOLD | COMMUNITY
Start: 2023-11-17 | End: 2024-01-09

## 2024-01-08 RX ADMIN — IPRATROPIUM BROMIDE AND ALBUTEROL SULFATE 3 ML: 2.5; .5 SOLUTION RESPIRATORY (INHALATION) at 11:09

## 2024-01-08 RX ADMIN — ALPRAZOLAM 0.25 MG: 0.25 TABLET ORAL at 13:32

## 2024-01-08 RX ADMIN — FENTANYL CITRATE 200 MCG: 50 INJECTION, SOLUTION INTRAMUSCULAR; INTRAVENOUS at 05:37

## 2024-01-08 RX ADMIN — IPRATROPIUM BROMIDE AND ALBUTEROL SULFATE 3 ML: 2.5; .5 SOLUTION RESPIRATORY (INHALATION) at 06:50

## 2024-01-08 RX ADMIN — IPRATROPIUM BROMIDE AND ALBUTEROL SULFATE 3 ML: 2.5; .5 SOLUTION RESPIRATORY (INHALATION) at 18:45

## 2024-01-08 RX ADMIN — HALOPERIDOL LACTATE 5 MG: 5 INJECTION, SOLUTION INTRAMUSCULAR at 03:18

## 2024-01-08 RX ADMIN — DEXMEDETOMIDINE 1.5 MCG/KG/HR: 100 INJECTION, SOLUTION INTRAVENOUS at 02:55

## 2024-01-08 RX ADMIN — FENTANYL CITRATE 200 MCG: 50 INJECTION, SOLUTION INTRAMUSCULAR; INTRAVENOUS at 02:58

## 2024-01-08 RX ADMIN — Medication 300 MCG/HR: at 03:25

## 2024-01-08 RX ADMIN — FENTANYL CITRATE 200 MCG: 50 INJECTION, SOLUTION INTRAMUSCULAR; INTRAVENOUS at 05:04

## 2024-01-08 RX ADMIN — FAMOTIDINE 20 MG: 20 TABLET ORAL at 05:31

## 2024-01-08 RX ADMIN — FENTANYL CITRATE 200 MCG: 50 INJECTION, SOLUTION INTRAMUSCULAR; INTRAVENOUS at 04:30

## 2024-01-08 RX ADMIN — DEXMEDETOMIDINE 1.3 MCG/KG/HR: 100 INJECTION, SOLUTION INTRAVENOUS at 07:23

## 2024-01-08 RX ADMIN — HALOPERIDOL LACTATE 5 MG: 5 INJECTION, SOLUTION INTRAMUSCULAR at 19:27

## 2024-01-08 RX ADMIN — AMPICILLIN SODIUM, SULBACTAM SODIUM 3 G: 2; 1 INJECTION, POWDER, FOR SOLUTION INTRAMUSCULAR; INTRAVENOUS at 00:26

## 2024-01-08 RX ADMIN — FENTANYL CITRATE 200 MCG: 50 INJECTION, SOLUTION INTRAMUSCULAR; INTRAVENOUS at 01:26

## 2024-01-08 RX ADMIN — IPRATROPIUM BROMIDE AND ALBUTEROL SULFATE 3 ML: 2.5; .5 SOLUTION RESPIRATORY (INHALATION) at 22:33

## 2024-01-08 RX ADMIN — AMPICILLIN SODIUM, SULBACTAM SODIUM 3 G: 2; 1 INJECTION, POWDER, FOR SOLUTION INTRAMUSCULAR; INTRAVENOUS at 05:30

## 2024-01-08 RX ADMIN — ENOXAPARIN SODIUM 40 MG: 100 INJECTION SUBCUTANEOUS at 17:12

## 2024-01-08 RX ADMIN — DOCUSATE SODIUM 50 MG AND SENNOSIDES 8.6 MG 2 TABLET: 8.6; 5 TABLET, FILM COATED ORAL at 05:31

## 2024-01-08 RX ADMIN — METHYLPREDNISOLONE SODIUM SUCCINATE 62.5 MG: 125 INJECTION, POWDER, FOR SOLUTION INTRAMUSCULAR; INTRAVENOUS at 05:31

## 2024-01-08 RX ADMIN — DEXMEDETOMIDINE 1.1 MCG/KG/HR: 100 INJECTION, SOLUTION INTRAVENOUS at 13:16

## 2024-01-08 RX ADMIN — FENTANYL CITRATE 200 MCG: 50 INJECTION, SOLUTION INTRAMUSCULAR; INTRAVENOUS at 00:27

## 2024-01-08 RX ADMIN — ALPRAZOLAM 0.25 MG: 0.25 TABLET ORAL at 19:10

## 2024-01-08 RX ADMIN — FENTANYL CITRATE 200 MCG: 50 INJECTION, SOLUTION INTRAMUSCULAR; INTRAVENOUS at 06:24

## 2024-01-08 RX ADMIN — IPRATROPIUM BROMIDE AND ALBUTEROL SULFATE 3 ML: 2.5; .5 SOLUTION RESPIRATORY (INHALATION) at 03:04

## 2024-01-08 ASSESSMENT — PAIN DESCRIPTION - PAIN TYPE
TYPE: ACUTE PAIN

## 2024-01-08 ASSESSMENT — PULMONARY FUNCTION TESTS: FVC: 1.2

## 2024-01-08 NOTE — FLOWSHEET NOTE
01/08/24 0734   Spontaneous Breathing Trial (SBT)   Length of Weaning Trial (Hours) .5   Weaning Parameters   RR (bpm) 17   $ FVC / Vital Capacity (liters)  1.2   NIF (cm H2O)  -34   Rapid Shallow Breathing Index (RR/VT) 12   Spontaneous VE 10.1   Spontaneous

## 2024-01-08 NOTE — PROGRESS NOTES
Medications/drips weaned and switched over to Precedex and Ketamine per  MAR and MD orders. Patient seems to be nodding appropriately, but will not follow any other commands. She has moments of high agitation then moments of calmness/lethargy Per Dr. Morris we can attempt SBT while patient is still on current drips.    1630: RN and RT at bedside to attempt SBT. Patient did not follow commands, and was having long apenic pauses in breathing, then large gasps. When sedation attempted to be weaned, patient highly agitated and unsafe. Did not tolerate, switched back to rest settings.    1730: Dr. Morris at bedside to discuss plan of care again. Will re-start tube feed per protocol, give one time dose of seroquel now, keep PRN medications the same, and drips the same. Will attempt to SAT and SBT again tomorrow.     Family at bedside and aware of current plan.

## 2024-01-08 NOTE — DIETARY
"Nutrition Support Assessment   Day 4 of admit.  Sol Jones is a 33 y.o. female with admitting DX of acute respiratory failure with hypoxia.      Current problem list:  Lymphadenopathy   Asthma      Assessment:  Estimated Nutritional Needs: based on:   Height: 162.6 cm (5' 4\")  Weight: 63.6 kg (140 lb 3.4 oz) via bed scale   Weight to Use in Calculations: 63.6 kg (140 lb 3.4 oz)  Body mass index is 24.07 kg/m²., BMI classification: normal     Calculation/Equation:   REE per VMY=9221 kcals/day (x1.8=1527 kcals/day)  RMR per PSU (VE: 7 L/min and Tmax x24: 37.3 C)=1512 kcals/day   Total Calories / day: 1272 - 1590 (Calories / k - 25)  Total Grams Protein / day: 64 - 77 (Grams Protein / k.0 - 1.2)     Evaluation:   RD consulted for tube feeding assessment. Pt intubated and sedated in critical care pt is NPO x3, nutrition support indicated.   OGT placed 1/4  Current clinical picture and MD progress notes reviewed. Pt with hx pf progressive lymphadenopathy. Intubated 1/4 given worsening respiratory distress.   Labs () Na 146 (H), Glu 113 (H), Ca 8.3 (L)  Meds: precedex @ 1.3 mcg/kg/hr, senna  Skin: no pressure injuries or edema noted   +BM pta      Malnutrition Risk: Criteria not assessed      Recommendations/Plan:  Initiate Osmolite 1.2 @ 55 mL/hr providing 1584 kcals, 73 grams protein and 1082 mL free water.   Fluids per MD   Monitor weights     RD following               "

## 2024-01-08 NOTE — THERAPY
"Speech Language Pathology   Clinical Swallow Evaluation     Patient Name: Sol Jones  AGE:  33 y.o., SEX:  female  Medical Record #: [id number]  Date of Service: 1/8/2024      History of Present Illness  32 y/o female direct admit on 1/4 for acute respiratory failure with hypoxia requiring intubation. Concern for mediastinal mass.     S/p Lymph node biopsy 1/5, s/p bronch 1/5. Intubated 1/4-1/8.     CMHx: Acute respiratory failure with hypoxia, asthma, lymphadenopathy  PMHx: Tobacco use, anxiety    CXR 1/8:  \"1.  Hazy bilateral lower lobe infiltrates.  2.  Superior mediastinal fullness, compatible with mediastinal masses, visible on CT January 5, 2024\"    General Information:  Vitals  Pulse Oximetry: 96 %  O2 (LPM): 2  O2 Delivery Device: Silicone Nasal Cannula  Level of Consciousness: Alert, Awake  Patient Behaviors: Anxious, Restless, Crying, Fatigue, Fearful  Orientation: Nodded \"Yes\" when asked \"Is your name Anh?\" Otherwise did not answer orientation questions  Follows Directives: Inconsistent      Prior Living Situation & Level of Function:  Communication: WFL  Swallowing: WFL       Oral Mechanism Evaluation:  Dentition: Good, Natural dentition   Facial Symmetry: Equal  Facial Sensation: Equal     Labial Observations: WFL   Lingual Observations: Pt did not follow commands to assess  Motor Speech: WFL for limited verbalizations            Laryngeal Function:  Secretion Management: Adequate  Voice Quality: Hoarse, Whisper, Breathy continuous  Cough: Perceptually weak  Comments: Cough raspy. Differing answers from patient re pain with cough/swallow/speech      Subjective  Pt inconsistently agreeable and cooperative with SLP evalutaion tasks. Did not verbalize until the end, became tearful and briefly asked family to leave the room. Agreeable to FEES/overall SLP recommendaitons but insistently on holding FEES until tomorrow: \"Please tomorrow morning.\"      Assessment  Current Method of Nutrition: NPO " until cleared by speech pathology  Positioning: Bed - Chair Position  Bolus Administration: SLP  O2 (LPM): 2 O2 Delivery Device: Silicone Nasal Cannula  Factor(s) Affecting Performance: Impaired endurance, Impaired mental status  Tracheostomy : No      Swallowing Trials:  Ice: WFL  Thin Liquid (TN0): Impaired  Liquidised (LQ3): WFL      Comments:   Inconsistently oral opening for bolus acceptance and inconsistent bolus stripping from spoon. Presumed complete AP transit with effective bolus formation evidenced by complete clearance of bolus from oral cavity with limited trials. One to two swallows appreciated per bolus. Immediate raspy, perceptually weak cough following sip of TN0 liquids, concerning for airway invasion.    Discussed concern for dysphagia and rationale for FEES. Pt is agreeable but was insistent that FEES be held until tomorrow morning. Discussed rationale for necessary PO meds and purpose of ice chip protocol. Pt became tearful but remained agreeable so long as FEES would be in the morning. Discussed with family and with RN.       Clinical Impressions  Pt presents with clinical indicators of and elevated risk for oropharyngeal dysphagia given intubation and concern for mediastinal mass. Pt would benefit from further evaluation of swallow using FEEES prior to meaningful initiation of PO. Pt should have ice chips after oral care with assistance from trained staff to mitigate the impacts of xerostomia and disuse atrophy as well as to provide comfort and aid with secretion management. Please provide necessary oral meds crushed in liquidized or puree.       Recommendations  NPO pending FEES   Ice chips after oral care and with supervision from staff or family   Instrumentation: FEES  Medication: Crush with applesauce, as appropriate, Non Oral  Supervision: 1:1 feeding with constant supervision  Oral Care: Q4h         SLP Treatment Plan  Treatment Plan: Dysphagia Treatment, Patient/Family/Caregiver  "Training  SLP Frequency: 4x Per Week  Estimated Duration: Until Therapy Goals Met      Anticipated Discharge Needs  Discharge Recommendations: Recommend post-acute placement for additional speech therapy services prior to discharge home   Therapy Recommendations Upon DC: Dysphagia Training, Patient / Family / Caregiver Education, Community Re-Integration        Patient / Family Goals  Patient / Family Goal #1: \"Please tomorrow morning.\"  Short Term Goals  Short Term Goal # 1: Pt will complete FEES w SLP to further evaluate swallow function and inform POC.      Juana Chowdhury, SLP   "

## 2024-01-08 NOTE — RESPIRATORY CARE
Extubation    Cuff leak noted Yes  Stridor present No  FiO2%: 30 % (01/08/24 0650)  O2 (LPM): 0 (01/08/24 0805)  Patient toleration No comlplications at this time  Events/Summary/Plan: Extubated per MD order (01/08/24 0805)

## 2024-01-08 NOTE — ASSESSMENT & PLAN NOTE
Likely related to Hodgkin's lymphoma and ?infiltrating bone marrow process  Conservative transfusion protocols  Transfuse for Hb<7

## 2024-01-08 NOTE — PROGRESS NOTES
Oncology/Hematology Progress Note               Author: Philip Hebert M.D. Date & Time created: 1/8/2024  8:09 AM     Interval History:  Patient had excisional lymph node biopsy from left axilla, remains on ventilator and requiring high doses of sedative drugs    Review of Systems:  Review of Systems   Unable to perform ROS: Intubated       Physical Exam:  Physical Exam  Vitals and nursing note reviewed.   HENT:      Head: Normocephalic and atraumatic.      Right Ear: External ear normal.      Left Ear: External ear normal.      Nose: Nose normal.      Mouth/Throat:      Mouth: Mucous membranes are moist.      Pharynx: Oropharynx is clear.   Eyes:      Extraocular Movements: Extraocular movements intact.      Conjunctiva/sclera: Conjunctivae normal.      Pupils: Pupils are equal, round, and reactive to light.   Cardiovascular:      Rate and Rhythm: Normal rate and regular rhythm.      Pulses: Normal pulses.      Heart sounds: Normal heart sounds.   Pulmonary:      Effort: Pulmonary effort is normal.      Breath sounds: Normal breath sounds.   Abdominal:      General: Abdomen is flat. Bowel sounds are normal.      Palpations: Abdomen is soft.   Musculoskeletal:         General: Normal range of motion.      Cervical back: Normal range of motion.   Lymphadenopathy:      Cervical: Cervical adenopathy present.   Skin:     General: Skin is warm and dry.   Neurological:      Mental Status: She is disoriented.      Coordination: Coordination abnormal.         Labs:  Recent Labs     01/06/24  0407   ISTATAPH 7.374*   ISTATAPCO2 46.6*   ISTATAPO2 82   ISTATATCO2 29   [id number] 96   ISTATARTHCO3 27.2*   ISTATARTBE 2   ISTATTEMP 97.7 F   ISTATFIO2 50   ISTATSPEC Arterial   ISTATAPHTC 7.381*   [id number] 80           Recent Labs     01/06/24  0420 01/07/24  0429 01/08/24  0503   SODIUM 142 142 146*   POTASSIUM 4.2 4.3 3.8   CHLORIDE 106 107 109   CO2 26 25 25   BUN 7* 9 17   CREATININE 0.59 0.46* 0.66   MAGNESIUM 1.7 2.2  --     PHOSPHORUS 2.8 4.1  --    CALCIUM 8.1* 8.2* 8.3*       Recent Labs     24  0429 24  0503   ALTSGPT 12 20 22   ASTSGOT 22 36 42   ALKPHOSPHAT 77 82 90   TBILIRUBIN <0.2 <0.2 0.3   GLUCOSE 96 124* 113*       Recent Labs     24  0503   RBC 3.35* 3.47* 3.70*   HEMOGLOBIN 10.0* 10.3* 11.0*   HEMATOCRIT 32.1* 32.6* 33.3*   PLATELETCT 363 391 430   PROTHROMBTM 15.0* 14.9* 15.6*   INR 1.17* 1.15* 1.23*       Recent Labs     24  0503   WBC 11.0* 12.6* 11.1*   NEUTSPOLYS 77.60* 89.70* 78.10*   LYMPHOCYTES 9.30* 4.80* 9.60*   MONOCYTES 8.20 4.80 10.90   EOSINOPHILS 4.30 0.00 0.60   BASOPHILS 0.40 0.20 0.40   ASTSGOT 22 36 42   ALTSGPT 12 20 22   ALKPHOSPHAT 77 82 90   TBILIRUBIN <0.2 <0.2 0.3       Recent Labs     249 24  0503   SODIUM 142 142 146*   POTASSIUM 4.2 4.3 3.8   CHLORIDE 106 107 109   CO2 26 25 25   GLUCOSE 96 124* 113*   BUN 7* 9 17   CREATININE 0.59 0.46* 0.66   CALCIUM 8.1* 8.2* 8.3*       Hemodynamics:  No data recorded.  Monitored Temp: 37.1 °C (98.8 °F)  Pulse  Av.2  Min: 63  Max: 114   Blood Pressure: 120/63     Respiratory:  Vent Mode: ASV, Rate (breaths/min): 28, PEEP/CPAP: 8, PIP: 16, MAP: 11 Respiration: 20, Pulse Oximetry: 96 %     Work Of Breathing / Effort: Vented  RUL Breath Sounds: Clear, RML Breath Sounds: Clear, RLL Breath Sounds: Diminished, IVAN Breath Sounds: Clear, LLL Breath Sounds: Diminished  Fluids:    Intake/Output Summary (Last 24 hours) at 2024 1308  Last data filed at 2024 1151  Gross per 24 hour   Intake 975.92 ml   Output 1670 ml   Net -694.08 ml        GI/Nutrition:  Orders Placed This Encounter   Procedures    Diet NPO Restrict to: Strict (okay to receive meds through the NG/OG tube)     Standing Status:   Standing     Number of Occurrences:   1     Order Specific Question:   Type:     Answer:   Now [1]     Order Specific Question:   Diet  NPO Restrict to:     Answer:   Strict [1]     Comments:   okay to receive meds through the NG/OG tube     Medical Decision Making, by Problem:  Active Hospital Problems    Diagnosis     *Acute respiratory failure with hypoxia (HCC) [J96.01]     Lymphadenopathy [R59.1]     Pulmonary emboli (HCC) [I26.99]     Asthma [J45.909]        Plan:  Lymphoma - awaiting pathology from excisional lymph node biopsy. May need to consider initial cycle of chemotherapy or single agent Rituxan to debulk disease. Eventually would benefit from PET/CT and bone marrow biopsy to complete staging. LP may also be indicated depending on pathology.  Respiratory failure - ventilator management per critical care      Case discussed with sister at bedside and Dr. Worthington    Quality-Core Measures   Poole catheter::  Critically Ill - Requiring Accurate Measurement of Urinary Output  DVT prophylaxis pharmacological::  Enoxaparin (Lovenox)  DVT prophylaxis - mechanical:  SCDs  Assessed for rehabilitation services:  Patient unable to tolerate rehabilitation therapeutic regimen    Philip Hebert M.D.

## 2024-01-08 NOTE — PROGRESS NOTES
Critical Care Progress Note    Date of admission  1/4/2024    Chief Complaint  Worsening dyspnea and asthma exacerbations over the past month    Hospital Course  Ms. Jones is a 33 year old female with the past medical history of anxiety, asthma, and a history of progressive lymphadenopathy over the last several months who presented to the ED on 1/4/2024 with acute dyspnea. The patient has had multiple ED visits for respiratory illnesses and exacerbations related to inhalations and exposures and has had increasing episodes over the last month.  In the ED she was reported as having bilateral wheezing, tachypnea, and accessory muscle use and tripoding, and was treated with bronchodilators, steroids and magnesium and low-dose ketamine.  Ultimately, the patient developed hypoxemia with worsening respiratory distress requiring intubation.  She underwent evaluation with CT scan of her chest and soft tissue of her neck which was concerning for paratracheal mass, lymphadenopathy in her cervical and mediastinal and axillary region.  She was transferred to [facility] for subspeciality care and admitted to the ICU.  1/4-Admit ICU, IMV, new mediastinal mass and LAD  1/5-BAL done, to OR with ACGS for node biopsy, difficult to sedate on prop and fent, added LDK, BAL + empiric abx for dense secretions and possible PNA  1/6-VD 3, failed SAT multiple times today for agitation and desaturations, very difficult to sedate, started steroids for RAD and possible bronchospasm  1/7-VD 4, unable to SAT this AM due to severe agitation and desats, will transition to LDK and dex today, with adjunct haldol and seroquel as need to try to SBT.    Interval Problem Update  Reviewed last 24 hour events:   - no events overnight   - SAT-->agitated and anxiety   - ketamine at 0.3   - Precedex at 1.1   - follows commands   - SR 70-90s   - -130s   - Tmax 98   - OG with TFs   - BM pta   - UOP of 340cc overnight, Poole   - 2 PIVs   - VD5   -  CXR(reviewed): mediastinal fullness noted, clear lungs   - SBT-->good parameters   - pepcid   - lovenox   - Unasyn 4/5-->stop due to negative cultures   - steroids   - creat 0.66    Yesterday's Events:  Attempted cross titration to dex/LDK yesterday, unable, patient agitation and flailing and putting self in danger, had to re-sedate w prop    Neuro:   Fent 350  Prop 80    CV:  HR 80s  SBP       Resp:   APV  28/330/8/0.4  SP02 94-98%  CXR NAD    GI: No BM    I/O: -314  UOP: 1700    Tmax: AF  Heme: WBC 12    Abx:   (1/5- ) Unasyn    Micro:   PCT 0.79  RCP neg  BAL NGTD  Bcxs NGTD    Endo: BG WTR    LDA: PIV  SUP: H2RA  VTE: enox  Diet: TF        Review of Systems  Review of Systems   Unable to perform ROS: Intubated        Vital Signs for last 24 hours   Pulse:  [] 68  Resp:  [16-43] 22  BP: ()/(51-81) 120/63  SpO2:  [90 %-99 %] 95 %    Hemodynamic parameters for last 24 hours       Respiratory Information for the last 24 hours  Vent Mode: ASV  Rate (breaths/min): 28  Vt Target (mL): 350  PEEP/CPAP: 8  MAP: 12  Control VTE (exp VT): 361    Physical Exam   Physical Exam  Vitals and nursing note reviewed.   Constitutional:       General: She is not in acute distress.     Appearance: She is ill-appearing. She is not toxic-appearing.   HENT:      Head: Normocephalic and atraumatic.      Right Ear: External ear normal.      Left Ear: External ear normal.      Nose: Nose normal. No rhinorrhea.      Mouth/Throat:      Mouth: Mucous membranes are moist.      Comments: ETT in place  Eyes:      General: No scleral icterus.     Conjunctiva/sclera: Conjunctivae normal.      Pupils: Pupils are equal, round, and reactive to light.   Cardiovascular:      Rate and Rhythm: Normal rate and regular rhythm.      Heart sounds: Normal heart sounds. No murmur heard.  Pulmonary:      Breath sounds: No wheezing.   Chest:      Chest wall: No tenderness.   Abdominal:      Palpations: Abdomen is soft.      Tenderness: There is  no abdominal tenderness. There is no guarding or rebound.   Genitourinary:     Comments: Poole in place  Musculoskeletal:         General: Normal range of motion.      Cervical back: Normal range of motion and neck supple.      Right lower leg: No edema.      Left lower leg: No edema.   Lymphadenopathy:      Cervical: No cervical adenopathy.   Skin:     General: Skin is warm and dry.      Capillary Refill: Capillary refill takes less than 2 seconds.      Findings: No rash.   Neurological:      General: No focal deficit present.      Mental Status: She is alert.      Cranial Nerves: No cranial nerve deficit.      Sensory: No sensory deficit.      Motor: No weakness.      Comments: Calmer today, following commands while on Precedex and low dose ketamine   Psychiatric:      Comments: Unable to assess         Medications  Current Facility-Administered Medications   Medication Dose Route Frequency Provider Last Rate Last Admin    senna-docusate (Pericolace Or Senokot S) 8.6-50 MG per tablet 2 Tablet  2 Tablet Enteral Tube BID Murray Morris M.D.   2 Tablet at 01/08/24 0531    And    polyethylene glycol/lytes (Miralax) Packet 1 Packet  1 Packet Enteral Tube QDAY PRN Murray Morris M.D.        And    magnesium hydroxide (Milk Of Magnesia) suspension 30 mL  30 mL Enteral Tube QDAY PRN Murray Morris M.D.        And    bisacodyl (Dulcolax) suppository 10 mg  10 mg Rectal QDAY PRN Murray Morris M.D.        dexmedetomidine (PRECEDEX) 400 mcg/100mL NS premix infusion  0.1-1.5 mcg/kg/hr (Ideal) Intravenous Continuous Murray Morris M.D. 20.5 mL/hr at 01/08/24 0656 1.5 mcg/kg/hr at 01/08/24 0656    methylPREDNISolone sod succ (SOLU-MEDROL) 125 MG injection 62.5 mg  62.5 mg Intravenous DAILY Murray Morris M.D.   62.5 mg at 01/08/24 0531    ketamine (Ketalar) 500 mg in 250 mL NS infusion (continuous for pain)  0.3 mg/kg/hr Intravenous Continuous Murray Morris M.D. 8.9 mL/hr at 01/08/24 0656 0.3  mg/kg/hr at 01/08/24 0656    haloperidol lactate (Haldol) injection 5 mg  5 mg Intravenous Q4HRS PRN Murray Morris M.D.   5 mg at 01/08/24 0318    enoxaparin (Lovenox) inj 40 mg  40 mg Subcutaneous DAILY AT 1800 Philip Hebert M.D.   40 mg at 01/07/24 1736    fentaNYL (Sublimaze) injection 100 mcg  100 mcg Intravenous Q15 MIN PRN Murray Morris M.D.   100 mcg at 01/07/24 2328    And    fentaNYL (Sublimaze) injection 200 mcg  200 mcg Intravenous Q15 MIN PRN Murray Morris M.D.   200 mcg at 01/08/24 0624    And    fentaNYL (SUBLIMAZE) 50 mcg/mL in 50mL (Continuous Infusion)   Intravenous Continuous Murray Morris M.D.   Stopped at 01/08/24 0627    And    propofol (DIPRIVAN) injection  0-80 mcg/kg/min (Ideal) Intravenous Continuous Murray Morris M.D.   Stopped at 01/07/24 1358    ampicillin/sulbactam (Unasyn) 3 g in  mL IVPB  3 g Intravenous Q6HRS Murray Morris M.D.   Stopped at 01/08/24 0600    insulin regular (HumuLIN R,NovoLIN R) injection  2-9 Units Subcutaneous Q6HRS Murray Morris M.D.   3 Units at 01/04/24 0733    And    dextrose 10 % BOLUS 25 g  25 g Intravenous Q15 MIN PRN Murray Morris M.D. 999 mL/hr at 01/05/24 1808 25 g at 01/05/24 1808    Respiratory Therapy Consult   Nebulization Continuous RT Murray Morris M.D.        famotidine (Pepcid) tablet 20 mg  20 mg Enteral Tube Q12HRS Murray Morris M.D.   20 mg at 01/08/24 0531    Or    famotidine (Pepcid) injection 20 mg  20 mg Intravenous Q12HRS Murray Morris M.D.   20 mg at 01/06/24 1715    MD Alert...ICU Electrolyte Replacement per Pharmacy   Other PHARMACY TO DOSE Murray Morris M.D.        lidocaine (Xylocaine) 1 % injection 2 mL  2 mL Tracheal Tube Q30 MIN PRN Murray Morris M.D.   5 mL at 01/05/24 0937    ipratropium-albuterol (DUONEB) nebulizer solution  3 mL Nebulization Q4HRS (RT) Murray Morris M.D.   3 mL at 01/08/24 0650       Fluids    Intake/Output Summary (Last 24 hours) at  1/8/2024 0658  Last data filed at 1/8/2024 0600  Gross per 24 hour   Intake 617.35 ml   Output 635 ml   Net -17.65 ml         Laboratory  Recent Labs     01/06/24  0407   ISTATAPH 7.374*   ISTATAPCO2 46.6*   ISTATAPO2 82   ISTATATCO2 29   [id number] 96   ISTATARTHCO3 27.2*   ISTATARTBE 2   ISTATTEMP 97.7 F   ISTATFIO2 50   ISTATSPEC Arterial   ISTATAPHTC 7.381*   [id number] 80           Recent Labs     01/06/24 0420 01/07/24  0429 01/08/24  0503   SODIUM 142 142 146*   POTASSIUM 4.2 4.3 3.8   CHLORIDE 106 107 109   CO2 26 25 25   BUN 7* 9 17   CREATININE 0.59 0.46* 0.66   MAGNESIUM 1.7 2.2  --    PHOSPHORUS 2.8 4.1  --    CALCIUM 8.1* 8.2* 8.3*       Recent Labs     01/06/24 0420 01/07/24 0429 01/08/24  0503   ALTSGPT 12 20 22   ASTSGOT 22 36 42   ALKPHOSPHAT 77 82 90   TBILIRUBIN <0.2 <0.2 0.3   GLUCOSE 96 124* 113*       Recent Labs     01/06/24 0420 01/07/24  0429 01/08/24  0503   WBC 11.0* 12.6* 11.1*   NEUTSPOLYS 77.60* 89.70* 78.10*   LYMPHOCYTES 9.30* 4.80* 9.60*   MONOCYTES 8.20 4.80 10.90   EOSINOPHILS 4.30 0.00 0.60   BASOPHILS 0.40 0.20 0.40   ASTSGOT 22 36 42   ALTSGPT 12 20 22   ALKPHOSPHAT 77 82 90   TBILIRUBIN <0.2 <0.2 0.3       Recent Labs     01/06/24 0420 01/07/24  0429 01/08/24  0503   RBC 3.35* 3.47* 3.70*   HEMOGLOBIN 10.0* 10.3* 11.0*   HEMATOCRIT 32.1* 32.6* 33.3*   PLATELETCT 363 391 430   PROTHROMBTM 15.0* 14.9* 15.6*   INR 1.17* 1.15* 1.23*         Imaging  X-Ray:  I have personally reviewed the images and compared with prior images.    Assessment/Plan  * Acute respiratory failure with hypoxia (HCC)- (present on admission)  Assessment & Plan  Intubated 1/4 at outside facility for refractory respiratory distress, stridor and wheezing  RT/O2 protocols  Daily and PRN VBG/ABGs  Titration of ventilator to optimize lung protection, gas exchange and acid-base status   Sedation as tolerated/indicated  Daily CXR  HOB >30 degrees and chlorhexidine for VAP prevention  Pepcid for GI  prophylaxis  SAT/SBT   Early mobility        Normochromic normocytic anemia- (present on admission)  Assessment & Plan  ?likely related to lymphadenopathy and ?infiltrating bone marrow process  Conservative transfusion protocols  Transfuse for Hb<7    Lymphadenopathy- (present on admission)  Assessment & Plan  1/4 Chest CT with peritracheal, cervical and axillary LAD, with multi-station mediastinal LAD  Peritracheal mass represents a LN, likely lymphproliferative disorder  1/5- L axillary LN biopsy, awaiting path  No plan for chemo induction or XRT until path results  Heme/Onc following  Cont steroids            Asthma- (present on admission)  Assessment & Plan  Cont scheduled steroids  Duonebs as needed  RT/O2 protocols         I have performed a physical exam and reviewed and updated ROS and Plan today (1/8/2024). In review of yesterday's note (1/7/2024), there are no changes except as documented above.     Discussed patient condition and risk of morbidity and/or mortality with Family, RN, RT, Therapies, Pharmacy, Patient, and oncology    The patient remains critically ill.  I have assessed and reassessed the respiratory status and made ventilator adjustments based upon arterial blood gas analysis, ventilator waveforms and airway mechanics.  I have assessed and reassessed the blood pressure, hemodynamics, cardiovascular status. This patient remains at high risk for worsening cardiopulmonary dysfunction and death without the above critical care interventions.    Critical care time = 109 minutes in directly providing and coordinating critical care and extensive data review.  No time overlap and excludes procedures.

## 2024-01-08 NOTE — CARE PLAN
Problem: Bronchoconstriction  Goal: Improve in air movement and diminished wheezing  Description: Target End Date:  2 to 3 days    1.  Implement inhaled treatments  2.  Evaluate and manage medication effects  Outcome: Not Met   Duo q4  Problem: Ventilation  Goal: Ability to achieve and maintain unassisted ventilation or tolerate decreased levels of ventilator support  Description: Target End Date:  4 days     Document on Vent flowsheet    1.  Support and monitor invasive and noninvasive mechanical ventilation  2.  Monitor ventilator weaning response  3.  Perform ventilator associated pneumonia prevention interventions  4.  Manage ventilation therapy by monitoring diagnostic test results  Outcome: Not Met     Ventilator Daily Summary    Vent Day # 5    Airway: 7.5@23    Ventilator settings: /+8/30%    Weaning trials: none    Respiratory Procedures: none    Plan: Continue current ventilator settings and wean mechanical ventilation as tolerated per physician orders.

## 2024-01-08 NOTE — CARE PLAN
Problem: Knowledge Deficit - Standard  Goal: Patient and family/care givers will demonstrate understanding of plan of care, disease process/condition, diagnostic tests and medications  Outcome: Progressing     Problem: Pain - Standard  Goal: Alleviation of pain or a reduction in pain to the patient’s comfort goal  Outcome: Progressing     Problem: Safety - Medical Restraint  Goal: Remains free of injury from restraints (Restraint for Interference with Medical Device)  Outcome: Progressing  Goal: Free from restraint(s) (Restraint for Interference with Medical Device)  Outcome: Progressing   The patient is Watcher - Medium risk of patient condition declining or worsening    Shift Goals  Clinical Goals: extubate safely, remain calm, mobilize, decrease sedation  Patient Goals: breathe  Family Goals: be here for patient to help calm her    Progress made toward(s) clinical / shift goals:  met    Patient is not progressing towards the following goals:

## 2024-01-09 ENCOUNTER — APPOINTMENT (OUTPATIENT)
Dept: RADIOLOGY | Facility: MEDICAL CENTER | Age: 34
DRG: 823 | End: 2024-01-09
Attending: INTERNAL MEDICINE
Payer: MEDICAID

## 2024-01-09 PROBLEM — E87.6 HYPOKALEMIA: Status: ACTIVE | Noted: 2024-01-09

## 2024-01-09 PROBLEM — C81.90 HODGKIN'S LYMPHOMA (HCC): Status: ACTIVE | Noted: 2024-01-09

## 2024-01-09 LAB
ALBUMIN SERPL BCP-MCNC: 3.1 G/DL (ref 3.2–4.9)
ALBUMIN/GLOB SERPL: 1 G/DL
ALP SERPL-CCNC: 90 U/L (ref 30–99)
ALT SERPL-CCNC: 27 U/L (ref 2–50)
ANION GAP SERPL CALC-SCNC: 11 MMOL/L (ref 7–16)
AST SERPL-CCNC: 47 U/L (ref 12–45)
BACTERIA BLD CULT: NORMAL
BACTERIA BLD CULT: NORMAL
BASOPHILS # BLD AUTO: 0.4 % (ref 0–1.8)
BASOPHILS # BLD: 0.04 K/UL (ref 0–0.12)
BILIRUB SERPL-MCNC: 0.4 MG/DL (ref 0.1–1.5)
BUN SERPL-MCNC: 19 MG/DL (ref 8–22)
CALCIUM ALBUM COR SERPL-MCNC: 9.1 MG/DL (ref 8.5–10.5)
CALCIUM SERPL-MCNC: 8.4 MG/DL (ref 8.5–10.5)
CHLORIDE SERPL-SCNC: 104 MMOL/L (ref 96–112)
CO2 SERPL-SCNC: 24 MMOL/L (ref 20–33)
CREAT SERPL-MCNC: 0.63 MG/DL (ref 0.5–1.4)
CRP SERPL HS-MCNC: 4.64 MG/DL (ref 0–0.75)
EOSINOPHIL # BLD AUTO: 0.25 K/UL (ref 0–0.51)
EOSINOPHIL NFR BLD: 2.6 % (ref 0–6.9)
ERYTHROCYTE [DISTWIDTH] IN BLOOD BY AUTOMATED COUNT: 47.3 FL (ref 35.9–50)
GFR SERPLBLD CREATININE-BSD FMLA CKD-EPI: 120 ML/MIN/1.73 M 2
GLOBULIN SER CALC-MCNC: 3 G/DL (ref 1.9–3.5)
GLUCOSE BLD STRIP.AUTO-MCNC: 78 MG/DL (ref 65–99)
GLUCOSE BLD STRIP.AUTO-MCNC: 83 MG/DL (ref 65–99)
GLUCOSE SERPL-MCNC: 91 MG/DL (ref 65–99)
HCT VFR BLD AUTO: 31.2 % (ref 37–47)
HGB BLD-MCNC: 10.3 G/DL (ref 12–16)
IMM GRANULOCYTES # BLD AUTO: 0.04 K/UL (ref 0–0.11)
IMM GRANULOCYTES NFR BLD AUTO: 0.4 % (ref 0–0.9)
INR PPP: 1.23 (ref 0.87–1.13)
LYMPHOCYTES # BLD AUTO: 0.95 K/UL (ref 1–4.8)
LYMPHOCYTES NFR BLD: 10 % (ref 22–41)
MCH RBC QN AUTO: 29.5 PG (ref 27–33)
MCHC RBC AUTO-ENTMCNC: 33 G/DL (ref 32.2–35.5)
MCV RBC AUTO: 89.4 FL (ref 81.4–97.8)
MONOCYTES # BLD AUTO: 1.05 K/UL (ref 0–0.85)
MONOCYTES NFR BLD AUTO: 11.1 % (ref 0–13.4)
NEUTROPHILS # BLD AUTO: 7.14 K/UL (ref 1.82–7.42)
NEUTROPHILS NFR BLD: 75.5 % (ref 44–72)
NRBC # BLD AUTO: 0 K/UL
NRBC BLD-RTO: 0 /100 WBC (ref 0–0.2)
PLATELET # BLD AUTO: 403 K/UL (ref 164–446)
PMV BLD AUTO: 8.6 FL (ref 9–12.9)
POTASSIUM SERPL-SCNC: 3.5 MMOL/L (ref 3.6–5.5)
PREALB SERPL-MCNC: 13 MG/DL (ref 18–38)
PROT SERPL-MCNC: 6.1 G/DL (ref 6–8.2)
PROTHROMBIN TIME: 15.6 SEC (ref 12–14.6)
RBC # BLD AUTO: 3.49 M/UL (ref 4.2–5.4)
SIGNIFICANT IND 70042: NORMAL
SIGNIFICANT IND 70042: NORMAL
SITE SITE: NORMAL
SITE SITE: NORMAL
SODIUM SERPL-SCNC: 139 MMOL/L (ref 135–145)
SOURCE SOURCE: NORMAL
SOURCE SOURCE: NORMAL
WBC # BLD AUTO: 9.5 K/UL (ref 4.8–10.8)

## 2024-01-09 PROCEDURE — 82962 GLUCOSE BLOOD TEST: CPT

## 2024-01-09 PROCEDURE — 84134 ASSAY OF PREALBUMIN: CPT

## 2024-01-09 PROCEDURE — 86140 C-REACTIVE PROTEIN: CPT

## 2024-01-09 PROCEDURE — 02HV33Z INSERTION OF INFUSION DEVICE INTO SUPERIOR VENA CAVA, PERCUTANEOUS APPROACH: ICD-10-PCS | Performed by: INTERNAL MEDICINE

## 2024-01-09 PROCEDURE — 80053 COMPREHEN METABOLIC PANEL: CPT

## 2024-01-09 PROCEDURE — A9270 NON-COVERED ITEM OR SERVICE: HCPCS | Mod: JZ | Performed by: INTERNAL MEDICINE

## 2024-01-09 PROCEDURE — 700111 HCHG RX REV CODE 636 W/ 250 OVERRIDE (IP): Mod: JZ | Performed by: EMERGENCY MEDICINE

## 2024-01-09 PROCEDURE — 700111 HCHG RX REV CODE 636 W/ 250 OVERRIDE (IP): Mod: JZ | Performed by: STUDENT IN AN ORGANIZED HEALTH CARE EDUCATION/TRAINING PROGRAM

## 2024-01-09 PROCEDURE — 700111 HCHG RX REV CODE 636 W/ 250 OVERRIDE (IP): Mod: JZ | Performed by: INTERNAL MEDICINE

## 2024-01-09 PROCEDURE — 99255 IP/OBS CONSLTJ NEW/EST HI 80: CPT | Performed by: HOSPITALIST

## 2024-01-09 PROCEDURE — 99292 CRITICAL CARE ADDL 30 MIN: CPT | Performed by: INTERNAL MEDICINE

## 2024-01-09 PROCEDURE — 85025 COMPLETE CBC W/AUTO DIFF WBC: CPT

## 2024-01-09 PROCEDURE — 94640 AIRWAY INHALATION TREATMENT: CPT

## 2024-01-09 PROCEDURE — 99232 SBSQ HOSP IP/OBS MODERATE 35: CPT | Performed by: STUDENT IN AN ORGANIZED HEALTH CARE EDUCATION/TRAINING PROGRAM

## 2024-01-09 PROCEDURE — 700105 HCHG RX REV CODE 258: Performed by: EMERGENCY MEDICINE

## 2024-01-09 PROCEDURE — 92612 ENDOSCOPY SWALLOW (FEES) VID: CPT

## 2024-01-09 PROCEDURE — 700111 HCHG RX REV CODE 636 W/ 250 OVERRIDE (IP): Mod: JZ | Performed by: HOSPITALIST

## 2024-01-09 PROCEDURE — 700101 HCHG RX REV CODE 250: Performed by: INTERNAL MEDICINE

## 2024-01-09 PROCEDURE — 700101 HCHG RX REV CODE 250: Performed by: EMERGENCY MEDICINE

## 2024-01-09 PROCEDURE — 85610 PROTHROMBIN TIME: CPT

## 2024-01-09 PROCEDURE — C1751 CATH, INF, PER/CENT/MIDLINE: HCPCS

## 2024-01-09 PROCEDURE — 700102 HCHG RX REV CODE 250 W/ 637 OVERRIDE(OP): Mod: JZ | Performed by: INTERNAL MEDICINE

## 2024-01-09 PROCEDURE — 99291 CRITICAL CARE FIRST HOUR: CPT | Performed by: INTERNAL MEDICINE

## 2024-01-09 PROCEDURE — 770000 HCHG ROOM/CARE - INTERMEDIATE ICU *

## 2024-01-09 RX ORDER — ZIPRASIDONE MESYLATE 20 MG/ML
20 INJECTION, POWDER, LYOPHILIZED, FOR SOLUTION INTRAMUSCULAR ONCE
Status: DISPENSED | OUTPATIENT
Start: 2024-01-09 | End: 2024-01-10

## 2024-01-09 RX ORDER — POTASSIUM CHLORIDE 20 MEQ/1
40 TABLET, EXTENDED RELEASE ORAL ONCE
Status: COMPLETED | OUTPATIENT
Start: 2024-01-09 | End: 2024-01-09

## 2024-01-09 RX ORDER — ZIPRASIDONE MESYLATE 20 MG/ML
10 INJECTION, POWDER, LYOPHILIZED, FOR SOLUTION INTRAMUSCULAR EVERY 4 HOURS PRN
Status: DISCONTINUED | OUTPATIENT
Start: 2024-01-09 | End: 2024-01-11 | Stop reason: HOSPADM

## 2024-01-09 RX ORDER — FLUOXETINE 20 MG/5ML
20 SOLUTION ORAL DAILY
Status: DISCONTINUED | OUTPATIENT
Start: 2024-01-09 | End: 2024-01-11

## 2024-01-09 RX ORDER — DIAZEPAM 5 MG/ML
5 INJECTION, SOLUTION INTRAMUSCULAR; INTRAVENOUS EVERY 4 HOURS PRN
Status: DISCONTINUED | OUTPATIENT
Start: 2024-01-09 | End: 2024-01-11 | Stop reason: HOSPADM

## 2024-01-09 RX ORDER — ONDANSETRON 2 MG/ML
4 INJECTION INTRAMUSCULAR; INTRAVENOUS EVERY 6 HOURS PRN
Status: DISCONTINUED | OUTPATIENT
Start: 2024-01-09 | End: 2024-01-11 | Stop reason: HOSPADM

## 2024-01-09 RX ORDER — ALPRAZOLAM 0.25 MG/1
0.25 TABLET ORAL 2 TIMES DAILY PRN
Status: DISCONTINUED | OUTPATIENT
Start: 2024-01-09 | End: 2024-01-09

## 2024-01-09 RX ORDER — IPRATROPIUM BROMIDE AND ALBUTEROL SULFATE 2.5; .5 MG/3ML; MG/3ML
3 SOLUTION RESPIRATORY (INHALATION)
Status: DISCONTINUED | OUTPATIENT
Start: 2024-01-09 | End: 2024-01-11 | Stop reason: HOSPADM

## 2024-01-09 RX ORDER — IPRATROPIUM BROMIDE AND ALBUTEROL SULFATE 2.5; .5 MG/3ML; MG/3ML
3 SOLUTION RESPIRATORY (INHALATION)
Status: DISCONTINUED | OUTPATIENT
Start: 2024-01-09 | End: 2024-01-09

## 2024-01-09 RX ORDER — ALBUTEROL SULFATE 90 UG/1
2 AEROSOL, METERED RESPIRATORY (INHALATION) EVERY 6 HOURS PRN
Status: DISCONTINUED | OUTPATIENT
Start: 2024-01-09 | End: 2024-01-11 | Stop reason: HOSPADM

## 2024-01-09 RX ORDER — HALOPERIDOL 5 MG/ML
5 INJECTION INTRAMUSCULAR EVERY 6 HOURS PRN
Status: DISCONTINUED | OUTPATIENT
Start: 2024-01-09 | End: 2024-01-11 | Stop reason: HOSPADM

## 2024-01-09 RX ADMIN — DIAZEPAM 5 MG: 10 INJECTION, SOLUTION INTRAMUSCULAR; INTRAVENOUS at 21:19

## 2024-01-09 RX ADMIN — ENOXAPARIN SODIUM 40 MG: 100 INJECTION SUBCUTANEOUS at 18:03

## 2024-01-09 RX ADMIN — FLUOXETINE HYDROCHLORIDE 20 MG: 20 SOLUTION ORAL at 09:54

## 2024-01-09 RX ADMIN — ALPRAZOLAM 0.25 MG: 0.25 TABLET ORAL at 01:20

## 2024-01-09 RX ADMIN — DEXMEDETOMIDINE 0.2 MCG/KG/HR: 100 INJECTION, SOLUTION INTRAVENOUS at 19:58

## 2024-01-09 RX ADMIN — HALOPERIDOL LACTATE 5 MG: 5 INJECTION, SOLUTION INTRAMUSCULAR at 00:21

## 2024-01-09 RX ADMIN — IPRATROPIUM BROMIDE AND ALBUTEROL SULFATE 3 ML: 2.5; .5 SOLUTION RESPIRATORY (INHALATION) at 02:48

## 2024-01-09 RX ADMIN — HALOPERIDOL LACTATE 5 MG: 5 INJECTION, SOLUTION INTRAMUSCULAR at 05:19

## 2024-01-09 RX ADMIN — IPRATROPIUM BROMIDE AND ALBUTEROL SULFATE 3 ML: 2.5; .5 SOLUTION RESPIRATORY (INHALATION) at 19:46

## 2024-01-09 RX ADMIN — POTASSIUM CHLORIDE 40 MEQ: 1500 TABLET, EXTENDED RELEASE ORAL at 08:36

## 2024-01-09 RX ADMIN — HALOPERIDOL LACTATE 5 MG: 5 INJECTION, SOLUTION INTRAMUSCULAR at 19:57

## 2024-01-09 RX ADMIN — METHYLPREDNISOLONE SODIUM SUCCINATE 62.5 MG: 125 INJECTION, POWDER, FOR SOLUTION INTRAMUSCULAR; INTRAVENOUS at 05:32

## 2024-01-09 RX ADMIN — ONDANSETRON 4 MG: 2 INJECTION INTRAMUSCULAR; INTRAVENOUS at 15:04

## 2024-01-09 RX ADMIN — DEXMEDETOMIDINE 0.2 MCG/KG/HR: 100 INJECTION, SOLUTION INTRAVENOUS at 10:58

## 2024-01-09 RX ADMIN — BUSPIRONE HYDROCHLORIDE 15 MG: 10 TABLET ORAL at 08:36

## 2024-01-09 RX ADMIN — IPRATROPIUM BROMIDE AND ALBUTEROL SULFATE 3 ML: 2.5; .5 SOLUTION RESPIRATORY (INHALATION) at 05:33

## 2024-01-09 ASSESSMENT — ENCOUNTER SYMPTOMS
ORTHOPNEA: 0
COUGH: 1
BLURRED VISION: 0
DIARRHEA: 0
STRIDOR: 0
NERVOUS/ANXIOUS: 0
VOMITING: 0
TREMORS: 0
WEIGHT LOSS: 0
FLANK PAIN: 0
FEVER: 0
FOCAL WEAKNESS: 0
CHILLS: 0
DEPRESSION: 0
SPUTUM PRODUCTION: 0
SENSORY CHANGE: 0
DIZZINESS: 0
BRUISES/BLEEDS EASILY: 0
SHORTNESS OF BREATH: 1
NECK PAIN: 0
MEMORY LOSS: 0
BACK PAIN: 0
SORE THROAT: 0
WHEEZING: 1
ABDOMINAL PAIN: 0
PALPITATIONS: 0
NERVOUS/ANXIOUS: 1
HEADACHES: 0
TINGLING: 0
NAUSEA: 0
DOUBLE VISION: 0
SPEECH CHANGE: 0
BLOOD IN STOOL: 0
HEARTBURN: 0

## 2024-01-09 ASSESSMENT — COGNITIVE AND FUNCTIONAL STATUS - GENERAL
CLIMB 3 TO 5 STEPS WITH RAILING: A LITTLE
HELP NEEDED FOR BATHING: A LITTLE
WALKING IN HOSPITAL ROOM: A LITTLE
MOBILITY SCORE: 18
TURNING FROM BACK TO SIDE WHILE IN FLAT BAD: A LITTLE
PERSONAL GROOMING: A LITTLE
STANDING UP FROM CHAIR USING ARMS: A LITTLE
EATING MEALS: A LITTLE
SUGGESTED CMS G CODE MODIFIER MOBILITY: CK
DRESSING REGULAR LOWER BODY CLOTHING: A LITTLE
MOVING TO AND FROM BED TO CHAIR: A LITTLE
DAILY ACTIVITIY SCORE: 18
MOVING FROM LYING ON BACK TO SITTING ON SIDE OF FLAT BED: A LITTLE
TOILETING: A LITTLE
SUGGESTED CMS G CODE MODIFIER DAILY ACTIVITY: CK
DRESSING REGULAR UPPER BODY CLOTHING: A LITTLE

## 2024-01-09 ASSESSMENT — PAIN DESCRIPTION - PAIN TYPE
TYPE: ACUTE PAIN

## 2024-01-09 ASSESSMENT — LIFESTYLE VARIABLES
ON A TYPICAL DAY WHEN YOU DRINK ALCOHOL HOW MANY DRINKS DO YOU HAVE: 0
EVER HAD A DRINK FIRST THING IN THE MORNING TO STEADY YOUR NERVES TO GET RID OF A HANGOVER: NO
AVERAGE NUMBER OF DAYS PER WEEK YOU HAVE A DRINK CONTAINING ALCOHOL: 0
HOW MANY TIMES IN THE PAST YEAR HAVE YOU HAD 5 OR MORE DRINKS IN A DAY: 0
TOTAL SCORE: 0
TOTAL SCORE: 0
EVER FELT BAD OR GUILTY ABOUT YOUR DRINKING: NO
HAVE PEOPLE ANNOYED YOU BY CRITICIZING YOUR DRINKING: NO
TOTAL SCORE: 0
HAVE YOU EVER FELT YOU SHOULD CUT DOWN ON YOUR DRINKING: NO
CONSUMPTION TOTAL: NEGATIVE
ALCOHOL_USE: NO

## 2024-01-09 ASSESSMENT — PATIENT HEALTH QUESTIONNAIRE - PHQ9
2. FEELING DOWN, DEPRESSED, IRRITABLE, OR HOPELESS: NOT AT ALL
1. LITTLE INTEREST OR PLEASURE IN DOING THINGS: NOT AT ALL
SUM OF ALL RESPONSES TO PHQ9 QUESTIONS 1 AND 2: 0

## 2024-01-09 NOTE — PROGRESS NOTES
Was notified by CT that the order for CT-PET scan was ordered incorrectly and should be ordered as a nuclear med study. CT tech updated Dr. Damon and has not heard back yet from her at this time.     Will update team in report.

## 2024-01-09 NOTE — PROGRESS NOTES
Critical Care Progress Note    Date of admission  1/4/2024    Chief Complaint  Worsening dyspnea and asthma exacerbations over the past month    Hospital Course  Ms. Jones is a 33 year old female with the past medical history of anxiety, asthma, and a history of progressive lymphadenopathy over the last several months who presented to the ED on 1/4/2024 with acute dyspnea. The patient has had multiple ED visits for respiratory illnesses and exacerbations related to inhalations and exposures and has had increasing episodes over the last month.  In the ED she was reported as having bilateral wheezing, tachypnea, and accessory muscle use and tripoding, and was treated with bronchodilators, steroids and magnesium and low-dose ketamine.  Ultimately, the patient developed hypoxemia with worsening respiratory distress requiring intubation.  She underwent evaluation with CT scan of her chest and soft tissue of her neck which was concerning for paratracheal mass, lymphadenopathy in her cervical and mediastinal and axillary region.  She was transferred to [facility] for subspeciality care and admitted to the ICU.  1/4-Admit ICU, IMV, new mediastinal mass and LAD  1/5-BAL done, to OR with ACGS for node biopsy, difficult to sedate on prop and fent, added LDK, BAL + empiric abx for dense secretions and possible PNA  1/6-VD 3, failed SAT multiple times today for agitation and desaturations, very difficult to sedate, started steroids for RAD and possible bronchospasm  1/7-VD 4, unable to SAT this AM due to severe agitation and desats, will transition to LDK and dex today, with adjunct haldol and seroquel as need to try to SBT.  1/8 - VD5, SAT/SBT-->extubated, titrating precedex, started prn Xanax due to severe anxiety, pathology (+) for Hodgkin's lymphoma    Interval Problem Update  Reviewed last 24 hour events:   - extubated yesterday   - anxiety ongoing and getting xanax and haldo   - precedex at 0.6   - a/ox4, RASS 0 to -1   - SR  70-90s   - SBP 90-120s   - afebrile   - NPO, FEES today, ice chips   - UOP of 550cc overnight, morel   - PIVs   - BM pta   - IS 1600   - no CXR today   - O2 2 lpm NC   - Duonebs every 4 hours   - lovenox   - Hb 10.3   - platelets 403   - K 3.5    Yesterday's Events:   - no events overnight   - SAT-->agitated and anxiety   - ketamine at 0.3   - Precedex at 1.1   - follows commands   - SR 70-90s   - -130s   - Tmax 98   - OG with TFs   - BM pta   - UOP of 340cc overnight, Morel   - 2 PIVs   - VD5   - CXR(reviewed): mediastinal fullness noted, clear lungs   - SBT-->good parameters   - pepcid   - lovenox   - Unasyn 4/5-->stop due to negative cultures   - steroids   - creat 0.66      Review of Systems  Review of Systems   Constitutional:  Positive for malaise/fatigue. Negative for chills and fever.   HENT:  Negative for congestion and sore throat.    Eyes:  Negative for blurred vision and double vision.   Respiratory:  Positive for cough, shortness of breath and wheezing. Negative for sputum production.    Cardiovascular:  Positive for chest pain. Negative for leg swelling.   Gastrointestinal:  Negative for blood in stool.   Genitourinary:  Negative for flank pain and hematuria.   Musculoskeletal:  Negative for back pain and neck pain.   Skin:  Negative for rash.   Endo/Heme/Allergies:  Does not bruise/bleed easily.   Psychiatric/Behavioral:  Negative for depression. The patient is nervous/anxious.         Vital Signs for last 24 hours   Temp:  [36.7 °C (98 °F)-37.2 °C (99 °F)] 37.1 °C (98.7 °F)  Pulse:  [53-91] 76  Resp:  [12-43] 15  BP: ()/(55-78) 105/56  SpO2:  [88 %-99 %] 97 %    Hemodynamic parameters for last 24 hours       Respiratory Information for the last 24 hours  Vent Mode: Spont  PEEP/CPAP: 8  P Support: 5  MAP: 11  Length of Weaning Trial (Hours): .5  Control VTE (exp VT): 396    Physical Exam   Physical Exam  Vitals and nursing note reviewed.   Constitutional:       General: She is not in  acute distress.     Appearance: She is ill-appearing. She is not toxic-appearing.      Comments: Sleeping while on Precedex   HENT:      Head: Normocephalic and atraumatic.      Right Ear: External ear normal.      Left Ear: External ear normal.      Nose: Nose normal. No rhinorrhea.      Mouth/Throat:      Mouth: Mucous membranes are moist.      Pharynx: Oropharynx is clear. No oropharyngeal exudate.   Eyes:      General: No scleral icterus.     Conjunctiva/sclera: Conjunctivae normal.      Pupils: Pupils are equal, round, and reactive to light.   Cardiovascular:      Rate and Rhythm: Normal rate and regular rhythm.      Heart sounds: Normal heart sounds. No murmur heard.  Pulmonary:      Breath sounds: No wheezing.   Chest:      Chest wall: No tenderness.   Abdominal:      Palpations: Abdomen is soft.      Tenderness: There is no abdominal tenderness. There is no guarding or rebound.   Genitourinary:     Comments: Poole in place  Musculoskeletal:         General: Normal range of motion.      Cervical back: Normal range of motion and neck supple.      Right lower leg: No edema.      Left lower leg: No edema.   Lymphadenopathy:      Cervical: No cervical adenopathy.   Skin:     General: Skin is warm and dry.      Capillary Refill: Capillary refill takes less than 2 seconds.      Findings: No rash.   Neurological:      General: No focal deficit present.      Mental Status: She is oriented to person, place, and time.      Cranial Nerves: No cranial nerve deficit.      Sensory: No sensory deficit.      Motor: No weakness.   Psychiatric:         Mood and Affect: Mood normal.         Behavior: Behavior normal.         Thought Content: Thought content normal.         Medications  Current Facility-Administered Medications   Medication Dose Route Frequency Provider Last Rate Last Admin    ipratropium-albuterol (DUONEB) nebulizer solution  3 mL Nebulization Q4H PRN (RT) Randal Maxwell M.D.   3 mL at 01/09/24 0484     ALPRAZolam (Xanax) tablet 0.25 mg  0.25 mg Oral Q6HRS PRN Zoe Werner M.D.   0.25 mg at 01/09/24 0120    senna-docusate (Pericolace Or Senokot S) 8.6-50 MG per tablet 2 Tablet  2 Tablet Enteral Tube BID Murray Morris M.D.   2 Tablet at 01/08/24 0531    And    polyethylene glycol/lytes (Miralax) Packet 1 Packet  1 Packet Enteral Tube QDAY PRN Murray Morris M.D.        And    magnesium hydroxide (Milk Of Magnesia) suspension 30 mL  30 mL Enteral Tube QDAY PRN Murray Morris M.D.        And    bisacodyl (Dulcolax) suppository 10 mg  10 mg Rectal QDAY PRN Murray Morris M.D.        dexmedetomidine (PRECEDEX) 400 mcg/100mL NS premix infusion  0.1-1.5 mcg/kg/hr (Ideal) Intravenous Continuous Murray Morris M.D. 8.2 mL/hr at 01/08/24 2215 0.6 mcg/kg/hr at 01/08/24 2215    haloperidol lactate (Haldol) injection 5 mg  5 mg Intravenous Q4HRS PRN Murray Morris M.D.   5 mg at 01/09/24 0519    enoxaparin (Lovenox) inj 40 mg  40 mg Subcutaneous DAILY AT 1800 Philip Hebert M.D.   40 mg at 01/08/24 1712    insulin regular (HumuLIN R,NovoLIN R) injection  2-9 Units Subcutaneous Q6HRS Murray Morris M.D.   3 Units at 01/04/24 0733    And    dextrose 10 % BOLUS 25 g  25 g Intravenous Q15 MIN PRN Murray Morris M.D. 999 mL/hr at 01/05/24 1808 25 g at 01/05/24 1808    Respiratory Therapy Consult   Nebulization Continuous RT Murray Morris M.D. MD Alert...ICU Electrolyte Replacement per Pharmacy   Other PHARMACY TO DOSE Murray Morris M.D.        ipratropium-albuterol (DUONEB) nebulizer solution  3 mL Nebulization Q4HRS (RT) Murray Morris M.D.   3 mL at 01/09/24 0248       Fluids    Intake/Output Summary (Last 24 hours) at 1/9/2024 0639  Last data filed at 1/9/2024 0600  Gross per 24 hour   Intake 500.48 ml   Output 1500 ml   Net -999.52 ml         Laboratory            Recent Labs     01/07/24  0429 01/08/24  0503 01/09/24  0436   SODIUM 142 146* 139   POTASSIUM 4.3 3.8 3.5*    CHLORIDE 107 109 104   CO2 25 25 24   BUN 9 17 19   CREATININE 0.46* 0.66 0.63   MAGNESIUM 2.2  --   --    PHOSPHORUS 4.1  --   --    CALCIUM 8.2* 8.3* 8.4*       Recent Labs     01/07/24 0429 01/08/24  0503 01/09/24  0436   ALTSGPT 20 22 27   ASTSGOT 36 42 47*   ALKPHOSPHAT 82 90 90   TBILIRUBIN <0.2 0.3 0.4   PREALBUMIN  --   --  13.0*   GLUCOSE 124* 113* 91       Recent Labs     01/07/24 0429 01/08/24  0503 01/09/24  0436   WBC 12.6* 11.1* 9.5   NEUTSPOLYS 89.70* 78.10* 75.50*   LYMPHOCYTES 4.80* 9.60* 10.00*   MONOCYTES 4.80 10.90 11.10   EOSINOPHILS 0.00 0.60 2.60   BASOPHILS 0.20 0.40 0.40   ASTSGOT 36 42 47*   ALTSGPT 20 22 27   ALKPHOSPHAT 82 90 90   TBILIRUBIN <0.2 0.3 0.4       Recent Labs     01/07/24 0429 01/08/24  0503 01/09/24  0436   RBC 3.47* 3.70* 3.49*   HEMOGLOBIN 10.3* 11.0* 10.3*   HEMATOCRIT 32.6* 33.3* 31.2*   PLATELETCT 391 430 403   PROTHROMBTM 14.9* 15.6* 15.6*   INR 1.15* 1.23* 1.23*         Imaging  X-Ray:  I have personally reviewed the images and compared with prior images.    Assessment/Plan  * Acute respiratory failure with hypoxia (HCC)- (present on admission)  Assessment & Plan  Intubated 1/4 at outside facility for refractory respiratory distress, stridor and wheezing  Extubated on 1/8  RT/O2 protocols  IS/early mobility        Hypokalemia- (present on admission)  Assessment & Plan  Replete to goal > 4    Normochromic normocytic anemia- (present on admission)  Assessment & Plan  Likely related to Hodgkin's lymphoma and ?infiltrating bone marrow process  Conservative transfusion protocols  Transfuse for Hb<7    Lymphadenopathy- (present on admission)  Assessment & Plan  Biopsies confirm Hodgkin's lymphoma  Heme/Onc following  PICC line to be placed today  Cont steroids  PET scan on 1/10 for staging  Chemo induction at this hospitalization            Asthma- (present on admission)  Assessment & Plan  Cont scheduled steroids  Duonebs as needed  RT/O2 protocols      Anxiety-  (present on admission)  Assessment & Plan  RASS goals -1 to +1  I am actively titrating Precedex for RASS goals  Xanax and haldol as needed  Resume home Buspar 15mg PO BID  Resume home Prozac 20mg PO daily       I have performed a physical exam and reviewed and updated ROS and Plan today (1/9/2024). In review of yesterday's note (1/8/2024), there are no changes except as documented above.     Discussed patient condition and risk of morbidity and/or mortality with Family, RN, RT, Therapies, Pharmacy, Patient, and oncology    The patient remains critically ill at this time requiring active titration of Precedex infusion for RASS goals of +1 to -1.  I have assessed and reassessed the patient's respiratory status, neurological status, and hemodynamics.  The patient remains at high risk of clinical deterioration, worsening vital organ dysfunction, and death without the above critical care interventions.    In the meantime, we will transfer the patient to the [facility] under the care of the hospitalist team.  The critical care team will sign off at this point.  However, should the patient have any more respiratory issues please urgently consult us and we will intervene as needed.    Critical care time = 105 minutes in directly providing and coordinating critical care and extensive data review.  No time overlap and excludes procedures.

## 2024-01-09 NOTE — PROGRESS NOTES
Consult Note: Hematology/Oncology       Primary Care:  Lacy Jernigan D.O.    No chief complaint on file.      Subjective:   History of Presenting Illness:  Sol Jones is a 33 y.o. female     Patient presented to the emergency room on January 4, 2024 with acute dyspnea.  Patient reported that she had multiple ER visits for respiratory illnesses and exacerbations.  In the emergency room she was having wheezes tachypnea tripoding.  She was treated with steroids bronchodilators and low-dose ketamine.  Patient developed hypoxemia and worsening respiratory distress and required intubation.  She underwent a CT of her chest and soft tissue of her neck which was concerning for paratracheal mass, lymphadenopathy in her cervical mediastinal and axillary region.    She was transferred to the ICU.      She underwent a biopsy and preliminary results are consistent with HL.     Past Medical History:   Diagnosis Date    ASTHMA     Ovarian cyst     Psychiatric disorder     anxiety        Past Surgical History:   Procedure Laterality Date    NODE BIOPSY Left 1/5/2024    Procedure: BIOPSY, LYMPH NODE;  Surgeon: Philip Hernandez M.D.;  Location: SURGERY [facility];  Service: General    GYN SURGERY      OTHER      L salpingectomy       Social History     Tobacco Use    Smoking status: Some Days     Current packs/day: 0.50     Average packs/day: 0.5 packs/day for 5.0 years (2.5 ttl pk-yrs)     Types: Cigarettes    Smokeless tobacco: Never   Vaping Use    Vaping Use: Former   Substance Use Topics    Alcohol use: Not Currently     Comment: very rare    Drug use: Not Currently     Types: Inhaled     Comment: marijuana        Family History   Problem Relation Age of Onset    Hypertension Mother        No Known Allergies    Current Facility-Administered Medications   Medication Dose Route Frequency Provider Last Rate Last Admin    ipratropium-albuterol (DUONEB) nebulizer solution  3 mL Nebulization Q4H PRN (RT) Randal  MOHAMUD Maxwell M.D.   3 mL at 01/09/24 0533    ALPRAZolam (Xanax) tablet 0.25 mg  0.25 mg Oral BID PRN Zoe Werner M.D.        haloperidol lactate (Haldol) injection 5 mg  5 mg Intravenous Q6HRS PRN Zoe Werner M.D.        FLUoxetine (PROzac) 20 MG/5ML solution 20 mg  20 mg Oral DAILY Zoe Werner M.D.   20 mg at 01/09/24 0954    busPIRone (Buspar) tablet 15 mg  15 mg Oral BID Zoe Werner M.D.   15 mg at 01/09/24 0836    dextrose 10 % BOLUS 25 g  25 g Intravenous Q15 MIN PRN Zoe Werner M.D.        senna-docusate (Pericolace Or Senokot S) 8.6-50 MG per tablet 2 Tablet  2 Tablet Enteral Tube BID Murray Morris M.D.   2 Tablet at 01/08/24 0531    And    polyethylene glycol/lytes (Miralax) Packet 1 Packet  1 Packet Enteral Tube QDAY PRN Murray Morris M.D.        And    magnesium hydroxide (Milk Of Magnesia) suspension 30 mL  30 mL Enteral Tube QDAY PRN Murray Morris M.D.        And    bisacodyl (Dulcolax) suppository 10 mg  10 mg Rectal QDAY PRN Murray Morris M.D.        dexmedetomidine (PRECEDEX) 400 mcg/100mL NS premix infusion  0.1-1.5 mcg/kg/hr (Ideal) Intravenous Continuous Murray Morris M.D.   Held at 01/09/24 1130    enoxaparin (Lovenox) inj 40 mg  40 mg Subcutaneous DAILY AT 1800 Philip Hebert M.D.   40 mg at 01/08/24 1712    Respiratory Therapy Consult   Nebulization Continuous RT Murray Morris M.D.        MD Alert...ICU Electrolyte Replacement per Pharmacy   Other PHARMACY TO DOSE Murray Morris M.D.        ipratropium-albuterol (DUONEB) nebulizer solution  3 mL Nebulization Q4HRS (RT) Murray Morris M.D.   3 mL at 01/09/24 0248       Review of Systems   Constitutional:  Negative for chills, fever, malaise/fatigue and weight loss.   HENT:  Negative for congestion, ear pain, nosebleeds and sore throat.    Eyes:  Negative for blurred vision.   Respiratory:  Positive for cough, shortness of breath and wheezing. Negative for sputum production.     Cardiovascular:  Positive for chest pain. Negative for palpitations, orthopnea and leg swelling.   Gastrointestinal:  Negative for abdominal pain, heartburn, nausea and vomiting.   Genitourinary:  Negative for dysuria, frequency and urgency.   Musculoskeletal:  Negative for neck pain.   Neurological:  Negative for dizziness, tingling, tremors, sensory change, focal weakness and headaches.   Endo/Heme/Allergies:  Does not bruise/bleed easily.   Psychiatric/Behavioral:  Negative for depression, memory loss and suicidal ideas.         ++ Anxiety   All other systems reviewed and are negative.      Problem list, medications, and allergies reviewed by myself today in Epic.     Objective:     Vitals:    01/09/24 0950 01/09/24 1000 01/09/24 1100 01/09/24 1200   BP:  103/66  99/58   Pulse:  67 70 60   Resp:  20 20 18   Temp:       TempSrc:  Bladder Bladder Bladder   SpO2: 96% 93% 95% 98%   Weight:       Height:           DESC; KARNOFSKY SCALE WITH ECOG EQUIVALENT: 80, Normal activity with effort; some signs or symptoms of disease (ECOG equivalent 1)    DISTRESS LEVEL: mild distress    Physical Exam  Deferred     Labs:   Most recent labs reviewed.  Please see the lab tab of chart review    Imaging:   Most recent images below have been independently reviewed by me.  Please see the imaging tab of chart review    Pathology:      Assessment/Plan:     Ms. Jones is a 32 yo F who presents with pathology consistent with Hodgkin's Lymphoma, Nodular Sclerosing type. FINAL READ PENDING.    I had a long discussion with the patient, her sister, her mother and father at bedside this AM.    I discussed the results of the pathology report and that preliminary read is consistent with HL, Nodular sclerosing type.     We discussed the next step is to determine her staging.  In order to properly stage patient requires a PET scan while inpatient.  This has been ordered and scheduled for noon tomorrow.  Her CT Scan does show enlarged L and R  cervical LN, and L supraclav node.  Right paratracheal mass measuring 5.0 x 5.9 cm. Multi station mediastinal lymphadenopathy. Pericardial lymphadenopathy.  Thus, the patient has bulky mediastinal disease as well as elevated ESR (111).      We then went on to discuss treatment and I informed her that this would be dictated by which stage she was after PET scan.  We briefly discussed ABVD versus BVAVD.      We touched on fertility preservation but patient is not concerned about this. Most recent preg test negative.    Echo reviewed.  EF 60%.      Plan  -PET to complete staging (absolutely necessary for staging) planned for tomorrow at noon   -hepatitis panel prior to initiating treatment  - PICC line to be placed today  -Would ideally proceed with BV AVD (if stage III or IV) based on [place]-1 trial vs ABVD x2 cycles.      Potential regimen     ABVD Regimen  28 day cycles for 2 cycles  Doxorubicin 25mg/m2 IV pushs on days 1 and 15  Bleomycin 10units/m2 IV over 10 minutes on days 1 through 15  Vinblastine 6ng/m2 IV over 5-10 minutes on days 1 and 15  Dacarbazine 375mg/m2 IV over 30 minutes on days 1 and 15     OR     The A+AVD regimen   1.2 mg of brentuximab vedotin per kilogram of body weight  25 mg of doxorubicin per square meter of body-surface area  6 mg of vinblastine per square meter  375 mg of dacarbazine per square meter        Treatment course precedes additional therapy, based on her subsequent Deauville score after restaging scans     Will discuss treatment with patient tomorrow after above tests are done     Macie Damon MD  Hematology/Oncology

## 2024-01-09 NOTE — THERAPY
"Speech Language Pathology   Flexible Endoscopic Evaluation of Swallowing (FEES)        Patient Name: Sol Jones  AGE:  33 y.o., SEX:  female  Medical Record #: [id number]  Date of Service: 1/9/2024      History of Present Illness  34 y/o female direct admit on 1/4 for acute respiratory failure with hypoxia requiring intubation. Concern for mediastinal mass.     S/p Lymph node biopsy 1/5, s/p bronch 1/5. Intubated 1/4-1/8.     CMHx: Acute respiratory failure with hypoxia, asthma, lymphadenopathy  PMHx: Tobacco use, anxiety    CXR 1/8:  \"1.  Hazy bilateral lower lobe infiltrates.  2.  Superior mediastinal fullness, compatible with mediastinal masses, visible on CT January 5, 2024\"      Pertinent Information  Current Method of Nutrition: NPO until cleared by speech pathology  Patient Behaviors: Anxious   Dentition: Good, Natural dentition   Feeding Tube: None   Tracheostomy: No   Factor(s) Affecting Performance: None     Discussed the risks, benefits, and alternatives of the FEES procedure. Patient/family acknowledged and agreed to proceed.      Assessment  Flexible Endoscopic Evaluation of Swallowing (FEES) completed at bedside today. The endoscope was passed transnasally via Left nare to evaluate the anatomy and physiology of swallowing. Pt generally tolerated the procedure despite anxiety; RN and family present in room to improve patient comfort during study.     Anatomic Findings: Bilateral space-occupying lesions on the posterior TVF, these can be consistent with endotracheal intubation  Vocal Fold Motion: Reduced L TVF movement compared to R, although bilateral movement. Glottal gap during phonation. Complete closure noted during cough  Secretion Management: Adequate  PO Trials: Ice Chips, Thin Liquid, Mildly Thick Liquid, Liquidised, Pudding, Soft & Bite Sized, Regular Solid, Mixed      Consistency PAS Score Timing Residue Comments   Thin Liquid 6 During swallow Vallecular Residue: Trace " (1%-5%)  Pyriform Sinus Residue: Trace (1%-5%) Tsp - PAS 1  Cup - PAS 1, PAS 6  Straw - PAS 1 x2, PAS 6   Mildly Thick 1 N/A Vallecular Residue: Trace (1%-5%)  Pyriform Sinus Residue: Trace (1%-5%) Straw, sequential straw   Liquidised 1 N/A Vallecular Residue: None (0%)  Pyriform Sinus Residue: None (0%)    Pudding 1 N/A Vallecular Residue: None (0%)  Pyriform Sinus Residue: Trace (1%-5%)    Soft & Bite Sized 1 N/A Vallecular Residue: None (0%)  Pyriform Sinus Residue: None (0%)    Regular Solid 1 N/A Vallecular Residue: None (0%)  Pyriform Sinus Residue: None (0%)    Mixed 1 N/A Vallecular Residue: Trace (1%-5%)  Pyriform Sinus Residue: Trace (1%-5%)      Penetration-Aspiration Scale (PAS)  1     No contrast enters airway  2     Contrast enters the airway, remains above the vocal folds, and is ejected from the airway (not seen in the airway at the end of the swallow).  3     Contrast enters the airway, remains above the vocal folds, and is not ejected from the airway (is seen in the airway after the swallow).  4     Contrast enters the airway, contacts the vocal folds, and is ejected from the airway.  5     Contrast enters the airway, contacts the vocal folds, and is not ejected from the airway  6     Contrast enters the airway, crosses the plane of the vocal folds, and is ejected from the airway.  7     Contrast enters the airway, crosses the plane of the vocal folds, and is not ejected from the airway despite effort.  8     Contrast enters the airway, crosses the plane of the vocal folds, is not ejected from the airway and there is no response to aspiration.      Oral phase:  Adequate bolus stripping from spoon and straw suction. Able to give herself cup sips. Functional and timely mastication of RG7 and SB6. Containment WFL.       Pharyngeal phase:  Pharyngeal phase characterized by impairments in laryngeal vestibule closure (LVC), which resulted in the following:  - Consistent flash aspiration of TN0 by cup and  straw. Aspirate did clear from the airway using spontaneous cough; cough was explosive and resulted in whole-body reaction, but pt did not report pain with cough  - Trace residue as described in the chart above can be a variation of normal    Of note - patient also coughed to clear secretions from trachea during study        Compensatory Strategies:  3-sec prep - INEFFECTIVE for preventing aspiration of TN0  Changed method of intake - INEFFECTIVE for preventing aspiration of TN0    Cough - EFFECTIVE to clear aspirate      Severity Rating:  PAMELA: Mild      Clinical Impressions  The pt presents with a mild oropharyngeal dysphagia, likely acute related to respiratory failure and endotracheal intubation. Swallow safety is impaired; pharyngeal efficiency is intact. Discussed options for dysphagia management with patient, who at this time wants to modified liquids instead of using cough strategy. Recommend regular solids at this time with mildly thick liquids. Pt may not need repeat study given sensate response to aspiration events. Pt appears to be an excellent candidate for ongoing behavorial and exercise-based swallow rehabilitation.  Consider training the following evidence-based swallowing exercises in therapy based on patient-specific pathophysiology : supraglottic swallow, effortful swallow, Mendelsohn. Dysphagia outcomes can be maximized with use of mobility as pt is able and frequent, thorough oral care.         Recommendations  PET diet per MD with mildly thick liquids  Cleared for regular solids following procedure  Pt may have thin water after oral care and in between meals  Medication:  Whole with MT2 wash  Supervision: Distant supervision - check on patient 2-3 times per meal  Positioning: Fully upright and midline during oral intake, Meals sitting upright in a chair, as tolerated  Strategies: Small bites/sips, Slow rate of intake, Reduce environmental distractions  Oral Care: BID  Additional  "Instrumentation: None         SLP Treatment Plan  Treatment Plan: Dysphagia Treatment, Patient/Family/Caregiver Training  SLP Frequency: 4x Per Week  Estimated Duration: Until Therapy Goals Met      Anticipated Discharge Needs  Discharge Recommendations: Recommend outpatient speech therapy services   Therapy Recommendations Upon DC: Dysphagia Training, Patient / Family / Caregiver Education, Community Re-Integration       Patient / Family Goals  Patient / Family Goal #1: \"Please tomorrow morning.\"  Goal #1 Outcome: Goal met  Short Term Goal # 1: Pt will complete FEES w SLP to further evaluate swallow function and inform POC.  Goal Outcome # 1: Goal met, new goal added  Short Term Goal # 1 B : Pt will consume diet of RG/MT given strategy use with no overt s/sx of aspiration or worsening of lung status.  Short Term Goal # 2: Pt will complet exerises targeting LVC x50 in a session given min cues from SLP  Short Term Goal # 3: Pt will consume sips of TN0 with SLP with no overt s/sx of aspiration.      Juana Chowdhury, SLP  "

## 2024-01-09 NOTE — CARE PLAN
The patient is Watcher - Medium risk of patient condition declining or worsening    Shift Goals  Clinical Goals: anxiety managment, comfortm reassurance  Patient Goals: Breathing  Family Goals: Updates    Progress made toward(s) clinical / shift goals:  PT rests through night, It has multiple episode of anxiety but respond well to PRN medication. PT remains free of injury        Problem: Knowledge Deficit - Standard  Goal: Patient and family/care givers will demonstrate understanding of plan of care, disease process/condition, diagnostic tests and medications  Outcome: Progressing     Problem: Pain - Standard  Goal: Alleviation of pain or a reduction in pain to the patient’s comfort goal  Outcome: Progressing     Problem: Skin Integrity  Goal: Skin integrity is maintained or improved  Outcome: Progressing     Problem: Fall Risk  Goal: Patient will remain free from falls  Outcome: Progressing     Problem: Safety - Medical Restraint  Goal: Remains free of injury from restraints (Restraint for Interference with Medical Device)  Outcome: Progressing  Goal: Free from restraint(s) (Restraint for Interference with Medical Device)  Outcome: Progressing

## 2024-01-09 NOTE — PROCEDURES
Vascular Access Team     Date of Insertion: 01/09/2024  Arm Circumference: 26  Internal length: 37  External Length: 1  Vein Occupancy %: 31   Reason for PICC: Chemo  Labs: WBC 9.5, , BUN 19, Cr 0.63, , INR 1.23     Consents confirmed, vessel patency confirmed with ultrasound. Risks and benefits of procedure explained to patient and education regarding central line associated bloodstream infections provided. Questions answered.      PICC placed in RUE per licensed provider order with ultrasound guidance.  5Fr, 2 lumen PICC placed in Basilic vein after 1 attempt(s). 2 mL of 1% lidocaine injected intradermally at the insertion site. A 21 gauge microintroducer needle was visualized entering the vein and modified Seldinger technique was used to obtain access to the vein. 37 cm catheter inserted and brisk blood return was observed from each lumen upon aspiration. Line secured at the 1 cm marker. TCS stylet removed and observed to be fully intact. Each lumen flushed using pulsatile method without resistance with 10 mL 0.9% normal saline. PICC line secured with Biopatch and Tegaderm.     PICC tip placement location is confirmed by nurse to be in the Superior Vena Cava (SVC) utilizing 3CG technology. PICC line is appropriate for use at this time. Patient tolerated procedure well, without complications.  Patient condition relayed to primary RN or ordering physician via this post procedure note in the EMR.      Ultrasound images uploaded to PACS and viewable in the EMR - yes  Ultrasound imaged printed and placed in paper chart - no     BARD Power PICC ref # [id number], Lot # [id number], Expiration Date 07/31/2024

## 2024-01-09 NOTE — CARE PLAN
The patient is Stable - Low risk of patient condition declining or worsening    Shift Goals  Clinical Goals: Anxiety management, Fees Exam, PICC Placement, Schedule PET Exam  Patient Goals: Comfort, Breathing, Advance Diet  Family Goals: Updates, procedures to help with diagnosis    Patient provided Kardex educational material regarding PET scan and Hodgkin's Lymphoma diagnosis     Progress made toward(s) clinical / shift goals:    Problem: Knowledge Deficit - Standard  Goal: Patient and family/care givers will demonstrate understanding of plan of care, disease process/condition, diagnostic tests and medications  1/9/2024 1323 by Meredith Kumar R.N.  Outcome: Progressing  1/9/2024 1322 by NORMAN MatiasN.  Outcome: Progressing     Problem: Pain - Standard  Goal: Alleviation of pain or a reduction in pain to the patient’s comfort goal  1/9/2024 1323 by Meredith Kumar R.N.  Outcome: Progressing  1/9/2024 1322 by NORMAN MatiasN.  Outcome: Progressing     Problem: Skin Integrity  Goal: Skin integrity is maintained or improved  1/9/2024 1323 by NORMAN MatiasN.  Outcome: Progressing  1/9/2024 1322 by Meredith Kumar R.N.  Outcome: Progressing     Problem: Fall Risk  Goal: Patient will remain free from falls  1/9/2024 1323 by NORMAN MatiasN.  Outcome: Progressing  1/9/2024 1322 by Meredith Kumar R.N.  Outcome: Progressing     Problem: Psychosocial  Goal: Patient's level of anxiety will decrease  Outcome: Progressing  Goal: Patient's ability to verbalize feelings about condition will improve  Outcome: Progressing  Goal: Patient and family will demonstrate ability to cope with life altering diagnosis and/or procedure  Outcome: Progressing     Problem: Risk for Aspiration  Goal: Patient's risk for aspiration will be absent or decrease  Outcome: Progressing

## 2024-01-09 NOTE — PROGRESS NOTES
Med rec completed per pt.   Allergies reviewed with pt.   Denies taking any home medications except for albuterol inhaler, using consistently.   Confirms not taking fluoxetine, buspirone, lamotrigine, or propranolol.   Meredith Mendoza, Pharmacy Intern

## 2024-01-09 NOTE — ASSESSMENT & PLAN NOTE
RASS goals -1 to +1  I am actively titrating Precedex for RASS goals  Xanax and haldol as needed  Resume home Buspar 15mg PO BID  Resume home Prozac 20mg PO daily

## 2024-01-09 NOTE — DIETARY
Nutrition Services: Update   Day 5 of admit.  Sol Jones is a 33 y.o. female with admitting DX of Acute respiratory failure with hypoxia.    Pt extubated 1/8 and tube feed order was discontinued. Diet started today per SLP. PO intake of <25% recorded this morning. Pt currently receiving diet for PET scan and mildly thick liquids. RD to monitor for adequacy of PO intake.    Recommendations/Plan:  Diet per SLP.   Encourage intake of meals >50%.  Document intake of all meals as % taken in ADL's to provide interdisciplinary communication across all shifts.   Monitor weight.  Nutrition rep will continue to see patient for ongoing meal and snack preferences.  Obtain supplement order per RD as needed.    RD following

## 2024-01-09 NOTE — CARE PLAN
Problem: Bronchoconstriction  Goal: Improve in air movement and diminished wheezing  Description: Target End Date:  2 to 3 days    1.  Implement inhaled treatments  2.  Evaluate and manage medication effects  Outcome: Progressing  Duoneb q4

## 2024-01-09 NOTE — PROGRESS NOTES
Pathology consistent with Hodgkin's Lymphoma, Nodular Sclerosing type.    I went to discuss with patient; however, had recently received Xanax and unable to participate in conversations.     Regarding staging, patient has bulky mediastinal disease.  ESR has not been done.  Imaging shows enlarged L and R cervical LN, and L supraclav node.  Right paratracheal mass measuring 5.0 x 5.9 cm. Multi station mediastinal lymphadenopathy. Pericardial lymphadenopathy.     Unfortunately, unable to conduct a PET scan, as she is inpatient.      Most recent preg test negative.     Plan  -PET to complete staging (absolutely necessary for staging)  -to discuss fertility preservation (likely not possible given the acuity of her case)  -Blood work, CBC, CMP, LDH, ESR, HIV, hepatitis panel  -Echo  -port placement ASAP or PICC line  -Would ideally proceed with BV AVD (if stage III or IV) based on [place]-1 trial vs ABVD x2 cycles.     Potential regimen      ABVD Regimen  28 day cycles for 2 cycles  Doxorubicin 25mg/m2 IV pushs on days 1 and 15  Bleomycin 10units/m2 IV over 10 minutes on days 1 through 15  Vinblastine 6ng/m2 IV over 5-10 minutes on days 1 and 15  Dacarbazine 375mg/m2 IV over 30 minutes on days 1 and 15    OR    The A+AVD regimen   1.2 mg of brentuximab vedotin per kilogram of body weight  25 mg of doxorubicin per square meter of body-surface area  6 mg of vinblastine per square meter  375 mg of dacarbazine per square meter      Treatment course precedes additional therapy, based on her subsequent Deauville score after restaging scans    Will discuss treatment with patient tomorrow after above tests are done    Macie Damon MD  Hematology/Oncology

## 2024-01-10 ENCOUNTER — APPOINTMENT (OUTPATIENT)
Dept: RADIOLOGY | Facility: MEDICAL CENTER | Age: 34
DRG: 823 | End: 2024-01-10
Attending: STUDENT IN AN ORGANIZED HEALTH CARE EDUCATION/TRAINING PROGRAM
Payer: MEDICAID

## 2024-01-10 LAB
ALBUMIN SERPL BCP-MCNC: 3 G/DL (ref 3.2–4.9)
ALBUMIN/GLOB SERPL: 1 G/DL
ALP SERPL-CCNC: 90 U/L (ref 30–99)
ALT SERPL-CCNC: 35 U/L (ref 2–50)
ANION GAP SERPL CALC-SCNC: 13 MMOL/L (ref 7–16)
AST SERPL-CCNC: 46 U/L (ref 12–45)
BASOPHILS # BLD AUTO: 0.4 % (ref 0–1.8)
BASOPHILS # BLD: 0.04 K/UL (ref 0–0.12)
BILIRUB SERPL-MCNC: 0.3 MG/DL (ref 0.1–1.5)
BUN SERPL-MCNC: 19 MG/DL (ref 8–22)
CALCIUM ALBUM COR SERPL-MCNC: 9.3 MG/DL (ref 8.5–10.5)
CALCIUM SERPL-MCNC: 8.5 MG/DL (ref 8.5–10.5)
CHLORIDE SERPL-SCNC: 104 MMOL/L (ref 96–112)
CO2 SERPL-SCNC: 22 MMOL/L (ref 20–33)
CREAT SERPL-MCNC: 0.55 MG/DL (ref 0.5–1.4)
EOSINOPHIL # BLD AUTO: 0.1 K/UL (ref 0–0.51)
EOSINOPHIL NFR BLD: 1 % (ref 0–6.9)
ERYTHROCYTE [DISTWIDTH] IN BLOOD BY AUTOMATED COUNT: 45.1 FL (ref 35.9–50)
GFR SERPLBLD CREATININE-BSD FMLA CKD-EPI: 124 ML/MIN/1.73 M 2
GLOBULIN SER CALC-MCNC: 3.1 G/DL (ref 1.9–3.5)
GLUCOSE SERPL-MCNC: 78 MG/DL (ref 65–99)
HCT VFR BLD AUTO: 32.2 % (ref 37–47)
HGB BLD-MCNC: 10.7 G/DL (ref 12–16)
IMM GRANULOCYTES # BLD AUTO: 0.09 K/UL (ref 0–0.11)
IMM GRANULOCYTES NFR BLD AUTO: 0.9 % (ref 0–0.9)
INR PPP: 1.21 (ref 0.87–1.13)
LYMPHOCYTES # BLD AUTO: 0.86 K/UL (ref 1–4.8)
LYMPHOCYTES NFR BLD: 8.3 % (ref 22–41)
MCH RBC QN AUTO: 29.2 PG (ref 27–33)
MCHC RBC AUTO-ENTMCNC: 33.2 G/DL (ref 32.2–35.5)
MCV RBC AUTO: 88 FL (ref 81.4–97.8)
MONOCYTES # BLD AUTO: 1.01 K/UL (ref 0–0.85)
MONOCYTES NFR BLD AUTO: 9.8 % (ref 0–13.4)
NEUTROPHILS # BLD AUTO: 8.25 K/UL (ref 1.82–7.42)
NEUTROPHILS NFR BLD: 79.6 % (ref 44–72)
NRBC # BLD AUTO: 0 K/UL
NRBC BLD-RTO: 0 /100 WBC (ref 0–0.2)
PLATELET # BLD AUTO: 416 K/UL (ref 164–446)
PMV BLD AUTO: 8.8 FL (ref 9–12.9)
POTASSIUM SERPL-SCNC: 3.7 MMOL/L (ref 3.6–5.5)
PROT SERPL-MCNC: 6.1 G/DL (ref 6–8.2)
PROTHROMBIN TIME: 15.4 SEC (ref 12–14.6)
RBC # BLD AUTO: 3.66 M/UL (ref 4.2–5.4)
SODIUM SERPL-SCNC: 139 MMOL/L (ref 135–145)
WBC # BLD AUTO: 10.4 K/UL (ref 4.8–10.8)

## 2024-01-10 PROCEDURE — 770000 HCHG ROOM/CARE - INTERMEDIATE ICU *

## 2024-01-10 PROCEDURE — 94640 AIRWAY INHALATION TREATMENT: CPT

## 2024-01-10 PROCEDURE — 99497 ADVNCD CARE PLAN 30 MIN: CPT | Performed by: HOSPITALIST

## 2024-01-10 PROCEDURE — 85610 PROTHROMBIN TIME: CPT

## 2024-01-10 PROCEDURE — 700111 HCHG RX REV CODE 636 W/ 250 OVERRIDE (IP): Performed by: HOSPITALIST

## 2024-01-10 PROCEDURE — 80053 COMPREHEN METABOLIC PANEL: CPT

## 2024-01-10 PROCEDURE — A9552 F18 FDG: HCPCS

## 2024-01-10 PROCEDURE — 700102 HCHG RX REV CODE 250 W/ 637 OVERRIDE(OP): Performed by: INTERNAL MEDICINE

## 2024-01-10 PROCEDURE — A9270 NON-COVERED ITEM OR SERVICE: HCPCS | Performed by: INTERNAL MEDICINE

## 2024-01-10 PROCEDURE — 700105 HCHG RX REV CODE 258: Performed by: EMERGENCY MEDICINE

## 2024-01-10 PROCEDURE — 700111 HCHG RX REV CODE 636 W/ 250 OVERRIDE (IP): Mod: JZ | Performed by: INTERNAL MEDICINE

## 2024-01-10 PROCEDURE — 99233 SBSQ HOSP IP/OBS HIGH 50: CPT | Performed by: HOSPITALIST

## 2024-01-10 PROCEDURE — 94664 DEMO&/EVAL PT USE INHALER: CPT

## 2024-01-10 PROCEDURE — 700111 HCHG RX REV CODE 636 W/ 250 OVERRIDE (IP): Performed by: STUDENT IN AN ORGANIZED HEALTH CARE EDUCATION/TRAINING PROGRAM

## 2024-01-10 PROCEDURE — 700101 HCHG RX REV CODE 250: Performed by: EMERGENCY MEDICINE

## 2024-01-10 PROCEDURE — 85025 COMPLETE CBC W/AUTO DIFF WBC: CPT

## 2024-01-10 PROCEDURE — 700102 HCHG RX REV CODE 250 W/ 637 OVERRIDE(OP): Performed by: STUDENT IN AN ORGANIZED HEALTH CARE EDUCATION/TRAINING PROGRAM

## 2024-01-10 PROCEDURE — A9270 NON-COVERED ITEM OR SERVICE: HCPCS | Performed by: STUDENT IN AN ORGANIZED HEALTH CARE EDUCATION/TRAINING PROGRAM

## 2024-01-10 PROCEDURE — 99232 SBSQ HOSP IP/OBS MODERATE 35: CPT | Performed by: STUDENT IN AN ORGANIZED HEALTH CARE EDUCATION/TRAINING PROGRAM

## 2024-01-10 RX ORDER — 0.9 % SODIUM CHLORIDE 0.9 %
10 VIAL (ML) INJECTION PRN
OUTPATIENT
Start: 2024-01-29

## 2024-01-10 RX ORDER — 0.9 % SODIUM CHLORIDE 0.9 %
VIAL (ML) INJECTION PRN
OUTPATIENT
Start: 2024-01-10

## 2024-01-10 RX ORDER — PROCHLORPERAZINE MALEATE 10 MG
10 TABLET ORAL EVERY 6 HOURS PRN
OUTPATIENT
Start: 2024-02-12

## 2024-01-10 RX ORDER — EPINEPHRINE 1 MG/ML(1)
0.5 AMPUL (ML) INJECTION PRN
OUTPATIENT
Start: 2024-01-29

## 2024-01-10 RX ORDER — 0.9 % SODIUM CHLORIDE 0.9 %
VIAL (ML) INJECTION PRN
OUTPATIENT
Start: 2024-02-12

## 2024-01-10 RX ORDER — DIPHENHYDRAMINE HYDROCHLORIDE 50 MG/ML
50 INJECTION INTRAMUSCULAR; INTRAVENOUS PRN
OUTPATIENT
Start: 2024-02-12

## 2024-01-10 RX ORDER — DIPHENHYDRAMINE HYDROCHLORIDE 50 MG/ML
50 INJECTION INTRAMUSCULAR; INTRAVENOUS PRN
OUTPATIENT
Start: 2024-01-29

## 2024-01-10 RX ORDER — METHYLPREDNISOLONE SODIUM SUCCINATE 125 MG/2ML
125 INJECTION, POWDER, LYOPHILIZED, FOR SOLUTION INTRAMUSCULAR; INTRAVENOUS PRN
OUTPATIENT
Start: 2024-01-29

## 2024-01-10 RX ORDER — 0.9 % SODIUM CHLORIDE 0.9 %
10 VIAL (ML) INJECTION PRN
OUTPATIENT
Start: 2024-02-12

## 2024-01-10 RX ORDER — METHYLPREDNISOLONE SODIUM SUCCINATE 125 MG/2ML
125 INJECTION, POWDER, LYOPHILIZED, FOR SOLUTION INTRAMUSCULAR; INTRAVENOUS PRN
OUTPATIENT
Start: 2024-02-12

## 2024-01-10 RX ORDER — SODIUM CHLORIDE 9 MG/ML
INJECTION, SOLUTION INTRAVENOUS CONTINUOUS
OUTPATIENT
Start: 2024-02-12

## 2024-01-10 RX ORDER — EPINEPHRINE 1 MG/ML(1)
0.5 AMPUL (ML) INJECTION PRN
OUTPATIENT
Start: 2024-02-12

## 2024-01-10 RX ORDER — ACETAMINOPHEN 325 MG/1
650 TABLET ORAL ONCE
OUTPATIENT
Start: 2024-02-12 | End: 2024-01-25

## 2024-01-10 RX ORDER — 0.9 % SODIUM CHLORIDE 0.9 %
3 VIAL (ML) INJECTION PRN
OUTPATIENT
Start: 2024-02-12

## 2024-01-10 RX ORDER — ONDANSETRON 8 MG/1
8 TABLET, ORALLY DISINTEGRATING ORAL PRN
OUTPATIENT
Start: 2024-02-12

## 2024-01-10 RX ORDER — 0.9 % SODIUM CHLORIDE 0.9 %
3 VIAL (ML) INJECTION PRN
OUTPATIENT
Start: 2024-01-29

## 2024-01-10 RX ORDER — ONDANSETRON 8 MG/1
8 TABLET, ORALLY DISINTEGRATING ORAL PRN
OUTPATIENT
Start: 2024-01-29

## 2024-01-10 RX ORDER — ACETAMINOPHEN 325 MG/1
650 TABLET ORAL ONCE
OUTPATIENT
Start: 2024-01-29 | End: 2024-01-11

## 2024-01-10 RX ORDER — 0.9 % SODIUM CHLORIDE 0.9 %
3 VIAL (ML) INJECTION PRN
OUTPATIENT
Start: 2024-01-10

## 2024-01-10 RX ORDER — 0.9 % SODIUM CHLORIDE 0.9 %
VIAL (ML) INJECTION PRN
OUTPATIENT
Start: 2024-01-29

## 2024-01-10 RX ORDER — 0.9 % SODIUM CHLORIDE 0.9 %
10 VIAL (ML) INJECTION PRN
OUTPATIENT
Start: 2024-01-10

## 2024-01-10 RX ORDER — ONDANSETRON 2 MG/ML
4 INJECTION INTRAMUSCULAR; INTRAVENOUS PRN
OUTPATIENT
Start: 2024-01-29

## 2024-01-10 RX ORDER — POTASSIUM CHLORIDE 14.9 MG/ML
20 INJECTION INTRAVENOUS ONCE
Status: COMPLETED | OUTPATIENT
Start: 2024-01-10 | End: 2024-01-10

## 2024-01-10 RX ORDER — PROCHLORPERAZINE MALEATE 10 MG
10 TABLET ORAL EVERY 6 HOURS PRN
OUTPATIENT
Start: 2024-01-29

## 2024-01-10 RX ORDER — ONDANSETRON 2 MG/ML
4 INJECTION INTRAMUSCULAR; INTRAVENOUS PRN
OUTPATIENT
Start: 2024-02-12

## 2024-01-10 RX ORDER — SODIUM CHLORIDE 9 MG/ML
INJECTION, SOLUTION INTRAVENOUS CONTINUOUS
OUTPATIENT
Start: 2024-01-29

## 2024-01-10 RX ADMIN — DIAZEPAM 5 MG: 10 INJECTION, SOLUTION INTRAMUSCULAR; INTRAVENOUS at 02:09

## 2024-01-10 RX ADMIN — ENOXAPARIN SODIUM 40 MG: 100 INJECTION SUBCUTANEOUS at 17:33

## 2024-01-10 RX ADMIN — DIAZEPAM 5 MG: 10 INJECTION, SOLUTION INTRAMUSCULAR; INTRAVENOUS at 07:34

## 2024-01-10 RX ADMIN — DEXMEDETOMIDINE 0.4 MCG/KG/HR: 100 INJECTION, SOLUTION INTRAVENOUS at 22:29

## 2024-01-10 RX ADMIN — ALBUTEROL SULFATE 2 PUFF: 90 AEROSOL, METERED RESPIRATORY (INHALATION) at 17:07

## 2024-01-10 RX ADMIN — BUSPIRONE HYDROCHLORIDE 15 MG: 10 TABLET ORAL at 17:08

## 2024-01-10 RX ADMIN — DEXMEDETOMIDINE 0.2 MCG/KG/HR: 100 INJECTION, SOLUTION INTRAVENOUS at 10:27

## 2024-01-10 RX ADMIN — BUSPIRONE HYDROCHLORIDE 15 MG: 10 TABLET ORAL at 05:59

## 2024-01-10 RX ADMIN — ALBUTEROL SULFATE 2 PUFF: 90 AEROSOL, METERED RESPIRATORY (INHALATION) at 09:42

## 2024-01-10 RX ADMIN — DEXMEDETOMIDINE 0.2 MCG/KG/HR: 100 INJECTION, SOLUTION INTRAVENOUS at 10:04

## 2024-01-10 RX ADMIN — DIAZEPAM 5 MG: 10 INJECTION, SOLUTION INTRAMUSCULAR; INTRAVENOUS at 21:50

## 2024-01-10 RX ADMIN — POTASSIUM CHLORIDE 20 MEQ: 14.9 INJECTION, SOLUTION INTRAVENOUS at 08:14

## 2024-01-10 RX ADMIN — FLUOXETINE HYDROCHLORIDE 20 MG: 20 SOLUTION ORAL at 05:59

## 2024-01-10 RX ADMIN — DIAZEPAM 5 MG: 10 INJECTION, SOLUTION INTRAMUSCULAR; INTRAVENOUS at 11:44

## 2024-01-10 ASSESSMENT — PAIN DESCRIPTION - PAIN TYPE
TYPE: ACUTE PAIN

## 2024-01-10 ASSESSMENT — ENCOUNTER SYMPTOMS
FEVER: 0
WHEEZING: 1
MEMORY LOSS: 0
VOMITING: 0
WEIGHT LOSS: 0
TINGLING: 0
ORTHOPNEA: 0
HEARTBURN: 0
DEPRESSION: 0
TREMORS: 0
ABDOMINAL PAIN: 0
DIZZINESS: 0
CHILLS: 0
NECK PAIN: 0
SENSORY CHANGE: 0
SORE THROAT: 0
FOCAL WEAKNESS: 0
PALPITATIONS: 0
BLURRED VISION: 0
SPUTUM PRODUCTION: 0
BRUISES/BLEEDS EASILY: 0
SHORTNESS OF BREATH: 1
NAUSEA: 0
HEADACHES: 0
COUGH: 1

## 2024-01-10 NOTE — ASSESSMENT & PLAN NOTE
S/p bx with hodgkins lymphoma diagnosed  Patient with bulky mediastinal adenopathy/mass  1/10 PET scan I reviewed with the mother of the patient.  Results reviewed

## 2024-01-10 NOTE — CONSULTS
Spoke with patient's nurse who reported that due to the patient just recently have a PET scan there is an instruction for the patient not to have visitors before  0700 tomorrow due to concerns for radiation. Psychiatry will plan to see the patient tomorrow for the consult assessment.
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
## 2024-01-10 NOTE — CONSULTS
Hospital Medicine Consultation    Date of Service  1/9/2024    Referring Physician  Zoe Werner M.D.    Consulting Physician  Walter Gonzalez D.O.    Reason for Consultation  Increasing shortness of breath    History of Presenting Illness  Sol Jones is a 33 y.o. female w/ a hx of asthma who presented 1/4/2024 with acute respiratory distress and found on imaging to have an enlarged mediastinum and on exam notable adenopathy.  She required intubation due to worsening respiratory status and had a CT scan showing a large mediastinal mass, cervical adenopathy, and axillary adenopathy.  A biopsy was obtained and positive for Hodgkins lymphoma. SHe was extubated 1/8.      1/9:  Patient alert and aware of her diagnosis.  Received a picc line right UE.  Ongoing dry cough.    Review of Systems  Review of Systems   Constitutional:  Negative for malaise/fatigue and weight loss.   HENT:  Negative for sore throat.    Respiratory:  Positive for cough and shortness of breath. Negative for sputum production and stridor.    Cardiovascular:  Negative for chest pain, palpitations and leg swelling.   Gastrointestinal:  Negative for abdominal pain, diarrhea and nausea.   Musculoskeletal:  Negative for back pain and joint pain.   Skin:  Negative for rash.   Neurological:  Negative for dizziness, speech change and headaches.   Psychiatric/Behavioral:  Negative for depression. The patient is not nervous/anxious.        Past Medical History   has a past medical history of ASTHMA, Ovarian cyst, and Psychiatric disorder.    Surgical History   has a past surgical history that includes gyn surgery; other; and node biopsy (Left, 1/5/2024).    Family History  family history includes Hypertension in her mother.    Social History   reports that she has been smoking cigarettes. She has a 2.5 pack-year smoking history. She has never used smokeless tobacco. She reports that she does not currently use alcohol. She reports that she does not  currently use drugs after having used the following drugs: Inhaled.    Medications  Current Facility-Administered Medications   Medication Dose Route Frequency Provider Last Rate Last Admin    ipratropium-albuterol (DUONEB) nebulizer solution  3 mL Nebulization Q4H PRN (RT) Randal Maxwell M.D.   3 mL at 01/09/24 1946    ALPRAZolam (Xanax) tablet 0.25 mg  0.25 mg Oral BID PRN Zoe Werner M.D.   0.25 mg at 01/09/24 2026    haloperidol lactate (Haldol) injection 5 mg  5 mg Intravenous Q6HRS PRN Zoe Werner M.D.   5 mg at 01/09/24 1957    FLUoxetine (PROzac) 20 MG/5ML solution 20 mg  20 mg Oral DAILY Zoe Werner M.D.   20 mg at 01/09/24 0954    busPIRone (Buspar) tablet 15 mg  15 mg Oral BID Zoe Werner M.D.   15 mg at 01/09/24 2026    dextrose 10 % BOLUS 25 g  25 g Intravenous Q15 MIN PRN Zoe Werner M.D.        ondansetron (Zofran) syringe/vial injection 4 mg  4 mg Intravenous Q6HRS PRN Walter Gonzalez DANN MARIE   4 mg at 01/09/24 1504    senna-docusate (Pericolace Or Senokot S) 8.6-50 MG per tablet 2 Tablet  2 Tablet Enteral Tube BID Murray Morris M.D.   2 Tablet at 01/08/24 0531    And    polyethylene glycol/lytes (Miralax) Packet 1 Packet  1 Packet Enteral Tube QDAY PRN Murray Morris M.D.        And    magnesium hydroxide (Milk Of Magnesia) suspension 30 mL  30 mL Enteral Tube QDAY PRN Murray Morris M.D.        And    bisacodyl (Dulcolax) suppository 10 mg  10 mg Rectal QDAY PRN Murray Morris M.D.        dexmedetomidine (PRECEDEX) 400 mcg/100mL NS premix infusion  0.1-1.5 mcg/kg/hr (Ideal) Intravenous Continuous Murray Morris M.D. 2.7 mL/hr at 01/09/24 1958 0.2 mcg/kg/hr at 01/09/24 1958    enoxaparin (Lovenox) inj 40 mg  40 mg Subcutaneous DAILY AT 1800 Philip Hebert M.D.   40 mg at 01/09/24 1803    Respiratory Therapy Consult   Nebulization Continuous RT Murray Morris M.D.        MD Alert...ICU Electrolyte Replacement per Pharmacy   Other PHARMACY TO DOSE  Murray Morris M.D.           Allergies  No Known Allergies    Physical Exam  Temp:  [37.1 °C (98.7 °F)] 37.1 °C (98.7 °F)  Pulse:  [60-86] 75  Resp:  [13-44] 18  BP: ()/(55-78) 122/72  SpO2:  [91 %-100 %] 100 %    Physical Exam  Vitals reviewed.   Constitutional:       Appearance: Normal appearance. She is not diaphoretic.   HENT:      Head: Normocephalic and atraumatic.      Nose: Nose normal.      Mouth/Throat:      Mouth: Mucous membranes are moist.      Pharynx: No oropharyngeal exudate.   Eyes:      General: No scleral icterus.        Right eye: No discharge.         Left eye: No discharge.      Extraocular Movements: Extraocular movements intact.      Conjunctiva/sclera: Conjunctivae normal.   Cardiovascular:      Rate and Rhythm: Normal rate and regular rhythm.      Pulses:           Radial pulses are 2+ on the right side and 2+ on the left side.        Dorsalis pedis pulses are 2+ on the right side and 2+ on the left side.      Heart sounds: No murmur heard.  Pulmonary:      Effort: Pulmonary effort is normal. No respiratory distress.      Breath sounds: Normal breath sounds. No wheezing or rales.   Abdominal:      General: Bowel sounds are normal. There is no distension.      Palpations: Abdomen is soft.      Tenderness: There is no abdominal tenderness.   Musculoskeletal:         General: No swelling or tenderness.      Cervical back: No muscular tenderness.      Right lower leg: No edema.      Left lower leg: No edema.   Lymphadenopathy:      Cervical: Cervical adenopathy present.   Skin:     Coloration: Skin is not jaundiced or pale.   Neurological:      General: No focal deficit present.      Mental Status: She is alert and oriented to person, place, and time. Mental status is at baseline.      Cranial Nerves: No cranial nerve deficit.   Psychiatric:         Mood and Affect: Mood is depressed.         Behavior: Behavior normal.         Fluids  Date 01/09/24 0700 - 01/10/24 0659   Shift  7116-1628 4256-8412 5883-1630 24 Hour Total   INTAKE   P.O. 240 60  300   I.V. 94.5   94.5   Shift Total 334.5 60  394.5   OUTPUT   Urine 240 50  290   Shift Total 240 50  290   Weight (kg) 63.6 63.6 63.6 63.6       Laboratory  Recent Labs     01/07/24 0429 01/08/24  0503 01/09/24  0436   WBC 12.6* 11.1* 9.5   RBC 3.47* 3.70* 3.49*   HEMOGLOBIN 10.3* 11.0* 10.3*   HEMATOCRIT 32.6* 33.3* 31.2*   MCV 93.9 90.0 89.4   MCH 29.7 29.7 29.5   MCHC 31.6* 33.0 33.0   RDW 50.4* 47.9 47.3   PLATELETCT 391 430 403   MPV 8.5* 8.7* 8.6*     Recent Labs     01/07/24 0429 01/08/24  0503 01/09/24  0436   SODIUM 142 146* 139   POTASSIUM 4.3 3.8 3.5*   CHLORIDE 107 109 104   CO2 25 25 24   GLUCOSE 124* 113* 91   BUN 9 17 19   CREATININE 0.46* 0.66 0.63   CALCIUM 8.2* 8.3* 8.4*     Recent Labs     01/07/24 0429 01/08/24  0503 01/09/24  0436   INR 1.15* 1.23* 1.23*          Recent Labs     01/07/24  0429   TRIGLYCERIDE 82        Imaging  IR-PICC LINE PLACEMENT W/ GUIDANCE > AGE 5   Final Result                  Ultrasound-guided PICC placement performed by qualified nursing staff as    above.          DX-CHEST-PORTABLE (1 VIEW)   Final Result         1.  Hazy bilateral lower lobe infiltrates.   2.  Superior mediastinal fullness, compatible with mediastinal masses, visible on CT January 5, 2024      DX-ABDOMEN FOR TUBE PLACEMENT   Final Result      Nasogastric tube terminates in the gastric fundus.      DX-CHEST-PORTABLE (1 VIEW)   Final Result         1. Low ETT, 2 cm above the agustin.   2. No acute process.      CT-HEAD W/O   Final Result         NO ACUTE ABNORMALITIES ARE NOTED ON CT SCAN OF THE HEAD.               DX-CHEST-PORTABLE (1 VIEW)   Final Result      No acute process.      CT-CTA CHEST PULMONARY ARTERY W/ RECONS   Final Result         1.  No definite evidence of pulmonary embolism.   2.  Mild mixed change in presumed multifocal pneumonia.   3.  Extensive lymphadenopathy likely lymphoproliferative disorder such as  lymphoma..      Fleischner Society pulmonary nodule recommendations:         EC-ECHOCARDIOGRAM COMPLETE W/O CONT   Final Result      US-EXTREMITY VENOUS LOWER BILAT   Final Result      DX-CHEST-PORTABLE (1 VIEW)   Final Result         1. No significant interval change.      DX-CHEST-PORTABLE (1 VIEW)   Final Result      1.  Interstitial prominence, new from prior exam.   2.  Multifocal mediastinal prominence as evaluated on CT chest.   3.  Support apparatus as above.      CT-PETCT-WHOLE BODY    (Results Pending)       Assessment/Plan  * Acute respiratory failure with hypoxia (HCC)- (present on admission)  Assessment & Plan  S/p intubation and later extubated 1/8  Encourage deep slow inspiratory efforts as able  Mediastinal adenopathy causing some of the issue    Hodgkin's lymphoma (HCC)  Assessment & Plan  Oncology consulting  PET scan pending 1/10  Plan for chemo discussed per oncology    Hypokalemia- (present on admission)  Assessment & Plan  Monitor and replete as needed    Normochromic normocytic anemia- (present on admission)  Assessment & Plan  Due to chronic disease  Monitor cbc in face of future chemo    Lymphadenopathy- (present on admission)  Assessment & Plan  S/p bx with hodgkins lymphoma diagnosed    Asthma- (present on admission)  Assessment & Plan  No current exacerbation but rather mediastinal adenopathy likely impinging on airway    Anxiety- (present on admission)  Assessment & Plan  Xanax, buspar and Prozac

## 2024-01-10 NOTE — CARE PLAN
The patient is Watcher - Medium risk of patient condition declining or worsening    Shift Goals  Clinical Goals: Behavioral compliance with instructions and plan of care, manage anxiety, PET Scan, transfer to [facility]  Patient Goals: comfort, anxiety management  Family Goals: updates, manage anxiety, comfort    Progress made toward(s) clinical / shift goals:    Problem: Knowledge Deficit - Standard  Goal: Patient and family/care givers will demonstrate understanding of plan of care, disease process/condition, diagnostic tests and medications  Outcome: Progressing     Problem: Pain - Standard  Goal: Alleviation of pain or a reduction in pain to the patient’s comfort goal  Outcome: Progressing     Problem: Skin Integrity  Goal: Skin integrity is maintained or improved  Outcome: Progressing     Problem: Fall Risk  Goal: Patient will remain free from falls  Outcome: Progressing     Problem: Psychosocial  Goal: Patient's level of anxiety will decrease  Outcome: Progressing  Goal: Patient's ability to verbalize feelings about condition will improve  Outcome: Progressing  Goal: Patient and family will demonstrate ability to cope with life altering diagnosis and/or procedure  Outcome: Progressing     Problem: Risk for Aspiration  Goal: Patient's risk for aspiration will be absent or decrease  Outcome: Progressing

## 2024-01-10 NOTE — THERAPY
Speech Language Therapy Contact Note    Patient Name: Sol Jones  Age:  33 y.o., Sex:  female  Medical Record #: [id number]  Today's Date: 1/10/2024       01/10/24 0734   Treatment Variance   Reason For Missed Therapy Medical - Patient  in Procedure   Interdisciplinary Plan of Care Collaboration   IDT Collaboration with  Other (See Comments)  (EMR)   Collaboration Comments PET scan planned for noon this date. Will follow for ongoing dysphagia managment as appropriate as time allows following procedure. Please defer to FEES note from 1/9 for recommendations.     - Juana Chowdhury, SANTIAGO-SLP

## 2024-01-10 NOTE — PROGRESS NOTES
Pt increasingly agitated and pulling off monitors and disconnecting IV and climbing out of bed and displaying unsafe behaviors towards self and others. Attempting to calm pt with emotional support as needed. Pt refusing to take PRN xanax and scheduled Buspar but stating she is very anxious and that we are trying to kill her. Pt accusing staff of putting harmful substances into her and causing her to be in the hospital. Disorganized thinking. Won't cooperate with further assessment. Patient then jumped out of bed, ran towards door, and had to be intervened by staff due to unsteady gait. Security called for safety. Patient was assisted back to bed. Precedex restarted at 0.2. Call placed to MD Linsey, pending return call. Security in [place] for further instruction and patient and staff safety.

## 2024-01-10 NOTE — ASSESSMENT & PLAN NOTE
Oncology consulting  PET scan reviewed 1/10  Plan for chemo discussed per oncology with initiation 1/11  Difficulty as patient is wanting to leave AMA.  I had a long discussion with the patient and recommended her to stay.  She is alert and has capacity but has poor decision-making insight.  I have warned her that given her bulky adenopathy it is possible that her respiratory status could worsen and she could have sudden death or severe disability from potentially significant hypoxia.  I am recommending that she stay and initiate treatment per oncology.  I have texted psychiatry and they will be seeing patient first thing in the a.m.

## 2024-01-10 NOTE — ASSESSMENT & PLAN NOTE
S/p intubation and later extubated 1/8  Encourage deep slow inspiratory efforts as able  Mediastinal adenopathy causing issue  Monitor SpO2, vitals

## 2024-01-10 NOTE — PROGRESS NOTES
Pt refused cardiac and blood pressure monitoring at this time. Educated patient on importance; agreed to wear pulse oximeter.     Will readdress and provide follow up education and comfort measures when appropriate.

## 2024-01-10 NOTE — CARE PLAN
The patient is Watcher - Medium risk of patient condition declining or worsening    Shift Goals  Clinical Goals: Behavioral compliance with instruction and plan of care; manage anxiety; hemodynamic stability; adequate oxygenation  Patient Goals: comfort; adequate oxygenation  Family Goals: Updates; patient comfort    Progress made toward(s) clinical / shift goals:      Problem: Knowledge Deficit - Standard  Goal: Patient and family/care givers will demonstrate understanding of plan of care, disease process/condition, diagnostic tests and medications  Outcome: Progressing     Problem: Pain - Standard  Goal: Alleviation of pain or a reduction in pain to the patient’s comfort goal  Outcome: Progressing     Problem: Skin Integrity  Goal: Skin integrity is maintained or improved  Outcome: Progressing     Problem: Fall Risk  Goal: Patient will remain free from falls  Outcome: Progressing     Problem: Psychosocial  Goal: Patient's level of anxiety will decrease  Outcome: Progressing  Goal: Patient's ability to verbalize feelings about condition will improve  Outcome: Progressing  Goal: Patient and family will demonstrate ability to cope with life altering diagnosis and/or procedure  Outcome: Progressing     Problem: Risk for Aspiration  Goal: Patient's risk for aspiration will be absent or decrease  Outcome: Progressing       Patient is not progressing towards the following goals:

## 2024-01-10 NOTE — PROGRESS NOTES
Dr. Stiles at bedside to assess pt. Patient still having frequent disorganized and delusional thoughts. Stated that nursing staff was trying to suffocate her and give harmful medications. Education provided by MD and RN regarding medications and importance thereof.     Received orders for IV valium and IM Geodon PRN. Pt agreeable at this time to try IV valium.

## 2024-01-11 VITALS
OXYGEN SATURATION: 100 % | BODY MASS INDEX: 23.94 KG/M2 | RESPIRATION RATE: 18 BRPM | HEIGHT: 64 IN | TEMPERATURE: 97.8 F | WEIGHT: 140.21 LBS | DIASTOLIC BLOOD PRESSURE: 66 MMHG | HEART RATE: 66 BPM | SYSTOLIC BLOOD PRESSURE: 105 MMHG

## 2024-01-11 PROBLEM — E87.6 HYPOKALEMIA: Status: RESOLVED | Noted: 2024-01-09 | Resolved: 2024-01-11

## 2024-01-11 PROBLEM — J96.01 ACUTE RESPIRATORY FAILURE WITH HYPOXIA (HCC): Status: RESOLVED | Noted: 2024-01-04 | Resolved: 2024-01-11

## 2024-01-11 LAB
ALBUMIN SERPL BCP-MCNC: 3.2 G/DL (ref 3.2–4.9)
ALBUMIN/GLOB SERPL: 1 G/DL
ALP SERPL-CCNC: 99 U/L (ref 30–99)
ALT SERPL-CCNC: 34 U/L (ref 2–50)
ANION GAP SERPL CALC-SCNC: 13 MMOL/L (ref 7–16)
AST SERPL-CCNC: 36 U/L (ref 12–45)
BASOPHILS # BLD AUTO: 0.6 % (ref 0–1.8)
BASOPHILS # BLD: 0.05 K/UL (ref 0–0.12)
BILIRUB SERPL-MCNC: 0.3 MG/DL (ref 0.1–1.5)
BUN SERPL-MCNC: 15 MG/DL (ref 8–22)
CALCIUM ALBUM COR SERPL-MCNC: 9.1 MG/DL (ref 8.5–10.5)
CALCIUM SERPL-MCNC: 8.5 MG/DL (ref 8.5–10.5)
CHLORIDE SERPL-SCNC: 101 MMOL/L (ref 96–112)
CO2 SERPL-SCNC: 22 MMOL/L (ref 20–33)
CREAT SERPL-MCNC: 0.66 MG/DL (ref 0.5–1.4)
EOSINOPHIL # BLD AUTO: 0.23 K/UL (ref 0–0.51)
EOSINOPHIL NFR BLD: 2.9 % (ref 0–6.9)
ERYTHROCYTE [DISTWIDTH] IN BLOOD BY AUTOMATED COUNT: 44.8 FL (ref 35.9–50)
GFR SERPLBLD CREATININE-BSD FMLA CKD-EPI: 119 ML/MIN/1.73 M 2
GLOBULIN SER CALC-MCNC: 3.3 G/DL (ref 1.9–3.5)
GLUCOSE SERPL-MCNC: 85 MG/DL (ref 65–99)
HCT VFR BLD AUTO: 38.2 % (ref 37–47)
HGB BLD-MCNC: 12.7 G/DL (ref 12–16)
IMM GRANULOCYTES # BLD AUTO: 0.08 K/UL (ref 0–0.11)
IMM GRANULOCYTES NFR BLD AUTO: 1 % (ref 0–0.9)
INR PPP: 1.15 (ref 0.87–1.13)
LYMPHOCYTES # BLD AUTO: 0.85 K/UL (ref 1–4.8)
LYMPHOCYTES NFR BLD: 10.8 % (ref 22–41)
MCH RBC QN AUTO: 29.6 PG (ref 27–33)
MCHC RBC AUTO-ENTMCNC: 33.2 G/DL (ref 32.2–35.5)
MCV RBC AUTO: 89 FL (ref 81.4–97.8)
MONOCYTES # BLD AUTO: 0.78 K/UL (ref 0–0.85)
MONOCYTES NFR BLD AUTO: 9.9 % (ref 0–13.4)
NEUTROPHILS # BLD AUTO: 5.9 K/UL (ref 1.82–7.42)
NEUTROPHILS NFR BLD: 74.8 % (ref 44–72)
NRBC # BLD AUTO: 0 K/UL
NRBC BLD-RTO: 0 /100 WBC (ref 0–0.2)
PLATELET # BLD AUTO: 470 K/UL (ref 164–446)
PMV BLD AUTO: 9.2 FL (ref 9–12.9)
POTASSIUM SERPL-SCNC: 3.6 MMOL/L (ref 3.6–5.5)
PROT SERPL-MCNC: 6.5 G/DL (ref 6–8.2)
PROTHROMBIN TIME: 14.9 SEC (ref 12–14.6)
RBC # BLD AUTO: 4.29 M/UL (ref 4.2–5.4)
SODIUM SERPL-SCNC: 136 MMOL/L (ref 135–145)
WBC # BLD AUTO: 7.9 K/UL (ref 4.8–10.8)

## 2024-01-11 PROCEDURE — 85025 COMPLETE CBC W/AUTO DIFF WBC: CPT

## 2024-01-11 PROCEDURE — 80053 COMPREHEN METABOLIC PANEL: CPT

## 2024-01-11 PROCEDURE — 700102 HCHG RX REV CODE 250 W/ 637 OVERRIDE(OP): Mod: JZ | Performed by: HOSPITALIST

## 2024-01-11 PROCEDURE — 99239 HOSP IP/OBS DSCHRG MGMT >30: CPT | Performed by: HOSPITALIST

## 2024-01-11 PROCEDURE — 85610 PROTHROMBIN TIME: CPT

## 2024-01-11 PROCEDURE — 99254 IP/OBS CNSLTJ NEW/EST MOD 60: CPT | Mod: GC | Performed by: PSYCHIATRY & NEUROLOGY

## 2024-01-11 PROCEDURE — 700111 HCHG RX REV CODE 636 W/ 250 OVERRIDE (IP): Performed by: STUDENT IN AN ORGANIZED HEALTH CARE EDUCATION/TRAINING PROGRAM

## 2024-01-11 PROCEDURE — A9270 NON-COVERED ITEM OR SERVICE: HCPCS | Mod: JZ | Performed by: HOSPITALIST

## 2024-01-11 RX ORDER — BUSPIRONE HYDROCHLORIDE 15 MG/1
15 TABLET ORAL 2 TIMES DAILY
Qty: 90 TABLET | Refills: 2 | Status: SHIPPED | OUTPATIENT
Start: 2024-01-11

## 2024-01-11 RX ORDER — POTASSIUM CHLORIDE 20 MEQ/1
60 TABLET, EXTENDED RELEASE ORAL ONCE
Status: COMPLETED | OUTPATIENT
Start: 2024-01-11 | End: 2024-01-11

## 2024-01-11 RX ORDER — ARIPIPRAZOLE 5 MG/1
5 TABLET ORAL DAILY
Qty: 30 TABLET | Refills: 3 | Status: SHIPPED | OUTPATIENT
Start: 2024-01-11

## 2024-01-11 RX ORDER — ARIPIPRAZOLE 5 MG/1
5 TABLET ORAL DAILY
Status: DISCONTINUED | OUTPATIENT
Start: 2024-01-11 | End: 2024-01-11 | Stop reason: HOSPADM

## 2024-01-11 RX ADMIN — DIAZEPAM 5 MG: 10 INJECTION, SOLUTION INTRAMUSCULAR; INTRAVENOUS at 12:03

## 2024-01-11 RX ADMIN — POTASSIUM CHLORIDE 60 MEQ: 1500 TABLET, EXTENDED RELEASE ORAL at 10:04

## 2024-01-11 ASSESSMENT — PAIN DESCRIPTION - PAIN TYPE
TYPE: ACUTE PAIN

## 2024-01-11 ASSESSMENT — ENCOUNTER SYMPTOMS
DEPRESSION: 0
HALLUCINATIONS: 0
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
## 2024-01-11 NOTE — PROGRESS NOTES
Consult Note: Hematology/Oncology       Primary Care:  Lacy Jernigan D.O.    No chief complaint on file.      Subjective:   History of Presenting Illness:  Sol Jones is a 33 y.o. female     Patient presented to the emergency room on January 4, 2024 with acute dyspnea.  Patient reported that she had multiple ER visits for respiratory illnesses and exacerbations.  In the emergency room she was having wheezes tachypnea tripoding.  She was treated with steroids bronchodilators and low-dose ketamine.  Patient developed hypoxemia and worsening respiratory distress and required intubation.  She underwent a CT of her chest and soft tissue of her neck which was concerning for paratracheal mass, lymphadenopathy in her cervical mediastinal and axillary region.    She was transferred to the ICU.      She underwent a biopsy and preliminary results are consistent with HL.     Interval History    Unfortunately patient had a difficult night.  Per nurse she was combative and extremely frustrated by the news that I delivered to her yesterday.  Forms that were left for her or worked up in the trash.  Partners she is had disorganized and delusional thoughts.    Today I came to speak with her regarding her PET scan results    Past Medical History:   Diagnosis Date    ASTHMA     Ovarian cyst     Psychiatric disorder     anxiety        Past Surgical History:   Procedure Laterality Date    NODE BIOPSY Left 1/5/2024    Procedure: BIOPSY, LYMPH NODE;  Surgeon: Philip Hernandez M.D.;  Location: SURGERY [facility];  Service: General    GYN SURGERY      OTHER      L salpingectomy       Social History     Tobacco Use    Smoking status: Some Days     Current packs/day: 0.50     Average packs/day: 0.5 packs/day for 5.0 years (2.5 ttl pk-yrs)     Types: Cigarettes    Smokeless tobacco: Never   Vaping Use    Vaping Use: Former   Substance Use Topics    Alcohol use: Not Currently     Comment: very rare    Drug use: Not  Currently     Types: Inhaled     Comment: marijuana        Family History   Problem Relation Age of Onset    Hypertension Mother        No Known Allergies    Current Facility-Administered Medications   Medication Dose Route Frequency Provider Last Rate Last Admin    haloperidol lactate (Haldol) injection 5 mg  5 mg Intravenous Q6HRS PRN Zoe Werner M.D.   5 mg at 01/09/24 1957    FLUoxetine (PROzac) 20 MG/5ML solution 20 mg  20 mg Oral DAILY Zoe Werner M.D.   20 mg at 01/10/24 0559    busPIRone (Buspar) tablet 15 mg  15 mg Oral BID Zoe Werner M.D.   15 mg at 01/10/24 0559    dextrose 10 % BOLUS 25 g  25 g Intravenous Q15 MIN PRN Zoe Werner M.D.        ondansetron (Zofran) syringe/vial injection 4 mg  4 mg Intravenous Q6HRS PRN Walter Gonzalez D.O.   4 mg at 01/09/24 1504    ziprasidone (Geodon) injection 20 mg  20 mg Intramuscular Once Praveen Stiles M.D.        ziprasidone (Geodon) injection 10 mg  10 mg Intramuscular Q4HRS PRN Praveen Stiles M.D.        diazePAM (Valium) injection 5 mg  5 mg Intravenous Q4HRS PRN Praveen Stiles M.D.   5 mg at 01/10/24 1144    albuterol inhaler 2 Puff  2 Puff Inhalation Q6HRS PRN Praveen Stiles M.D.   2 Puff at 01/10/24 0942    ipratropium-albuterol (DUONEB) nebulizer solution  3 mL Nebulization Q4H PRN (RT) Praveen Stiles M.D.        senna-docusate (Pericolace Or Senokot S) 8.6-50 MG per tablet 2 Tablet  2 Tablet Enteral Tube BID Murray Morris M.D.   2 Tablet at 01/08/24 0531    And    polyethylene glycol/lytes (Miralax) Packet 1 Packet  1 Packet Enteral Tube QDAY PRN Murray Morris M.D.        And    magnesium hydroxide (Milk Of Magnesia) suspension 30 mL  30 mL Enteral Tube QDAY PRN Murray Morris M.D.        And    bisacodyl (Dulcolax) suppository 10 mg  10 mg Rectal QDAY PRN Murray Morris M.D.        dexmedetomidine (PRECEDEX) 400 mcg/100mL NS premix infusion  0.1-1.5 mcg/kg/hr (Ideal) Intravenous Continuous Murray Morris M.D. 4.1  mL/hr at 01/10/24 1625 0.3 mcg/kg/hr at 01/10/24 1625    enoxaparin (Lovenox) inj 40 mg  40 mg Subcutaneous DAILY AT 1800 Philip Hebert M.D.   40 mg at 01/09/24 1803    Respiratory Therapy Consult   Nebulization Continuous RT Murray Morris M.D.        MD Alert...ICU Electrolyte Replacement per Pharmacy   Other PHARMACY TO DOSE Murray Morris M.D.           Review of Systems   Constitutional:  Negative for chills, fever, malaise/fatigue and weight loss.   HENT:  Negative for congestion, ear pain, nosebleeds and sore throat.    Eyes:  Negative for blurred vision.   Respiratory:  Positive for cough, shortness of breath and wheezing. Negative for sputum production.    Cardiovascular:  Positive for chest pain. Negative for palpitations, orthopnea and leg swelling.   Gastrointestinal:  Negative for abdominal pain, heartburn, nausea and vomiting.   Genitourinary:  Negative for dysuria, frequency and urgency.   Musculoskeletal:  Negative for neck pain.   Neurological:  Negative for dizziness, tingling, tremors, sensory change, focal weakness and headaches.   Endo/Heme/Allergies:  Does not bruise/bleed easily.   Psychiatric/Behavioral:  Negative for depression, memory loss and suicidal ideas.         ++ Anxiety   All other systems reviewed and are negative.      Problem list, medications, and allergies reviewed by myself today in Epic.     Objective:     Vitals:    01/10/24 1300 01/10/24 1400 01/10/24 1415 01/10/24 1430   BP: 113/66 125/76 128/66 136/75   Pulse: 60 (!) 58 (!) 44 (!) 46   Resp:       Temp:  37 °C (98.6 °F)     TempSrc:  Temporal     SpO2: 95% 95% 96% 95%   Weight:       Height:           DESC; KARNOFSKY SCALE WITH ECOG EQUIVALENT: 80, Normal activity with effort; some signs or symptoms of disease (ECOG equivalent 1)    DISTRESS LEVEL: mild distress    Physical Exam  Deferred     Labs:   Most recent labs reviewed.  Please see the lab tab of chart review    Imaging:   Most recent images below have  been independently reviewed by me.      PET scan  1.  There are multifocal areas of abnormal uptake involving lower cervical lymphadenopathy, mediastinal and hilar adenopathy, left axillary adenopathy, bilateral anterior cardiophrenic adenopathy, and bilateral retrocrural lymphadenopathy. Findings are   consistent with Hodgkin's lymphoma. Deauville score 5  2.  Normal size spleen with no abnormal uptake in the spleen.  3.  Small dependent right greater than left pleural effusion with no abnormal uptake.  4.  Patchy lower lobe groundglass opacities probably due to atelectasis with pneumonitis not excluded.    Pathology:  A. Left axillary lymph node:          Classic Hodgkin lymphoma, early nodular sclerosis subtype.   Assessment/Plan:     Ms. Jones is a 34 yo F who presents with pathology consistent with classical Hodgkin's Lymphoma, Nodular Sclerosing subtype.     I had a discussion with the patient and her sister regarding the results of the PET scan which demonstrates that she has cervical, mediastinal, hilar, left axillary, cardiophrenic, retrocrural LAD.  She has bulky mediastinal disease, given that her mass was roughly 6 cm.  Her ESR was >50.  She has more than 3 sites of disease.      I discussed that she is therefore stage Iia unfavorable risk.  We discussed the treatment options going forward which would consist of ABVD.    Patient was not prepared to discuss this and very apprehensive re: treatment.  She wants to go home and come back.    Myself, her Sister Kayla, and her nurse explained to her that treatment is to be initiated soon given that she was intubated for her lymphadenopathy and hypoxia.  This could happen again at home.  We also discussed that she has a PICC line which would have to be pulled before she was able to go home.  Logistics of getting at the clinic into the infusion center was delayed her treatment.  We are all set for her to start treatment tomorrow inpatient.  She would like time  to think about this.    The survival rate of Hodgkin's lymphoma is quite high and we need to initiate treatment soon.     Plan  -Patient to decide if she wants treatment and patient, which is absolutely recommended, but as I explained to the patient I cannot force her to treatment and we will respect her decision to go home if that is the decision that she makes.  -If she chooses to stay we will initiate ABVD tomorrow morning, I have let the pharmacist know.     Potential regimen PENDING PATIENTS CONSENT     ABVD Regimen  28 day cycles for 2 cycles  Doxorubicin 25mg/m2 IV pushs on days 1 and 15  Bleomycin 10units/m2 IV over 10 minutes on days 1 through 15  Vinblastine 6ng/m2 IV over 5-10 minutes on days 1 and 15  Dacarbazine 375mg/m2 IV over 30 minutes on days 1 and 15       Treatment course precedes additional therapy, based on her subsequent Deauville score after restaging scans     Will discuss treatment with patient tomorrow after above tests are done     Macie Damon MD  Hematology/Oncology

## 2024-01-11 NOTE — PROGRESS NOTES
"Dr Damon came to the bedside earlier to discuss cancer diagnosis, staging and plan of care, with chemo treatment to start as early as tomorrow if patient agrees. Pt stating she does not want to start chemo tomorrow and would like to \"go home for a couple of days\" and that she will \"come back for treatment at a later date\". MD discussed risks if she was to leave AMA.     Dr Gonzalez was notified of this situation by phone and then arrived to bedside at 1720 to assess patient, her competency and discuss risks/benefits with patient. Pt still expressing her desire to leave. Psychiatry consult/evaluation pending tomorrow am.     "
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
## 2024-01-11 NOTE — PROGRESS NOTES
"At 1645 [facility] I was called to the patient's bedside with her wanting to leave AGAINST MEDICAL ADVICE.  Her mother was at bedside.  The patient is on low-dose Precedex drip.  She was alert at being at [facility] in [place] in the year.  She is aware that she has Hodgkin's lymphoma.  She was aware that we are recommending that she stay and initiate treatment with chemotherapy.  The patient was not exactly clear on why she wanted to leave she had some \"personal issues\" that she wanted to handle expressed to her that there is nothing more important than getting this Hodgkin's lymphoma treatment initiated.  I also made her aware that given her large mediastinal mass that is possible that things could acutely get worse her respiratory status could worsen and she could have further harm to her life or death.  The patient did not believe that it was going to get worse.  Patient denies any suicidal ideations.  Her mother was at bedside also wishing that she would stay.  I asked her if she was scared and she denied anything that was overtly making her leave the hospital other than some personal issues.  I had a discussion that psychiatry would be back early in the a.m. to help evaluate and adjust her medications.  Patient seemed pleased with this.  I did ask if there is any further medications that could help her with her any anxiety component but she denied any issues.  This point in time the patient has capacity.  I have recommended that she stay to continue chemotherapy which is supposed be initiated by oncology.  Will also have psychiatry evaluate the patient first thing in the a.m.    30 minutes face-to-face time spent with the patient, mother, ICU RN, and the patient's sister.  "

## 2024-01-11 NOTE — PROGRESS NOTES
1210: spoke with Dr. Damon (oncology) to clarify plan of care. Dr. Damon educated patient at length regarding beginning chemo therapy while inpatient. This is her recommendation. Despite extensive education, patient is requesting to leave AMA and complete chemo outpatient. Dr. Damon is aware of this decision.     1215: updated Dr. Gonzalez about conversation with Dr. Damon and patient's wishes. Dr. Gonzalez to come to bedside shortly to speak with patient.     1315: Dr. Gonzalez bedside to speak with patient and discuss AMA status. Pt confirms she would like to leave AMA.     1445: Patient left against medical advise. PICC line was removed per MD order. Patient independently gathered all personal belongings and left the floor independently.

## 2024-01-11 NOTE — PROGRESS NOTES
"1128: telesitter monitor called NAP stating he could no longer see patient but could hear her arguing with pt's mother.     NAP entered room to find patient bent over behind HOB smoking out of electronic vaping device. Patient's mom states she brought the vape device in her bag and the patient took it \"without her knowing.\" NAP and charge RN educated patient on hospital policy. Patient and mother verbalized understanding. Charge RN requested to remove vape device and store in security. Patient's mom declined and stated she would take the vape device home.    "

## 2024-01-12 ENCOUNTER — TELEPHONE (OUTPATIENT)
Dept: HEMATOLOGY ONCOLOGY | Facility: MEDICAL CENTER | Age: 34
End: 2024-01-12
Payer: MEDICAID

## 2024-01-12 DIAGNOSIS — C81.98 HODGKIN LYMPHOMA OF LYMPH NODES OF MULTIPLE REGIONS, UNSPECIFIED HODGKIN LYMPHOMA TYPE (HCC): ICD-10-CM

## 2024-01-12 NOTE — DISCHARGE SUMMARY
Discharge Summary    CHIEF COMPLAINT ON ADMISSION  No chief complaint on file.      Reason for Admission  Respiratory Failure     Admission Date  1/4/2024    CODE STATUS  FULL    HPI & HOSPITAL COURSE  Sol Jones is a 33 y.o. female w/ a hx of asthma, bipolar, PTSD, and anxiety who presented 1/4/2024 with acute respiratory distress and found on imaging to have an enlarged bulky mediastinum adenopathy and on exam other changes notable adenopathy.  She required intubation due to worsening respiratory status and had a CT scan showing a large mediastinal mass, cervical adenopathy, and axillary adenopathy.  A biopsy was obtained and positive for Hodgkins lymphoma. SHe was extubated 1/8.   A PET scan was performed 1/10 showing multiple focal areas of abnormal uptake involving the lower cervical lymphadenopathy, mediastinal and hilar adenopathy left axillary adenopathy bilateral anterior cardiophrenic adenopathy, bilateral retrocrural lymphadenopathy findings consistent with Hodgkin's lymphoma.  Normal-sized spleen with no abnormal uptake in the spleen.     During the patient's hospitalization she repeatedly stated that she wanted to leave AGAINST MEDICAL ADVICE.  Patient was evaluated with her parents both at bedside on 2 separate occasions.  Patient was alert and oriented x 3 and had capacity.  She understood her current plight with having Hodgkin's lymphoma which is quite highly treatable per oncology.  Oncology had spoken to the patient as well and stated they would like to start immediate treatment however patient again stressed that she wanted to go home first.  Patient was aware that she has bulky mediastinal adenopathy which was causing some impingement and shortness of breath however during her stay she was on room air and in no distress.  Was alerted to her that this potentially could get worse.  She was aware and stated that she would immediately come back if any issues.  She was aware that she is at risk  with potential of acute respiratory failure.  Patient could not explain to me why she wanted to leave other than she had some things to handle.  She denied any suicidal ideation.  Oncology was aware of the patient wanting to leave AGAINST MEDICAL ADVICE and recommended removal of current PICC line and will get the patient set up for an outpatient port implanted.  I have recommended that the patient return to the hospital soon as possible.  I alerted her that she should stay initiation of treatment soon as possible.  Psychiatry did see the patient during hospitalization they have initiated her on Abilify.  I did prescribe the patient her prescriptions of antipsychotics.    Therefore, she is left AGAINST MEDICAL ADVICE on 1/11/2024.  Patient was ambulatory without assist speech was clear.  Her father was at bedside on date of leaving AGAINST MEDICAL ADVICE.    Discharge Date  1/11/2024    FOLLOW UP ITEMS POST DISCHARGE  None    DISCHARGE DIAGNOSES  Principal Problem (Resolved):    Acute respiratory failure with hypoxia (HCC) (POA: Yes)  Active Problems:    Anxiety (POA: Yes)    Asthma (POA: Yes)    Lymphadenopathy (POA: Yes)    Normochromic normocytic anemia (POA: Yes)    Hodgkin's lymphoma (HCC) (POA: Unknown)  Resolved Problems:    Pulmonary emboli (HCC) (POA: Unknown)    Hypokalemia (POA: Yes)      FOLLOW UP  No future appointments.  Lacy Jernigan D.O.  [id number] [facility] [id number] [phone]        Freida Damon MD  Oncology    MEDICATIONS ON DISCHARGE     Medication List        START taking these medications        Instructions   ARIPiprazole 5 MG tablet  Commonly known as: Abilify   Take 1 Tablet by mouth every day.  Dose: 5 mg     busPIRone 15 MG tablet  Commonly known as: Buspar   Take 1 Tablet by mouth 2 times a day.  Dose: 15 mg            CHANGE how you take these medications        Instructions   albuterol 108 (90 Base) MCG/ACT Aers inhalation aerosol  What changed: additional  instructions   Inhale 2 Puffs every 6 hours as needed for Shortness of Breath.  Dose: 2 Puff              Allergies  No Known Allergies    DIET  No orders of the defined types were placed in this encounter.      ACTIVITY  As tolerated.  Weight bearing as tolerated    CONSULTATIONS  Oncology: Janet Damon MD  Psychiatry: Shirley Orellana MD  General surgeon Dr. Kat Grimm    PROCEDURES  1/9/2024 right upper arm PICC line  1/5/2024 left axillary excisional lymph node biopsy    LABORATORY  Lab Results   Component Value Date    SODIUM 136 01/11/2024    POTASSIUM 3.6 01/11/2024    CHLORIDE 101 01/11/2024    CO2 22 01/11/2024    GLUCOSE 85 01/11/2024    BUN 15 01/11/2024    CREATININE 0.66 01/11/2024    CREATININE 0.9 11/16/2007        Lab Results   Component Value Date    WBC 7.9 01/11/2024    HEMOGLOBIN 12.7 01/11/2024    HEMATOCRIT 38.2 01/11/2024    PLATELETCT 470 (H) 01/11/2024      CT-PETCT-WHOLE BODY   Final Result      1.  There are multifocal areas of abnormal uptake involving lower cervical lymphadenopathy, mediastinal and hilar adenopathy, left axillary adenopathy, bilateral anterior cardiophrenic adenopathy, and bilateral retrocrural lymphadenopathy. Findings are    consistent with Hodgkin's lymphoma. Deauville score 5   2.  Normal size spleen with no abnormal uptake in the spleen.   3.  Small dependent right greater than left pleural effusion with no abnormal uptake.   4.  Patchy lower lobe groundglass opacities probably due to atelectasis with pneumonitis not excluded.      IR-PICC LINE PLACEMENT W/ GUIDANCE > AGE 5   Final Result                  Ultrasound-guided PICC placement performed by qualified nursing staff as    above.          DX-CHEST-PORTABLE (1 VIEW)   Final Result         1.  Hazy bilateral lower lobe infiltrates.   2.  Superior mediastinal fullness, compatible with mediastinal masses, visible on CT January 5, 2024      DX-ABDOMEN FOR TUBE PLACEMENT   Final Result      Nasogastric  tube terminates in the gastric fundus.      DX-CHEST-PORTABLE (1 VIEW)   Final Result         1. Low ETT, 2 cm above the agustin.   2. No acute process.      CT-HEAD W/O   Final Result         NO ACUTE ABNORMALITIES ARE NOTED ON CT SCAN OF THE HEAD.               DX-CHEST-PORTABLE (1 VIEW)   Final Result      No acute process.      CT-CTA CHEST PULMONARY ARTERY W/ RECONS   Final Result         1.  No definite evidence of pulmonary embolism.   2.  Mild mixed change in presumed multifocal pneumonia.   3.  Extensive lymphadenopathy likely lymphoproliferative disorder such as lymphoma..      Fleischner Society pulmonary nodule recommendations:         EC-ECHOCARDIOGRAM COMPLETE W/O CONT   Final Result      US-EXTREMITY VENOUS LOWER BILAT   Final Result      DX-CHEST-PORTABLE (1 VIEW)   Final Result         1. No significant interval change.      DX-CHEST-PORTABLE (1 VIEW)   Final Result      1.  Interstitial prominence, new from prior exam.   2.  Multifocal mediastinal prominence as evaluated on CT chest.   3.  Support apparatus as above.      1/5/2024 Echocardiogram:  CONCLUSIONS  Normal left ventricular systolic function.  The left ventricular ejection fraction is visually estimated to be 60%.  Normal right ventricular size and systolic function.  No significant valvular abnormalities.   No prior study is available for comparison.       Total time of the discharge process exceeds 32 minutes.

## 2024-01-13 NOTE — PROGRESS NOTES
Patient left AMA from the hospital.  Despite counseling her that she needs treatment inpatient due to being very symptomatic and short of breath she decided to leave.    Before we can start treatment outpatient she will need    1) Port  2) chemo education  3) chemo appointment after Port   4) to see me in clinic  5)  tox check me with me 1 week later       Macie Damon MD  Hematology/Oncology

## 2024-01-16 ENCOUNTER — APPOINTMENT (OUTPATIENT)
Dept: ADMISSIONS | Facility: MEDICAL CENTER | Age: 34
End: 2024-01-16
Attending: STUDENT IN AN ORGANIZED HEALTH CARE EDUCATION/TRAINING PROGRAM
Payer: MEDICAID

## 2024-01-16 PROBLEM — A41.9 SEPTIC SHOCK (HCC): Status: ACTIVE | Noted: 2024-01-16

## 2024-01-16 PROBLEM — R65.21 SEPTIC SHOCK (HCC): Status: RESOLVED | Noted: 2024-01-16 | Resolved: 2024-01-16

## 2024-01-16 PROBLEM — A41.9 SEPSIS (HCC): Status: ACTIVE | Noted: 2024-01-16

## 2024-01-16 PROBLEM — A41.9 SEPSIS (HCC): Status: RESOLVED | Noted: 2024-01-16 | Resolved: 2024-01-16

## 2024-01-16 PROBLEM — R65.21 SEPTIC SHOCK (HCC): Status: ACTIVE | Noted: 2024-01-16

## 2024-01-16 PROBLEM — A41.9 SEPTIC SHOCK (HCC): Status: RESOLVED | Noted: 2024-01-16 | Resolved: 2024-01-16

## 2024-01-25 DIAGNOSIS — C81.98 HODGKIN LYMPHOMA OF LYMPH NODES OF MULTIPLE REGIONS, UNSPECIFIED HODGKIN LYMPHOMA TYPE (HCC): ICD-10-CM

## 2024-01-26 ENCOUNTER — HOSPITAL ENCOUNTER (OUTPATIENT)
Dept: HEMATOLOGY ONCOLOGY | Facility: MEDICAL CENTER | Age: 34
End: 2024-01-26
Attending: STUDENT IN AN ORGANIZED HEALTH CARE EDUCATION/TRAINING PROGRAM
Payer: MEDICAID

## 2024-01-26 ENCOUNTER — PATIENT OUTREACH (OUTPATIENT)
Dept: ONCOLOGY | Facility: MEDICAL CENTER | Age: 34
End: 2024-01-26

## 2024-01-26 ENCOUNTER — APPOINTMENT (OUTPATIENT)
Dept: RADIOLOGY | Facility: MEDICAL CENTER | Age: 34
End: 2024-01-26
Attending: STUDENT IN AN ORGANIZED HEALTH CARE EDUCATION/TRAINING PROGRAM
Payer: MEDICAID

## 2024-01-26 ENCOUNTER — APPOINTMENT (OUTPATIENT)
Dept: HEMATOLOGY ONCOLOGY | Facility: MEDICAL CENTER | Age: 34
End: 2024-01-26
Payer: MEDICAID

## 2024-01-26 VITALS
DIASTOLIC BLOOD PRESSURE: 52 MMHG | TEMPERATURE: 99.7 F | SYSTOLIC BLOOD PRESSURE: 110 MMHG | BODY MASS INDEX: 23.94 KG/M2 | WEIGHT: 140.21 LBS | HEART RATE: 113 BPM | OXYGEN SATURATION: 99 % | HEIGHT: 64 IN

## 2024-01-26 DIAGNOSIS — J45.901 ASTHMA WITH ACUTE EXACERBATION, UNSPECIFIED ASTHMA SEVERITY, UNSPECIFIED WHETHER PERSISTENT: ICD-10-CM

## 2024-01-26 DIAGNOSIS — C81.98 HODGKIN LYMPHOMA OF LYMPH NODES OF MULTIPLE REGIONS, UNSPECIFIED HODGKIN LYMPHOMA TYPE (HCC): ICD-10-CM

## 2024-01-26 DIAGNOSIS — Z79.899 ENCOUNTER FOR LONG-TERM (CURRENT) USE OF HIGH-RISK MEDICATION: ICD-10-CM

## 2024-01-26 DIAGNOSIS — D64.9 NORMOCHROMIC NORMOCYTIC ANEMIA: ICD-10-CM

## 2024-01-26 PROCEDURE — 99212 OFFICE O/P EST SF 10 MIN: CPT | Performed by: STUDENT IN AN ORGANIZED HEALTH CARE EDUCATION/TRAINING PROGRAM

## 2024-01-26 PROCEDURE — 99214 OFFICE O/P EST MOD 30 MIN: CPT | Performed by: STUDENT IN AN ORGANIZED HEALTH CARE EDUCATION/TRAINING PROGRAM

## 2024-01-26 RX ORDER — ALBUTEROL SULFATE 90 UG/1
2 AEROSOL, METERED RESPIRATORY (INHALATION) EVERY 6 HOURS PRN
Qty: 8.5 G | Refills: 0 | Status: SHIPPED | OUTPATIENT
Start: 2024-01-26

## 2024-01-26 RX ORDER — PROCHLORPERAZINE MALEATE 10 MG
10 TABLET ORAL EVERY 6 HOURS PRN
Qty: 30 TABLET | Refills: 3 | Status: SHIPPED | OUTPATIENT
Start: 2024-01-26

## 2024-01-26 RX ORDER — DIAZEPAM 5 MG/1
5 TABLET ORAL EVERY 8 HOURS PRN
Qty: 90 TABLET | Refills: 0 | Status: SHIPPED | OUTPATIENT
Start: 2024-01-26 | End: 2024-02-25

## 2024-01-26 RX ORDER — ONDANSETRON 4 MG/1
4 TABLET, ORALLY DISINTEGRATING ORAL EVERY 4 HOURS PRN
Qty: 30 TABLET | Refills: 3 | Status: SHIPPED | OUTPATIENT
Start: 2024-01-26

## 2024-01-26 ASSESSMENT — ENCOUNTER SYMPTOMS
WEIGHT LOSS: 0
HEARTBURN: 0
FEVER: 0
CHILLS: 0
HEADACHES: 0
ABDOMINAL PAIN: 0
NERVOUS/ANXIOUS: 1
MEMORY LOSS: 0
VOMITING: 0
DEPRESSION: 0
COUGH: 1
TINGLING: 0
BRUISES/BLEEDS EASILY: 0
FOCAL WEAKNESS: 0
ORTHOPNEA: 0
TREMORS: 0
SPUTUM PRODUCTION: 0
WHEEZING: 1
SORE THROAT: 0
NAUSEA: 0
SENSORY CHANGE: 0
BLURRED VISION: 0
NECK PAIN: 0
SHORTNESS OF BREATH: 1
PALPITATIONS: 0
DIZZINESS: 0

## 2024-01-26 ASSESSMENT — FIBROSIS 4 INDEX: FIB4 SCORE: 0.42

## 2024-01-26 NOTE — PROGRESS NOTES
Consult Note: Hematology/Oncology       Primary Care:  Lacy Jernigan D.O.    Chief Complaint   Patient presents with    New Patient     Hodgkins lymphoma          Subjective:   History of Presenting Illness:  Sol Jones is a 33 y.o. female who presents with a new diagnosis of  HL.     Patient presented to the emergency room on January 4, 2024 with acute dyspnea.  Patient reported that she had multiple ER visits for respiratory illnesses and exacerbations.  In the emergency room she was having wheezes tachypnea tripoding.  She was treated with steroids bronchodilators and low-dose ketamine.  Patient developed hypoxemia and worsening respiratory distress and required intubation.  She underwent a CT of her chest and soft tissue of her neck which was concerning for paratracheal mass, lymphadenopathy in her cervical mediastinal and axillary region.    She was transferred to the ICU.      She underwent a biopsy and preliminary results are consistent with HL.     She underwent a PET scan in the hospital. Results of the PET scan which demonstrates that she has cervical, mediastinal, hilar, left axillary, cardiophrenic, retrocrural LAD.  She has bulky mediastinal disease, given that her mass was roughly 6 cm.  Her ESR was >50.  She has more than 3 sites of disease.      She has stage IIA unfavorable risk. At that time, I recommended in house initiation of ABVD.  Patient refused and left AMA.    Interval History    Patient is here prior to the initiation of ABVD on January 29.    She reports that she is mildly anxious and nervous about the process going forward.  Currently living with her mother as well as her sister and nephew.      Past Medical History:   Diagnosis Date    ASTHMA     Ovarian cyst     Psychiatric disorder     anxiety        Past Surgical History:   Procedure Laterality Date    NODE BIOPSY Left 1/5/2024    Procedure: BIOPSY, LYMPH NODE;  Surgeon: Philip Hernandez M.D.;  [facility];  Service: General    GYN SURGERY      OTHER      L salpingectomy       Social History     Tobacco Use    Smoking status: Some Days     Current packs/day: 0.50     Average packs/day: 0.5 packs/day for 5.0 years (2.5 ttl pk-yrs)     Types: Cigarettes    Smokeless tobacco: Never   Vaping Use    Vaping Use: Former   Substance Use Topics    Alcohol use: Not Currently     Comment: very rare    Drug use: Not Currently     Types: Inhaled     Comment: marijuana        Family History   Problem Relation Age of Onset    Hypertension Mother        No Known Allergies    Current Outpatient Medications   Medication Sig Dispense Refill    albuterol 108 (90 Base) MCG/ACT Aero Soln inhalation aerosol Inhale 2 Puffs every 6 hours as needed for Shortness of Breath. 8.5 g 0    ARIPiprazole (ABILIFY) 5 MG tablet Take 1 Tablet by mouth every day. (Patient not taking: Reported on 1/26/2024) 30 Tablet 3    busPIRone (BUSPAR) 15 MG tablet Take 1 Tablet by mouth 2 times a day. (Patient not taking: Reported on 1/26/2024) 90 Tablet 2     No current facility-administered medications for this encounter.       Review of Systems   Constitutional:  Negative for chills, fever, malaise/fatigue and weight loss.   HENT:  Negative for congestion, ear pain, nosebleeds and sore throat.    Eyes:  Negative for blurred vision.   Respiratory:  Positive for cough, shortness of breath and wheezing. Negative for sputum production.    Cardiovascular:  Positive for chest pain. Negative for palpitations, orthopnea and leg swelling.   Gastrointestinal:  Negative for abdominal pain, heartburn, nausea and vomiting.   Genitourinary:  Negative for dysuria, frequency and urgency.   Musculoskeletal:  Negative for neck pain.   Neurological:  Negative for dizziness, tingling, tremors, sensory change, focal weakness and headaches.   Endo/Heme/Allergies:  Does not bruise/bleed easily.   Psychiatric/Behavioral:  Negative for depression, memory loss and suicidal  "ideas. The patient is nervous/anxious.         ++ Anxiety   All other systems reviewed and are negative.      Problem list, medications, and allergies reviewed by myself today in Epic.     Objective:     Vitals:    01/26/24 1121   BP: 110/52   BP Location: Right arm   Patient Position: Sitting   BP Cuff Size: Adult   Pulse: (!) 113   Temp: 37.6 °C (99.7 °F)   TempSrc: Temporal   SpO2: 99%   Weight: 63.6 kg (140 lb 3.4 oz)   Height: 1.626 m (5' 4.02\")       DESC; KARNOFSKY SCALE WITH ECOG EQUIVALENT: 80, Normal activity with effort; some signs or symptoms of disease (ECOG equivalent 1)    DISTRESS LEVEL: mild distress    Physical Exam  Constitutional:       General: She is not in acute distress.     Appearance: Normal appearance. She is not ill-appearing.   HENT:      Head: Normocephalic and atraumatic.      Nose: Nose normal.      Mouth/Throat:      Mouth: Mucous membranes are moist.      Pharynx: No oropharyngeal exudate or posterior oropharyngeal erythema.   Eyes:      General: No scleral icterus.     Conjunctiva/sclera: Conjunctivae normal.      Pupils: Pupils are equal, round, and reactive to light.   Cardiovascular:      Rate and Rhythm: Normal rate and regular rhythm.      Pulses: Normal pulses.      Heart sounds: Normal heart sounds. No murmur heard.     No friction rub. No gallop.   Pulmonary:      Effort: Pulmonary effort is normal. No respiratory distress.      Breath sounds: Normal breath sounds. No stridor. No wheezing, rhonchi or rales.   Chest:      Chest wall: No tenderness.   Abdominal:      General: Abdomen is flat. Bowel sounds are normal. There is no distension.      Palpations: Abdomen is soft. There is no mass.      Tenderness: There is no abdominal tenderness. There is no guarding.   Musculoskeletal:         General: No swelling, tenderness or deformity. Normal range of motion.      Cervical back: Normal range of motion and neck supple. No rigidity or tenderness.      Right lower leg: No edema. "      Left lower leg: No edema.   Skin:     General: Skin is warm and dry.      Coloration: Skin is not jaundiced or pale.      Findings: No bruising, erythema or rash.   Neurological:      General: No focal deficit present.      Mental Status: She is alert and oriented to person, place, and time. Mental status is at baseline.      Sensory: No sensory deficit.      Motor: No weakness.      Coordination: Coordination normal.      Gait: Gait normal.   Psychiatric:         Mood and Affect: Mood normal.         Behavior: Behavior normal.         Thought Content: Thought content normal.         Judgment: Judgment normal.         Labs:   Most recent labs reviewed.  Please see the lab tab of chart review    Imaging:   Most recent images below have been independently reviewed by me.      PET scan  1.  There are multifocal areas of abnormal uptake involving lower cervical lymphadenopathy, mediastinal and hilar adenopathy, left axillary adenopathy, bilateral anterior cardiophrenic adenopathy, and bilateral retrocrural lymphadenopathy. Findings are   consistent with Hodgkin's lymphoma. Deauville score 5  2.  Normal size spleen with no abnormal uptake in the spleen.  3.  Small dependent right greater than left pleural effusion with no abnormal uptake.  4.  Patchy lower lobe groundglass opacities probably due to atelectasis with pneumonitis not excluded.    Pathology:  A. Left axillary lymph node:          Classic Hodgkin lymphoma, early nodular sclerosis subtype.   Assessment/Plan:     Ms. Jones is a 32 yo F who presents with pathology consistent with classical Hodgkin's Lymphoma, Nodular Sclerosing subtype.     We had planned on initiating ABVD in the hospital the patient would rather do this outpatient.     Discussions held prior regarding treatment.      Plan  -chemo Education  -PICC on 1/27  -ABVD on 1/29  -will see us 1 week after        ABVD Regimen  28 day cycles for 2 cycles  Doxorubicin 25mg/m2 IV pushs on days 1 and  15  Bleomycin 10units/m2 IV over 10 minutes on days 1 through 15  Vinblastine 6ng/m2 IV over 5-10 minutes on days 1 and 15  Dacarbazine 375mg/m2 IV over 30 minutes on days 1 and 15       Treatment course precedes additional therapy, based on her subsequent Deauville score after restaging scans     Will discuss treatment with patient tomorrow after above tests are done     Macie Damon MD  Hematology/Oncology

## 2024-01-26 NOTE — NON-PROVIDER
The following appointments, labs, and medications have been provided to the patient:  Line: PICC then Port  ECHO: TBD  WBC Support (g-csf): TBD  Labs: CBC, CMP, LDH, Uric Acid  Follow up appts: 2/5/24  Chemo/immunotherapy class: 1/26/24  Chemotherapy Regimen Hodgkin's Lymphoma: Doxorubicin + Bleomycin + Vinblastine +Dacarbazine  Nausea medication: zofran/compazine prescribed sent to home pharmacy  Other treatment specific meds: Valium  Oral chemo follow up: N/A  Pain medication: Per provider discretion  Nurse norma: Referred on 1/26/24 Established with TBD  Dietician:  BECKY  SW: BECKY  FRA: Referred on TBD    Additional info/teaching for immunotherapy patients:  If hospitalized, inform staff of immunotherapy treatment and have them contact oncologist - immunotherapy alert card provided.    Additional info/teaching for chemotherapy patients:  Neutropenia reminder card provided to patient      Additional teaching:  Patient was provided with drug specific handouts and [facility] side effects sheet. Patient verbalized that questions and concerns regarding treatment have been addressed. Consent to proceed with treatment was reviewed and signed during this visit.    Spent 60 minutes of continuous, non-interrupted, face-to-face patient contact in which greater than 50% of the visit was spent counseling and coordinating of care.

## 2024-01-27 NOTE — PROGRESS NOTES
LATE ENTRY.    1.26.24 @ 1733.  ONCOLOGY NURSE NAVIGATION (ONN):  MARK Lombardi reached out to patient to follow up on provider referral.  No answer.  NN left voice message with my contact information requesting a call back at patient's earliest convenience.    INTERVENTION:  Patient enrolled in [facility].  Will meet pt at [facility] 1.30.24 and provide Navigation letter and information packet.

## 2024-01-27 NOTE — ADDENDUM NOTE
Encounter addended by: Juno Roca on: 1/26/2024 4:31 PM   Actions taken: Charge Capture section accepted

## 2024-01-29 ENCOUNTER — PATIENT OUTREACH (OUTPATIENT)
Dept: ONCOLOGY | Facility: MEDICAL CENTER | Age: 34
End: 2024-01-29
Payer: MEDICAID

## 2024-01-29 ENCOUNTER — TELEPHONE (OUTPATIENT)
Dept: ONCOLOGY | Facility: MEDICAL CENTER | Age: 34
End: 2024-01-29
Payer: MEDICAID

## 2024-01-29 NOTE — PROGRESS NOTES
ONCOLOGY NURSE NAVIGATION (ONN):  Patient did not arrive for her 0900 appointment in [facility] today.  MARK Lombardi reached out to patient at her cell and home numbers. No answer.  MARK left voice messages with my contact information requesting a call back at patient's earliest convenience.  MARK sent a Teams message to SHANTAL Harper [facility] requesting information at her earliest convenience.      INTERVENTION:  Patient previously enrolled in [facility].  ONN introduction letter sent to patient via [facility].    1.30.2024 @ 1056.    MARK Lombardi spoke with SHANTAL Harper via phone.  We discussed the patient and plan thru Dr. Damon's office is to wait to call the patient till 2/1/2024.  [facility] or [facility] will make that call.

## 2024-01-29 NOTE — TELEPHONE ENCOUNTER
Message left to Sol regarding her 0900 appointment today. MD's office notified that Sol has not shown up for her 0900 appointment today.

## 2024-01-30 LAB
FUNGUS SPEC CULT: NORMAL
FUNGUS SPEC FUNGUS STN: NORMAL
SIGNIFICANT IND 70042: NORMAL
SITE SITE: NORMAL
SOURCE SOURCE: NORMAL

## 2024-02-12 ENCOUNTER — APPOINTMENT (OUTPATIENT)
Dept: ONCOLOGY | Facility: MEDICAL CENTER | Age: 34
End: 2024-02-12
Payer: MEDICAID

## 2024-02-19 LAB
MYCOBACTERIUM SPEC CULT: NORMAL
RHODAMINE-AURAMINE STN SPEC: NORMAL
SIGNIFICANT IND 70042: NORMAL
SITE SITE: NORMAL
SOURCE SOURCE: NORMAL

## 2024-02-23 ENCOUNTER — TELEPHONE (OUTPATIENT)
Dept: HEMATOLOGY ONCOLOGY | Facility: MEDICAL CENTER | Age: 34
End: 2024-02-23
Payer: MEDICAID

## 2024-02-24 NOTE — TELEPHONE ENCOUNTER
Called patient's phone number listed on file. Unable to reach patient and LVM to please call office back regarding missed appointments.

## 2024-02-26 ENCOUNTER — APPOINTMENT (OUTPATIENT)
Dept: ONCOLOGY | Facility: MEDICAL CENTER | Age: 34
End: 2024-02-26
Payer: MEDICAID

## 2024-03-11 ENCOUNTER — APPOINTMENT (OUTPATIENT)
Dept: ONCOLOGY | Facility: MEDICAL CENTER | Age: 34
End: 2024-03-11
Payer: MEDICAID

## (undated) DEVICE — LACTATED RINGERS INJ 1000 ML - (14EA/CA 60CA/PF)

## (undated) DEVICE — SUCTION INSTRUMENT YANKAUER BULBOUS TIP W/O VENT (50EA/CA)

## (undated) DEVICE — SENSOR OXIMETER ADULT SPO2 RD SET (20EA/BX)

## (undated) DEVICE — GLOVE BIOGEL PI INDICATOR SZ 7.5 SURGICAL PF LF -(50/BX 4BX/CA)

## (undated) DEVICE — GOWN SURGEONS LARGE - (32/CA)

## (undated) DEVICE — GOWN WARMING STANDARD FLEX - (30/CA)

## (undated) DEVICE — SUTURE 4-0 MONOCRYL PLUS PS-2 - 27 INCH (36/BX)

## (undated) DEVICE — TUBING CLEARLINK DUO-VENT - C-FLO (48EA/CA)

## (undated) DEVICE — GLOVE SZ 6.5 BIOGEL PI MICRO - PF LF (50PR/BX)

## (undated) DEVICE — SUTURE 3-0 VICRYL PLUS SH - 8X 18 INCH (12/BX)

## (undated) DEVICE — CANISTER SUCTION 3000ML MECHANICAL FILTER AUTO SHUTOFF MEDI-VAC NONSTERILE LF DISP  (40EA/CA)

## (undated) DEVICE — GLOVE BIOGEL PI INDICATOR SZ 6.0 SURGICAL PF LF -(200PR/CA)

## (undated) DEVICE — GLOVE BIOGEL PI INDICATOR SZ 6.5 SURGICAL PF LF - (50/BX 4BX/CA)

## (undated) DEVICE — SUTURE 2-0 COATED VICRYL PLUS - 12 X 18 INCH (12/BX)

## (undated) DEVICE — BLANKET WARMING LOWER BODY (10EA/CA)

## (undated) DEVICE — TOWELS CLOTH SURGICAL - (4/PK 20PK/CA)

## (undated) DEVICE — CHLORAPREP 26 ML APPLICATOR - ORANGE TINT(25/CA)

## (undated) DEVICE — SLEEVE VASO CALF MED - (10PR/CA)

## (undated) DEVICE — SET EXTENSION WITH 2 PORTS (48EA/CA) ***PART #2C8610 IS A SUBSTITUTE*****

## (undated) DEVICE — PACK MINOR BASIN - (2EA/CA)

## (undated) DEVICE — SUTURE GENERAL

## (undated) DEVICE — BLADE SURGICAL #15 - (50/BX 3BX/CA)

## (undated) DEVICE — SET LEADWIRE 5 LEAD BEDSIDE DISPOSABLE ECG (1SET OF 5/EA)

## (undated) DEVICE — GLOVE SZ 6 BIOGEL PI MICRO - PF LF (50PR/BX 4BX/CA)

## (undated) DEVICE — SPONGE GAUZESTER 4 X 4 4PLY - (128PK/CA)

## (undated) DEVICE — COVER LIGHT HANDLE ALC PLUS DISP (18EA/BX)

## (undated) DEVICE — ELECTRODE DUAL RETURN W/ CORD - (50/PK)